# Patient Record
Sex: FEMALE | Race: WHITE | Employment: OTHER | URBAN - METROPOLITAN AREA
[De-identification: names, ages, dates, MRNs, and addresses within clinical notes are randomized per-mention and may not be internally consistent; named-entity substitution may affect disease eponyms.]

---

## 2018-04-18 ENCOUNTER — HOSPITAL ENCOUNTER (OUTPATIENT)
Age: 83
Discharge: HOME | End: 2018-04-20
Payer: MEDICARE

## 2018-04-18 LAB
ALBUMIN SERPL-MCNC: 4.1 G/DL (ref 3.5–5.2)
ALP BLD-CCNC: 55 U/L (ref 35–104)
ALT SERPL-CCNC: 13 U/L (ref 0–32)
ANION GAP SERPL CALCULATED.3IONS-SCNC: 21 MMOL/L (ref 7–16)
AST SERPL-CCNC: 22 U/L (ref 0–31)
BASOPHILS ABSOLUTE: 0.06 E9/L (ref 0–0.2)
BASOPHILS RELATIVE PERCENT: 1.3 % (ref 0–2)
BILIRUB SERPL-MCNC: 0.3 MG/DL (ref 0–1.2)
BUN BLDV-MCNC: 36 MG/DL (ref 8–23)
CALCIUM SERPL-MCNC: 9.5 MG/DL (ref 8.6–10.2)
CHLORIDE BLD-SCNC: 101 MMOL/L (ref 98–107)
CHOLESTEROL, TOTAL: 152 MG/DL (ref 0–199)
CO2: 22 MMOL/L (ref 22–29)
CREAT SERPL-MCNC: 1.2 MG/DL (ref 0.5–1)
EOSINOPHILS ABSOLUTE: 0.06 E9/L (ref 0.05–0.5)
EOSINOPHILS RELATIVE PERCENT: 1.3 % (ref 0–6)
GFR AFRICAN AMERICAN: 51
GFR NON-AFRICAN AMERICAN: 42 ML/MIN/1.73
GLUCOSE BLD-MCNC: 97 MG/DL (ref 74–109)
HCT VFR BLD CALC: 37 % (ref 34–48)
HDLC SERPL-MCNC: 75 MG/DL
HEMOGLOBIN: 11.8 G/DL (ref 11.5–15.5)
IMMATURE GRANULOCYTES #: 0.02 E9/L
IMMATURE GRANULOCYTES %: 0.4 % (ref 0–5)
LDL CHOLESTEROL CALCULATED: 41 MG/DL (ref 0–99)
LYMPHOCYTES ABSOLUTE: 0.94 E9/L (ref 1.5–4)
LYMPHOCYTES RELATIVE PERCENT: 19.7 % (ref 20–42)
MCH RBC QN AUTO: 30.8 PG (ref 26–35)
MCHC RBC AUTO-ENTMCNC: 31.9 % (ref 32–34.5)
MCV RBC AUTO: 96.6 FL (ref 80–99.9)
MONOCYTES ABSOLUTE: 0.48 E9/L (ref 0.1–0.95)
MONOCYTES RELATIVE PERCENT: 10.1 % (ref 2–12)
NEUTROPHILS ABSOLUTE: 3.2 E9/L (ref 1.8–7.3)
NEUTROPHILS RELATIVE PERCENT: 67.2 % (ref 43–80)
PDW BLD-RTO: 13.6 FL (ref 11.5–15)
PLATELET # BLD: 154 E9/L (ref 130–450)
PMV BLD AUTO: 12.1 FL (ref 7–12)
POTASSIUM SERPL-SCNC: 4.6 MMOL/L (ref 3.5–5)
RBC # BLD: 3.83 E12/L (ref 3.5–5.5)
SODIUM BLD-SCNC: 144 MMOL/L (ref 132–146)
TOTAL PROTEIN: 7.1 G/DL (ref 6.4–8.3)
TRIGL SERPL-MCNC: 181 MG/DL (ref 0–149)
TSH SERPL DL<=0.05 MIU/L-ACNC: 3.34 UIU/ML (ref 0.27–4.2)
VITAMIN D 25-HYDROXY: 40 NG/ML (ref 30–100)
VLDLC SERPL CALC-MCNC: 36 MG/DL
WBC # BLD: 4.8 E9/L (ref 4.5–11.5)

## 2018-04-18 PROCEDURE — 82306 VITAMIN D 25 HYDROXY: CPT

## 2018-04-18 PROCEDURE — 84443 ASSAY THYROID STIM HORMONE: CPT

## 2018-04-18 PROCEDURE — 85025 COMPLETE CBC W/AUTO DIFF WBC: CPT

## 2018-04-18 PROCEDURE — 80053 COMPREHEN METABOLIC PANEL: CPT

## 2018-04-18 PROCEDURE — 80061 LIPID PANEL: CPT

## 2018-04-24 ENCOUNTER — HOSPITAL ENCOUNTER (OUTPATIENT)
Dept: ULTRASOUND IMAGING | Age: 83
End: 2018-04-24
Payer: MEDICARE

## 2018-04-24 ENCOUNTER — HOSPITAL ENCOUNTER (OUTPATIENT)
Dept: MAMMOGRAPHY | Age: 83
Discharge: HOME | End: 2018-04-26
Payer: MEDICARE

## 2018-04-24 DIAGNOSIS — Z12.39 BREAST SCREENING: ICD-10-CM

## 2018-04-24 PROCEDURE — G0279 TOMOSYNTHESIS, MAMMO: HCPCS

## 2018-05-08 ENCOUNTER — HOSPITAL ENCOUNTER (OUTPATIENT)
Dept: INFUSION THERAPY | Age: 83
Discharge: HOME | End: 2018-05-08
Payer: MEDICARE

## 2018-05-08 ENCOUNTER — OFFICE VISIT (OUTPATIENT)
Dept: ONCOLOGY | Age: 83
End: 2018-05-08
Payer: MEDICARE

## 2018-05-08 VITALS
HEART RATE: 73 BPM | BODY MASS INDEX: 27.83 KG/M2 | DIASTOLIC BLOOD PRESSURE: 85 MMHG | HEIGHT: 64 IN | WEIGHT: 163 LBS | TEMPERATURE: 98.6 F | SYSTOLIC BLOOD PRESSURE: 165 MMHG | RESPIRATION RATE: 20 BRPM

## 2018-05-08 DIAGNOSIS — C50.512 MALIGNANT NEOPLASM OF LOWER-OUTER QUADRANT OF LEFT FEMALE BREAST, UNSPECIFIED ESTROGEN RECEPTOR STATUS (HCC): ICD-10-CM

## 2018-05-08 LAB
ALBUMIN SERPL-MCNC: 3.9 G/DL (ref 3.5–5.2)
ALP BLD-CCNC: 58 U/L (ref 35–104)
ALT SERPL-CCNC: 13 U/L (ref 0–32)
ANION GAP SERPL CALCULATED.3IONS-SCNC: 14 MMOL/L (ref 7–16)
AST SERPL-CCNC: 21 U/L (ref 0–31)
BASOPHILS ABSOLUTE: 0.07 E9/L (ref 0–0.2)
BASOPHILS RELATIVE PERCENT: 1.5 % (ref 0–2)
BILIRUB SERPL-MCNC: 0.3 MG/DL (ref 0–1.2)
BUN BLDV-MCNC: 35 MG/DL (ref 8–23)
CALCIUM SERPL-MCNC: 9.2 MG/DL (ref 8.6–10.2)
CHLORIDE BLD-SCNC: 102 MMOL/L (ref 98–107)
CO2: 24 MMOL/L (ref 22–29)
CREAT SERPL-MCNC: 1.2 MG/DL (ref 0.5–1)
EOSINOPHILS ABSOLUTE: 0.06 E9/L (ref 0.05–0.5)
EOSINOPHILS RELATIVE PERCENT: 1.3 % (ref 0–6)
GFR AFRICAN AMERICAN: 51
GFR NON-AFRICAN AMERICAN: 42 ML/MIN/1.73
GLUCOSE BLD-MCNC: 133 MG/DL (ref 74–109)
HCT VFR BLD CALC: 35.7 % (ref 34–48)
HEMOGLOBIN: 11.7 G/DL (ref 11.5–15.5)
IMMATURE GRANULOCYTES #: 0.03 E9/L
IMMATURE GRANULOCYTES %: 0.6 % (ref 0–5)
LYMPHOCYTES ABSOLUTE: 1 E9/L (ref 1.5–4)
LYMPHOCYTES RELATIVE PERCENT: 21 % (ref 20–42)
MCH RBC QN AUTO: 31.6 PG (ref 26–35)
MCHC RBC AUTO-ENTMCNC: 32.8 % (ref 32–34.5)
MCV RBC AUTO: 96.5 FL (ref 80–99.9)
MONOCYTES ABSOLUTE: 0.49 E9/L (ref 0.1–0.95)
MONOCYTES RELATIVE PERCENT: 10.3 % (ref 2–12)
NEUTROPHILS ABSOLUTE: 3.12 E9/L (ref 1.8–7.3)
NEUTROPHILS RELATIVE PERCENT: 65.3 % (ref 43–80)
PDW BLD-RTO: 13.4 FL (ref 11.5–15)
PLATELET # BLD: 150 E9/L (ref 130–450)
PMV BLD AUTO: 11.1 FL (ref 7–12)
POTASSIUM SERPL-SCNC: 4.4 MMOL/L (ref 3.5–5)
RBC # BLD: 3.7 E12/L (ref 3.5–5.5)
SODIUM BLD-SCNC: 140 MMOL/L (ref 132–146)
TOTAL PROTEIN: 6.6 G/DL (ref 6.4–8.3)
WBC # BLD: 4.8 E9/L (ref 4.5–11.5)

## 2018-05-08 PROCEDURE — 80053 COMPREHEN METABOLIC PANEL: CPT

## 2018-05-08 PROCEDURE — 99214 OFFICE O/P EST MOD 30 MIN: CPT | Performed by: INTERNAL MEDICINE

## 2018-05-08 PROCEDURE — 85025 COMPLETE CBC W/AUTO DIFF WBC: CPT

## 2018-05-08 PROCEDURE — 36415 COLL VENOUS BLD VENIPUNCTURE: CPT | Performed by: INTERNAL MEDICINE

## 2018-05-08 PROCEDURE — 1090F PRES/ABSN URINE INCON ASSESS: CPT | Performed by: INTERNAL MEDICINE

## 2018-05-08 PROCEDURE — 1036F TOBACCO NON-USER: CPT | Performed by: INTERNAL MEDICINE

## 2018-05-08 PROCEDURE — 1123F ACP DISCUSS/DSCN MKR DOCD: CPT | Performed by: INTERNAL MEDICINE

## 2018-05-08 PROCEDURE — 99212 OFFICE O/P EST SF 10 MIN: CPT

## 2018-05-08 PROCEDURE — 4040F PNEUMOC VAC/ADMIN/RCVD: CPT | Performed by: INTERNAL MEDICINE

## 2018-05-08 PROCEDURE — G8417 CALC BMI ABV UP PARAM F/U: HCPCS | Performed by: INTERNAL MEDICINE

## 2018-05-08 PROCEDURE — G8427 DOCREV CUR MEDS BY ELIG CLIN: HCPCS | Performed by: INTERNAL MEDICINE

## 2018-08-21 ENCOUNTER — HOSPITAL ENCOUNTER (OUTPATIENT)
Age: 83
Discharge: HOME | End: 2018-08-23
Payer: MEDICARE

## 2018-08-21 LAB
ALBUMIN SERPL-MCNC: 4.2 G/DL (ref 3.5–5.2)
ALP BLD-CCNC: 53 U/L (ref 35–104)
ALT SERPL-CCNC: 12 U/L (ref 0–32)
ANION GAP SERPL CALCULATED.3IONS-SCNC: 21 MMOL/L (ref 7–16)
AST SERPL-CCNC: 22 U/L (ref 0–31)
BASOPHILS ABSOLUTE: 0.05 E9/L (ref 0–0.2)
BASOPHILS RELATIVE PERCENT: 0.9 % (ref 0–2)
BILIRUB SERPL-MCNC: 0.2 MG/DL (ref 0–1.2)
BUN BLDV-MCNC: 33 MG/DL (ref 8–23)
CALCIUM SERPL-MCNC: 9.4 MG/DL (ref 8.6–10.2)
CHLORIDE BLD-SCNC: 101 MMOL/L (ref 98–107)
CHOLESTEROL, TOTAL: 167 MG/DL (ref 0–199)
CO2: 20 MMOL/L (ref 22–29)
CREAT SERPL-MCNC: 1.4 MG/DL (ref 0.5–1)
EOSINOPHILS ABSOLUTE: 0.11 E9/L (ref 0.05–0.5)
EOSINOPHILS RELATIVE PERCENT: 2 % (ref 0–6)
GFR AFRICAN AMERICAN: 43
GFR NON-AFRICAN AMERICAN: 35 ML/MIN/1.73
GLUCOSE BLD-MCNC: 75 MG/DL (ref 74–109)
HCT VFR BLD CALC: 35.7 % (ref 34–48)
HDLC SERPL-MCNC: 69 MG/DL
HEMOGLOBIN: 11.5 G/DL (ref 11.5–15.5)
IMMATURE GRANULOCYTES #: 0.02 E9/L
IMMATURE GRANULOCYTES %: 0.4 % (ref 0–5)
LDL CHOLESTEROL CALCULATED: 53 MG/DL (ref 0–99)
LYMPHOCYTES ABSOLUTE: 1.16 E9/L (ref 1.5–4)
LYMPHOCYTES RELATIVE PERCENT: 20.6 % (ref 20–42)
MCH RBC QN AUTO: 31.2 PG (ref 26–35)
MCHC RBC AUTO-ENTMCNC: 32.2 % (ref 32–34.5)
MCV RBC AUTO: 96.7 FL (ref 80–99.9)
MONOCYTES ABSOLUTE: 0.56 E9/L (ref 0.1–0.95)
MONOCYTES RELATIVE PERCENT: 10 % (ref 2–12)
NEUTROPHILS ABSOLUTE: 3.72 E9/L (ref 1.8–7.3)
NEUTROPHILS RELATIVE PERCENT: 66.1 % (ref 43–80)
PDW BLD-RTO: 13.5 FL (ref 11.5–15)
PLATELET # BLD: 156 E9/L (ref 130–450)
PMV BLD AUTO: 11.8 FL (ref 7–12)
POTASSIUM SERPL-SCNC: 4.6 MMOL/L (ref 3.5–5)
RBC # BLD: 3.69 E12/L (ref 3.5–5.5)
SODIUM BLD-SCNC: 142 MMOL/L (ref 132–146)
TOTAL PROTEIN: 6.8 G/DL (ref 6.4–8.3)
TRIGL SERPL-MCNC: 225 MG/DL (ref 0–149)
TSH SERPL DL<=0.05 MIU/L-ACNC: 3.06 UIU/ML (ref 0.27–4.2)
VLDLC SERPL CALC-MCNC: 45 MG/DL
WBC # BLD: 5.6 E9/L (ref 4.5–11.5)

## 2018-08-21 PROCEDURE — 80061 LIPID PANEL: CPT

## 2018-08-21 PROCEDURE — 80053 COMPREHEN METABOLIC PANEL: CPT

## 2018-08-21 PROCEDURE — 84443 ASSAY THYROID STIM HORMONE: CPT

## 2018-08-21 PROCEDURE — 85025 COMPLETE CBC W/AUTO DIFF WBC: CPT

## 2018-11-06 ENCOUNTER — OFFICE VISIT (OUTPATIENT)
Dept: ONCOLOGY | Age: 83
End: 2018-11-06
Payer: MEDICARE

## 2018-11-06 ENCOUNTER — HOSPITAL ENCOUNTER (OUTPATIENT)
Dept: INFUSION THERAPY | Age: 83
Discharge: HOME | End: 2018-11-06
Payer: MEDICARE

## 2018-11-06 VITALS
WEIGHT: 162.4 LBS | BODY MASS INDEX: 27.72 KG/M2 | SYSTOLIC BLOOD PRESSURE: 196 MMHG | HEART RATE: 76 BPM | DIASTOLIC BLOOD PRESSURE: 93 MMHG | TEMPERATURE: 98.4 F | RESPIRATION RATE: 20 BRPM | HEIGHT: 64 IN

## 2018-11-06 DIAGNOSIS — C50.512 MALIGNANT NEOPLASM OF LOWER-OUTER QUADRANT OF LEFT BREAST OF FEMALE, ESTROGEN RECEPTOR POSITIVE (HCC): Primary | ICD-10-CM

## 2018-11-06 DIAGNOSIS — Z17.0 MALIGNANT NEOPLASM OF LOWER-OUTER QUADRANT OF LEFT BREAST OF FEMALE, ESTROGEN RECEPTOR POSITIVE (HCC): Primary | ICD-10-CM

## 2018-11-06 LAB
ALBUMIN SERPL-MCNC: 4.1 G/DL (ref 3.5–5.2)
ALP BLD-CCNC: 57 U/L (ref 35–104)
ALT SERPL-CCNC: 14 U/L (ref 0–32)
ANION GAP SERPL CALCULATED.3IONS-SCNC: 12 MMOL/L (ref 7–16)
AST SERPL-CCNC: 24 U/L (ref 0–31)
BASOPHILS ABSOLUTE: 0.06 E9/L (ref 0–0.2)
BASOPHILS RELATIVE PERCENT: 1 % (ref 0–2)
BILIRUB SERPL-MCNC: <0.2 MG/DL (ref 0–1.2)
BUN BLDV-MCNC: 30 MG/DL (ref 8–23)
CALCIUM SERPL-MCNC: 9.3 MG/DL (ref 8.6–10.2)
CHLORIDE BLD-SCNC: 102 MMOL/L (ref 98–107)
CO2: 26 MMOL/L (ref 22–29)
CREAT SERPL-MCNC: 1.3 MG/DL (ref 0.5–1)
EOSINOPHILS ABSOLUTE: 0.07 E9/L (ref 0.05–0.5)
EOSINOPHILS RELATIVE PERCENT: 1.2 % (ref 0–6)
GFR AFRICAN AMERICAN: 47
GFR NON-AFRICAN AMERICAN: 38 ML/MIN/1.73
GLUCOSE BLD-MCNC: 107 MG/DL (ref 74–99)
HCT VFR BLD CALC: 36.2 % (ref 34–48)
HEMOGLOBIN: 11.6 G/DL (ref 11.5–15.5)
IMMATURE GRANULOCYTES #: 0.02 E9/L
IMMATURE GRANULOCYTES %: 0.3 % (ref 0–5)
LYMPHOCYTES ABSOLUTE: 1.01 E9/L (ref 1.5–4)
LYMPHOCYTES RELATIVE PERCENT: 17.1 % (ref 20–42)
MCH RBC QN AUTO: 31.4 PG (ref 26–35)
MCHC RBC AUTO-ENTMCNC: 32 % (ref 32–34.5)
MCV RBC AUTO: 97.8 FL (ref 80–99.9)
MONOCYTES ABSOLUTE: 0.56 E9/L (ref 0.1–0.95)
MONOCYTES RELATIVE PERCENT: 9.5 % (ref 2–12)
NEUTROPHILS ABSOLUTE: 4.19 E9/L (ref 1.8–7.3)
NEUTROPHILS RELATIVE PERCENT: 70.9 % (ref 43–80)
PDW BLD-RTO: 13.2 FL (ref 11.5–15)
PLATELET # BLD: 165 E9/L (ref 130–450)
PMV BLD AUTO: 11.1 FL (ref 7–12)
POTASSIUM SERPL-SCNC: 4.7 MMOL/L (ref 3.5–5)
RBC # BLD: 3.7 E12/L (ref 3.5–5.5)
SODIUM BLD-SCNC: 140 MMOL/L (ref 132–146)
TOTAL PROTEIN: 6.9 G/DL (ref 6.4–8.3)
WBC # BLD: 5.9 E9/L (ref 4.5–11.5)

## 2018-11-06 PROCEDURE — 1090F PRES/ABSN URINE INCON ASSESS: CPT | Performed by: INTERNAL MEDICINE

## 2018-11-06 PROCEDURE — 99214 OFFICE O/P EST MOD 30 MIN: CPT | Performed by: INTERNAL MEDICINE

## 2018-11-06 PROCEDURE — 99213 OFFICE O/P EST LOW 20 MIN: CPT

## 2018-11-06 PROCEDURE — 1101F PT FALLS ASSESS-DOCD LE1/YR: CPT | Performed by: INTERNAL MEDICINE

## 2018-11-06 PROCEDURE — 1123F ACP DISCUSS/DSCN MKR DOCD: CPT | Performed by: INTERNAL MEDICINE

## 2018-11-06 PROCEDURE — G8427 DOCREV CUR MEDS BY ELIG CLIN: HCPCS | Performed by: INTERNAL MEDICINE

## 2018-11-06 PROCEDURE — 80053 COMPREHEN METABOLIC PANEL: CPT

## 2018-11-06 PROCEDURE — 85025 COMPLETE CBC W/AUTO DIFF WBC: CPT

## 2018-11-06 PROCEDURE — 36415 COLL VENOUS BLD VENIPUNCTURE: CPT

## 2018-11-06 PROCEDURE — 4040F PNEUMOC VAC/ADMIN/RCVD: CPT | Performed by: INTERNAL MEDICINE

## 2018-11-06 PROCEDURE — 99214 OFFICE O/P EST MOD 30 MIN: CPT

## 2018-11-06 PROCEDURE — G8417 CALC BMI ABV UP PARAM F/U: HCPCS | Performed by: INTERNAL MEDICINE

## 2018-11-06 PROCEDURE — 1036F TOBACCO NON-USER: CPT | Performed by: INTERNAL MEDICINE

## 2018-11-06 PROCEDURE — G8484 FLU IMMUNIZE NO ADMIN: HCPCS | Performed by: INTERNAL MEDICINE

## 2018-11-06 NOTE — PROGRESS NOTES
osteoporosis. She underwent a left needle localized lumpectomy on Jessi 3, 2015. Pathological evaluation demonstrated:  Breast, left at 6 o'clock, excision: Ductal carcinoma, invasive, moderately differentiated (Grade 2). Margins of resection are free of tumor. Tumor lies 5 mm from the closest (anterior) margin of resection. Tumor size: Invasive carcinoma measures 6 mm in greatest dimension  Pathologic staging (pTNM): pT1b, pNX  Stage I. Osteoporosis precluded AI therapy; Patient was a candidate for Tamoxifen. Tamoxifen 20 mg po daily was started on 06/23/2015. Review of Systems;  CONSTITUTIONAL: No fever, chills. Denies hot flashes. Fair appetite and energy level. ENMT: Eyes: No diplopia; Nose: No epistaxis. Mouth: No sore throat. RESPIRATORY: No hemoptysis, shortness of breath, cough. CARDIOVASCULAR: No chest pain, palpitations. GASTROINTESTINAL: No nausea/vomiting, abdominal pain, diarrhea/constipation. GENITOURINARY: No dysuria, urinary frequency, hematuria. MSK: Mild arthralgias, stable. Uses walker for ambulation. NEURO: No syncope, presyncope, headache. Remainder ROS: Negative    Past Medical History:      Diagnosis Date    Cancer ([facility] [facility]     melonoma - back    CHF (congestive heart failure) (HCC)     Chronic back pain     Hyperlipidemia     Hypertension     Macular degeneration     Osteoarthritis     Restless legs syndrome      Medications:  Reviewed and reconciled. Allergies: Allergies   Allergen Reactions    Flagyl [Metronidazole] Hives     Physical Exam:  BP (!) 196/93 (Site: Right Upper Arm, Position: Sitting, Cuff Size: Medium Adult) Comment: pumped twice  Pulse 76   Temp 98.4 °F (36.9 °C) (Temporal)   Resp 20   Ht 5' 4\" (1.626 m)   Wt 162 lb 6.4 oz (73.7 kg)   BMI 27.88 kg/m²   GENERAL: Alert, oriented x 3, not in acute distress. HEENT: PERRLA; EOMI. Oropharynx clear. NECK: Supple. Without lymphadenopathy. LUNGS: Good air entry bilaterally.  No wheezing,

## 2019-04-01 ENCOUNTER — TELEPHONE (OUTPATIENT)
Dept: ONCOLOGY | Age: 84
End: 2019-04-01

## 2019-04-01 NOTE — TELEPHONE ENCOUNTER
Spoke with Clark Santos this morning to give her appt info for scheduled [facility] at [address] [place] [address] on Monday 4/29/19 @ 10:30. Arrive 10am.  No lotions, creams, powders around breast area or under arms. Go to entrance B. Clark Santos confirmed info.

## 2019-04-16 ENCOUNTER — HOSPITAL ENCOUNTER (OUTPATIENT)
Age: 84
Discharge: HOME | End: 2019-04-18
Payer: MEDICARE

## 2019-04-16 LAB
ALBUMIN SERPL-MCNC: 4.2 G/DL (ref 3.5–5.2)
ALP BLD-CCNC: 61 U/L (ref 35–104)
ALT SERPL-CCNC: 13 U/L (ref 0–32)
ANION GAP SERPL CALCULATED.3IONS-SCNC: 19 MMOL/L (ref 7–16)
AST SERPL-CCNC: 23 U/L (ref 0–31)
BASOPHILS ABSOLUTE: 0.05 E9/L (ref 0–0.2)
BASOPHILS RELATIVE PERCENT: 0.9 % (ref 0–2)
BILIRUB SERPL-MCNC: 0.2 MG/DL (ref 0–1.2)
BUN BLDV-MCNC: 33 MG/DL (ref 8–23)
CALCIUM SERPL-MCNC: 9.4 MG/DL (ref 8.6–10.2)
CHLORIDE BLD-SCNC: 106 MMOL/L (ref 98–107)
CHOLESTEROL, TOTAL: 150 MG/DL (ref 0–199)
CO2: 19 MMOL/L (ref 22–29)
CREAT SERPL-MCNC: 1.4 MG/DL (ref 0.5–1)
EOSINOPHILS ABSOLUTE: 0.06 E9/L (ref 0.05–0.5)
EOSINOPHILS RELATIVE PERCENT: 1.1 % (ref 0–6)
GFR AFRICAN AMERICAN: 43
GFR NON-AFRICAN AMERICAN: 35 ML/MIN/1.73
GLUCOSE BLD-MCNC: 92 MG/DL (ref 74–99)
HCT VFR BLD CALC: 35.7 % (ref 34–48)
HDLC SERPL-MCNC: 79 MG/DL
HEMOGLOBIN: 11.5 G/DL (ref 11.5–15.5)
IMMATURE GRANULOCYTES #: 0.02 E9/L
IMMATURE GRANULOCYTES %: 0.4 % (ref 0–5)
LDL CHOLESTEROL CALCULATED: 37 MG/DL (ref 0–99)
LYMPHOCYTES ABSOLUTE: 0.85 E9/L (ref 1.5–4)
LYMPHOCYTES RELATIVE PERCENT: 15.4 % (ref 20–42)
MCH RBC QN AUTO: 31.3 PG (ref 26–35)
MCHC RBC AUTO-ENTMCNC: 32.2 % (ref 32–34.5)
MCV RBC AUTO: 97.3 FL (ref 80–99.9)
MONOCYTES ABSOLUTE: 0.63 E9/L (ref 0.1–0.95)
MONOCYTES RELATIVE PERCENT: 11.4 % (ref 2–12)
NEUTROPHILS ABSOLUTE: 3.91 E9/L (ref 1.8–7.3)
NEUTROPHILS RELATIVE PERCENT: 70.8 % (ref 43–80)
PDW BLD-RTO: 14.2 FL (ref 11.5–15)
PLATELET # BLD: 158 E9/L (ref 130–450)
PMV BLD AUTO: 11.7 FL (ref 7–12)
POTASSIUM SERPL-SCNC: 4.6 MMOL/L (ref 3.5–5)
RBC # BLD: 3.67 E12/L (ref 3.5–5.5)
SODIUM BLD-SCNC: 144 MMOL/L (ref 132–146)
TOTAL PROTEIN: 6.9 G/DL (ref 6.4–8.3)
TRIGL SERPL-MCNC: 168 MG/DL (ref 0–149)
TSH SERPL DL<=0.05 MIU/L-ACNC: 3.13 UIU/ML (ref 0.27–4.2)
VLDLC SERPL CALC-MCNC: 34 MG/DL
WBC # BLD: 5.5 E9/L (ref 4.5–11.5)

## 2019-04-16 PROCEDURE — 80061 LIPID PANEL: CPT

## 2019-04-16 PROCEDURE — 80053 COMPREHEN METABOLIC PANEL: CPT

## 2019-04-16 PROCEDURE — 85025 COMPLETE CBC W/AUTO DIFF WBC: CPT

## 2019-04-16 PROCEDURE — 84443 ASSAY THYROID STIM HORMONE: CPT

## 2019-05-22 ENCOUNTER — HOSPITAL ENCOUNTER (OUTPATIENT)
Dept: ULTRASOUND IMAGING | Age: 84
Discharge: HOME | End: 2019-05-22
Payer: MEDICARE

## 2019-05-22 ENCOUNTER — HOSPITAL ENCOUNTER (OUTPATIENT)
Dept: MAMMOGRAPHY | Age: 84
Discharge: HOME | End: 2019-05-24
Payer: MEDICARE

## 2019-05-22 DIAGNOSIS — N64.89 BREAST ASYMMETRY: ICD-10-CM

## 2019-05-22 DIAGNOSIS — Z17.0 MALIGNANT NEOPLASM OF LOWER-OUTER QUADRANT OF LEFT BREAST OF FEMALE, ESTROGEN RECEPTOR POSITIVE (HCC): ICD-10-CM

## 2019-05-22 DIAGNOSIS — C50.512 MALIGNANT NEOPLASM OF LOWER-OUTER QUADRANT OF LEFT BREAST OF FEMALE, ESTROGEN RECEPTOR POSITIVE (HCC): ICD-10-CM

## 2019-05-22 PROCEDURE — 76642 ULTRASOUND BREAST LIMITED: CPT

## 2019-05-22 PROCEDURE — 77066 DX MAMMO INCL CAD BI: CPT

## 2019-05-28 ENCOUNTER — OFFICE VISIT (OUTPATIENT)
Dept: ONCOLOGY | Age: 84
End: 2019-05-28
Payer: MEDICARE

## 2019-05-28 ENCOUNTER — HOSPITAL ENCOUNTER (OUTPATIENT)
Dept: INFUSION THERAPY | Age: 84
Discharge: HOME | End: 2019-05-28
Payer: MEDICARE

## 2019-05-28 VITALS
SYSTOLIC BLOOD PRESSURE: 191 MMHG | BODY MASS INDEX: 26.77 KG/M2 | DIASTOLIC BLOOD PRESSURE: 84 MMHG | RESPIRATION RATE: 20 BRPM | TEMPERATURE: 97.9 F | WEIGHT: 156.8 LBS | HEIGHT: 64 IN | HEART RATE: 84 BPM

## 2019-05-28 DIAGNOSIS — Z85.3 PERSONAL HISTORY OF BREAST CANCER: ICD-10-CM

## 2019-05-28 DIAGNOSIS — Z85.3 PERSONAL HISTORY OF BREAST CANCER: Primary | ICD-10-CM

## 2019-05-28 DIAGNOSIS — Z17.0 MALIGNANT NEOPLASM OF LOWER-OUTER QUADRANT OF LEFT BREAST OF FEMALE, ESTROGEN RECEPTOR POSITIVE (HCC): Primary | ICD-10-CM

## 2019-05-28 DIAGNOSIS — C50.512 MALIGNANT NEOPLASM OF LOWER-OUTER QUADRANT OF LEFT BREAST OF FEMALE, ESTROGEN RECEPTOR POSITIVE (HCC): Primary | ICD-10-CM

## 2019-05-28 LAB
ALBUMIN SERPL-MCNC: 3.8 G/DL (ref 3.5–5.2)
ALP BLD-CCNC: 60 U/L (ref 35–104)
ALT SERPL-CCNC: 12 U/L (ref 0–32)
ANION GAP SERPL CALCULATED.3IONS-SCNC: 10 MMOL/L (ref 7–16)
AST SERPL-CCNC: 20 U/L (ref 0–31)
BASOPHILS ABSOLUTE: 0.06 E9/L (ref 0–0.2)
BASOPHILS RELATIVE PERCENT: 1.3 % (ref 0–2)
BILIRUB SERPL-MCNC: 0.3 MG/DL (ref 0–1.2)
BUN BLDV-MCNC: 34 MG/DL (ref 8–23)
CALCIUM SERPL-MCNC: 9.3 MG/DL (ref 8.6–10.2)
CHLORIDE BLD-SCNC: 101 MMOL/L (ref 98–107)
CO2: 26 MMOL/L (ref 22–29)
CREAT SERPL-MCNC: 1.3 MG/DL (ref 0.5–1)
EOSINOPHILS ABSOLUTE: 0.06 E9/L (ref 0.05–0.5)
EOSINOPHILS RELATIVE PERCENT: 1.3 % (ref 0–6)
GFR AFRICAN AMERICAN: 46
GFR NON-AFRICAN AMERICAN: 38 ML/MIN/1.73
GLUCOSE BLD-MCNC: 121 MG/DL (ref 74–99)
HCT VFR BLD CALC: 34.9 % (ref 34–48)
HEMOGLOBIN: 11.3 G/DL (ref 11.5–15.5)
IMMATURE GRANULOCYTES #: 0.02 E9/L
IMMATURE GRANULOCYTES %: 0.4 % (ref 0–5)
LYMPHOCYTES ABSOLUTE: 0.76 E9/L (ref 1.5–4)
LYMPHOCYTES RELATIVE PERCENT: 16.3 % (ref 20–42)
MCH RBC QN AUTO: 31.8 PG (ref 26–35)
MCHC RBC AUTO-ENTMCNC: 32.4 % (ref 32–34.5)
MCV RBC AUTO: 98.3 FL (ref 80–99.9)
MONOCYTES ABSOLUTE: 0.53 E9/L (ref 0.1–0.95)
MONOCYTES RELATIVE PERCENT: 11.4 % (ref 2–12)
NEUTROPHILS ABSOLUTE: 3.23 E9/L (ref 1.8–7.3)
NEUTROPHILS RELATIVE PERCENT: 69.3 % (ref 43–80)
PDW BLD-RTO: 13.5 FL (ref 11.5–15)
PLATELET # BLD: 148 E9/L (ref 130–450)
PMV BLD AUTO: 10.6 FL (ref 7–12)
POTASSIUM SERPL-SCNC: 4.4 MMOL/L (ref 3.5–5)
RBC # BLD: 3.55 E12/L (ref 3.5–5.5)
SODIUM BLD-SCNC: 137 MMOL/L (ref 132–146)
TOTAL PROTEIN: 6.4 G/DL (ref 6.4–8.3)
WBC # BLD: 4.7 E9/L (ref 4.5–11.5)

## 2019-05-28 PROCEDURE — G8417 CALC BMI ABV UP PARAM F/U: HCPCS | Performed by: INTERNAL MEDICINE

## 2019-05-28 PROCEDURE — 99213 OFFICE O/P EST LOW 20 MIN: CPT

## 2019-05-28 PROCEDURE — 85025 COMPLETE CBC W/AUTO DIFF WBC: CPT

## 2019-05-28 PROCEDURE — 1090F PRES/ABSN URINE INCON ASSESS: CPT | Performed by: INTERNAL MEDICINE

## 2019-05-28 PROCEDURE — 1123F ACP DISCUSS/DSCN MKR DOCD: CPT | Performed by: INTERNAL MEDICINE

## 2019-05-28 PROCEDURE — 1036F TOBACCO NON-USER: CPT | Performed by: INTERNAL MEDICINE

## 2019-05-28 PROCEDURE — 36415 COLL VENOUS BLD VENIPUNCTURE: CPT

## 2019-05-28 PROCEDURE — G8427 DOCREV CUR MEDS BY ELIG CLIN: HCPCS | Performed by: INTERNAL MEDICINE

## 2019-05-28 PROCEDURE — 4040F PNEUMOC VAC/ADMIN/RCVD: CPT | Performed by: INTERNAL MEDICINE

## 2019-05-28 PROCEDURE — 99214 OFFICE O/P EST MOD 30 MIN: CPT | Performed by: INTERNAL MEDICINE

## 2019-05-28 PROCEDURE — 80053 COMPREHEN METABOLIC PANEL: CPT

## 2019-05-28 NOTE — PROGRESS NOTES
Department of [place] [id number]   Attending Clinic Note    Reason for Visit: Follow-up on a patient with Left Breast Cancer. PCP:  Carlos Liu MD    History of Present Illness: The breast lump was noted on the left diagnostic mammogram in March 2015 at [facility]. The mass was located in the 7 o'clock position of the left breast.     Left digital diagnostic mammogram was performed at [facility] on Febrauary 26, 2015     Left breast ultrasound performed at [facility] on Feb. 26th, 2015    The patient's imaging was performed at [facility] on March 19, 2015:  MAMMOGRAM FINDINGS:    There is an isodense area of architectural distortion measuring 9 mm with spiculated margins seen in the posterior region of the left breast lower inner quadrant. ULTRASOUND FINDINGS:    Ultrasound is suggestive of a lymph node or fat containing lesion measuring 4 mm at the site of the mammographic abnormality. This is thought to be discordant with the imaging findings. IMPRESSION:    Focus of architectural distortion in the left breast is suspicious. Molecular breast imaging and a stereotactic biopsy is recommended. The mammographic finding is new since the 2013 comparison exam. The US findings are thought to be discordant with the mammographic findings. The patient has been scheduled for MBI and stereotactic biopsy. Please note that the US biopsy was cancelled as a result of the discordant imaging findings. Since the lesion was better seen on the mammogram, a stereotactic biopsy has been scheduled. BI-RADS Category 4: Suspicious Abnormality      Stereotactic left breast core biopsy at 7 o'clock position on April 2 , 2015. Pathological evaluation at [facility] demonstrated Infiltrating ductal carcinoma, moderately differentiated. Estrogen Receptor (>95%), Progesterone Receptor (20%), Her2/sarabjit Negative (1+). CXR on 04/27/2015 noted no metastatic disease.  DEXA scan on 04/27/2015 noted osteoporosis. She underwent a left needle localized lumpectomy on Jessi 3, 2015. Pathological evaluation demonstrated:  Breast, left at 6 o'clock, excision: Ductal carcinoma, invasive, moderately differentiated (Grade 2). Margins of resection are free of tumor. Tumor lies 5 mm from the closest (anterior) margin of resection. Tumor size: Invasive carcinoma measures 6 mm in greatest dimension  Pathologic staging (pTNM): pT1b, pNX  Stage I. Osteoporosis precluded AI therapy; Patient was a candidate for Tamoxifen. Tamoxifen 20 mg po daily was started on 06/23/2015. Review of Systems;  CONSTITUTIONAL: No fever, chills. Denies hot flashes. Fair appetite and energy level. ENMT: Eyes: No diplopia; Nose: No epistaxis. Mouth: No sore throat. RESPIRATORY: No hemoptysis, shortness of breath, cough. CARDIOVASCULAR: No chest pain, palpitations. GASTROINTESTINAL: No nausea/vomiting, abdominal pain, diarrhea/constipation. GENITOURINARY: No dysuria, urinary frequency, hematuria. MSK: Mild arthralgias, stable. Uses walker for ambulation. NEURO: No syncope, presyncope, headache. Remainder ROS: Negative    Past Medical History:      Diagnosis Date    Cancer ([facility] [address]     melonoma - back    CHF (congestive heart failure) (HCC)     Chronic back pain     Hyperlipidemia     Hypertension     Macular degeneration     Osteoarthritis     Restless legs syndrome      Medications:  Reviewed and reconciled. Allergies: Allergies   Allergen Reactions    Flagyl [Metronidazole] Hives     Physical Exam:  BP (!) 191/84 (Site: Right Upper Arm, Position: Sitting, Cuff Size: Medium Adult) Comment: Machine pumped twice. Pulse 84   Temp 97.9 °F (36.6 °C) (Oral)   Resp 20   Ht 5' 4\" (1.626 m)   Wt 156 lb 12.8 oz (71.1 kg)   BMI 26.91 kg/m²   GENERAL: Alert, oriented x 3, not in acute distress. HEENT: PERRLA; EOMI. Oropharynx clear. NECK: Supple. Without lymphadenopathy. LUNGS: Good air entry bilaterally.  No wheezing, crackles or rhonchi. BREASTS:No palpable masses or adenopathy. CARDIOVASCULAR: Regular rate. No murmurs, rubs or gallops. ABDOMEN: Soft. Non-tender, non-distended. Positive bowel sounds. EXTREMITIES: Without clubbing or cyanosis or edema   NEUROLOGIC: No focal deficits. ECOG PS 1    Impression/Plan:  80y.o. year old woman who underwent a left needle localized lumpectomy on Jessi 3, 2015. Pathological evaluation demonstrated:  Breast, left at 6 o'clock, excision: Ductal carcinoma, invasive, moderately differentiated (Grade 2). Margins of resection are free of tumor. Tumor lies 5 mm from the closest (anterior) margin of resection. Tumor size: Invasive carcinoma measures 6 mm in greatest dimension  Pathologic staging (pTNM): pT1b, pNX  Stage I. Osteoporosis on DEXA scan April 2015 precluded AI therapy; Patient was a candidate for Tamoxifen. Tamoxifen 20 mg po daily was started on 06/23/2015. On low-dose aspirin 81 mg po daily. No routine GYN exams due to total hysterectomy. Bilateral Diagnostic Mammogram/Left Breast U/S on 05/22/2019: There is a new small asymmetry medial to the scar in the central left breast on the cc view. This is not well seen on the MLO view. Ultrasound performed near the surgical scar documenting no evidence of a mass. Presents today, 05/28/2019 for follow up and continues to tolerate Tamoxifen well. No clinical evidence of recurrence. Continue Tamoxifen.  RTC 6 months with prior left diagnostic mammogram.    Antonette Acharya MD   8/31/2540  Board Certified Medical Oncologist   [facility] MEDICAL ONCOLOGY   [facility] [place]

## 2019-12-05 ENCOUNTER — HOSPITAL ENCOUNTER (OUTPATIENT)
Dept: MAMMOGRAPHY | Age: 84
Discharge: HOME | End: 2019-12-07
Payer: MEDICARE

## 2019-12-05 ENCOUNTER — HOSPITAL ENCOUNTER (OUTPATIENT)
Dept: ULTRASOUND IMAGING | Age: 84
Discharge: HOME | End: 2019-12-05
Payer: MEDICARE

## 2019-12-05 DIAGNOSIS — C50.512 MALIGNANT NEOPLASM OF LOWER-OUTER QUADRANT OF LEFT BREAST OF FEMALE, ESTROGEN RECEPTOR POSITIVE (HCC): ICD-10-CM

## 2019-12-05 DIAGNOSIS — Z17.0 MALIGNANT NEOPLASM OF LOWER-OUTER QUADRANT OF LEFT BREAST OF FEMALE, ESTROGEN RECEPTOR POSITIVE (HCC): ICD-10-CM

## 2019-12-05 PROCEDURE — 77065 DX MAMMO INCL CAD UNI: CPT

## 2019-12-06 ENCOUNTER — OFFICE VISIT (OUTPATIENT)
Dept: ONCOLOGY | Age: 84
End: 2019-12-06
Payer: MEDICARE

## 2019-12-06 ENCOUNTER — HOSPITAL ENCOUNTER (OUTPATIENT)
Dept: INFUSION THERAPY | Age: 84
Discharge: HOME | End: 2019-12-06
Payer: MEDICARE

## 2019-12-06 VITALS
OXYGEN SATURATION: 100 % | HEIGHT: 64 IN | BODY MASS INDEX: 26.8 KG/M2 | WEIGHT: 157 LBS | HEART RATE: 74 BPM | TEMPERATURE: 96.9 F | DIASTOLIC BLOOD PRESSURE: 72 MMHG | SYSTOLIC BLOOD PRESSURE: 150 MMHG

## 2019-12-06 DIAGNOSIS — Z17.0 MALIGNANT NEOPLASM OF LOWER-OUTER QUADRANT OF LEFT BREAST OF FEMALE, ESTROGEN RECEPTOR POSITIVE (HCC): Primary | ICD-10-CM

## 2019-12-06 DIAGNOSIS — C50.512 MALIGNANT NEOPLASM OF LOWER-OUTER QUADRANT OF LEFT BREAST OF FEMALE, ESTROGEN RECEPTOR POSITIVE (HCC): Primary | ICD-10-CM

## 2019-12-06 PROCEDURE — 1036F TOBACCO NON-USER: CPT | Performed by: INTERNAL MEDICINE

## 2019-12-06 PROCEDURE — G8427 DOCREV CUR MEDS BY ELIG CLIN: HCPCS | Performed by: INTERNAL MEDICINE

## 2019-12-06 PROCEDURE — 99213 OFFICE O/P EST LOW 20 MIN: CPT

## 2019-12-06 PROCEDURE — 99214 OFFICE O/P EST MOD 30 MIN: CPT | Performed by: INTERNAL MEDICINE

## 2019-12-06 PROCEDURE — G8484 FLU IMMUNIZE NO ADMIN: HCPCS | Performed by: INTERNAL MEDICINE

## 2019-12-06 PROCEDURE — G8417 CALC BMI ABV UP PARAM F/U: HCPCS | Performed by: INTERNAL MEDICINE

## 2019-12-06 PROCEDURE — 1123F ACP DISCUSS/DSCN MKR DOCD: CPT | Performed by: INTERNAL MEDICINE

## 2019-12-06 PROCEDURE — 4040F PNEUMOC VAC/ADMIN/RCVD: CPT | Performed by: INTERNAL MEDICINE

## 2019-12-06 PROCEDURE — 1090F PRES/ABSN URINE INCON ASSESS: CPT | Performed by: INTERNAL MEDICINE

## 2019-12-31 ENCOUNTER — HOSPITAL ENCOUNTER (OUTPATIENT)
Age: 84
Discharge: HOME | End: 2020-01-02
Payer: MEDICARE

## 2019-12-31 LAB
ALBUMIN SERPL-MCNC: 4.1 G/DL (ref 3.5–5.2)
ALP BLD-CCNC: 59 U/L (ref 35–104)
ALT SERPL-CCNC: 14 U/L (ref 0–32)
ANION GAP SERPL CALCULATED.3IONS-SCNC: 16 MMOL/L (ref 7–16)
AST SERPL-CCNC: 22 U/L (ref 0–31)
BASOPHILS ABSOLUTE: 0.06 E9/L (ref 0–0.2)
BASOPHILS RELATIVE PERCENT: 1.1 % (ref 0–2)
BILIRUB SERPL-MCNC: 0.3 MG/DL (ref 0–1.2)
BUN BLDV-MCNC: 33 MG/DL (ref 8–23)
CALCIUM SERPL-MCNC: 9.9 MG/DL (ref 8.6–10.2)
CHLORIDE BLD-SCNC: 101 MMOL/L (ref 98–107)
CHOLESTEROL, TOTAL: 154 MG/DL (ref 0–199)
CO2: 22 MMOL/L (ref 22–29)
CREAT SERPL-MCNC: 1.4 MG/DL (ref 0.5–1)
EOSINOPHILS ABSOLUTE: 0.1 E9/L (ref 0.05–0.5)
EOSINOPHILS RELATIVE PERCENT: 1.9 % (ref 0–6)
GFR AFRICAN AMERICAN: 43
GFR NON-AFRICAN AMERICAN: 35 ML/MIN/1.73
GLUCOSE BLD-MCNC: 108 MG/DL (ref 74–99)
HCT VFR BLD CALC: 35.7 % (ref 34–48)
HDLC SERPL-MCNC: 95 MG/DL
HEMOGLOBIN: 11.1 G/DL (ref 11.5–15.5)
IMMATURE GRANULOCYTES #: 0.01 E9/L
IMMATURE GRANULOCYTES %: 0.2 % (ref 0–5)
LDL CHOLESTEROL CALCULATED: 33 MG/DL (ref 0–99)
LYMPHOCYTES ABSOLUTE: 0.73 E9/L (ref 1.5–4)
LYMPHOCYTES RELATIVE PERCENT: 13.9 % (ref 20–42)
MCH RBC QN AUTO: 30.7 PG (ref 26–35)
MCHC RBC AUTO-ENTMCNC: 31.1 % (ref 32–34.5)
MCV RBC AUTO: 98.9 FL (ref 80–99.9)
MONOCYTES ABSOLUTE: 0.62 E9/L (ref 0.1–0.95)
MONOCYTES RELATIVE PERCENT: 11.8 % (ref 2–12)
NEUTROPHILS ABSOLUTE: 3.72 E9/L (ref 1.8–7.3)
NEUTROPHILS RELATIVE PERCENT: 71.1 % (ref 43–80)
PDW BLD-RTO: 13.6 FL (ref 11.5–15)
PLATELET # BLD: 177 E9/L (ref 130–450)
PMV BLD AUTO: 11.8 FL (ref 7–12)
POTASSIUM SERPL-SCNC: 4.6 MMOL/L (ref 3.5–5)
RBC # BLD: 3.61 E12/L (ref 3.5–5.5)
SODIUM BLD-SCNC: 139 MMOL/L (ref 132–146)
TOTAL PROTEIN: 7.1 G/DL (ref 6.4–8.3)
TRIGL SERPL-MCNC: 130 MG/DL (ref 0–149)
TSH SERPL DL<=0.05 MIU/L-ACNC: 4.24 UIU/ML (ref 0.27–4.2)
VLDLC SERPL CALC-MCNC: 26 MG/DL
WBC # BLD: 5.2 E9/L (ref 4.5–11.5)

## 2019-12-31 PROCEDURE — 84443 ASSAY THYROID STIM HORMONE: CPT

## 2019-12-31 PROCEDURE — 85025 COMPLETE CBC W/AUTO DIFF WBC: CPT

## 2019-12-31 PROCEDURE — 80061 LIPID PANEL: CPT

## 2019-12-31 PROCEDURE — 80053 COMPREHEN METABOLIC PANEL: CPT

## 2020-05-20 ENCOUNTER — TELEPHONE (OUTPATIENT)
Dept: INFUSION THERAPY | Age: 85
End: 2020-05-20

## 2020-05-26 ENCOUNTER — HOSPITAL ENCOUNTER (OUTPATIENT)
Dept: MAMMOGRAPHY | Age: 85
Discharge: HOME | End: 2020-05-28
Payer: MEDICARE

## 2020-05-26 PROCEDURE — 77067 SCR MAMMO BI INCL CAD: CPT

## 2020-06-09 ENCOUNTER — VIRTUAL VISIT (OUTPATIENT)
Dept: ONCOLOGY | Age: 85
End: 2020-06-09
Payer: MEDICARE

## 2020-06-09 PROCEDURE — 4040F PNEUMOC VAC/ADMIN/RCVD: CPT | Performed by: INTERNAL MEDICINE

## 2020-06-09 PROCEDURE — G8417 CALC BMI ABV UP PARAM F/U: HCPCS | Performed by: INTERNAL MEDICINE

## 2020-06-09 PROCEDURE — 1123F ACP DISCUSS/DSCN MKR DOCD: CPT | Performed by: INTERNAL MEDICINE

## 2020-06-09 PROCEDURE — G8428 CUR MEDS NOT DOCUMENT: HCPCS | Performed by: INTERNAL MEDICINE

## 2020-06-09 PROCEDURE — 1090F PRES/ABSN URINE INCON ASSESS: CPT | Performed by: INTERNAL MEDICINE

## 2020-06-09 PROCEDURE — 1036F TOBACCO NON-USER: CPT | Performed by: INTERNAL MEDICINE

## 2020-06-09 PROCEDURE — 99214 OFFICE O/P EST MOD 30 MIN: CPT | Performed by: INTERNAL MEDICINE

## 2020-06-09 NOTE — PROGRESS NOTES
noted osteoporosis. She underwent a left needle localized lumpectomy on Jessi 3, 2015. Pathological evaluation demonstrated:  Breast, left at 6 o'clock, excision: Ductal carcinoma, invasive, moderately differentiated (Grade 2). Margins of resection are free of tumor. Tumor lies 5 mm from the closest (anterior) margin of resection. Tumor size: Invasive carcinoma measures 6 mm in greatest dimension  Pathologic staging (pTNM): pT1b, pNX  Stage I. Osteoporosis precluded AI therapy; Patient was a candidate for Tamoxifen. Tamoxifen 20 mg po daily was started on 06/23/2015. Telephone visit; time spent 21 mins. Provider in clinic. Patient home. Completed 5 years of Tamoxifen this June 2020. Bilateral screening mammogram on 05/26/2020 negative for malignancy. Review of Systems;  CONSTITUTIONAL: No fever, chills. Denies hot flashes. Fair appetite and energy level. ENMT: Eyes: No diplopia; Nose: No epistaxis. Mouth: No sore throat. RESPIRATORY: No hemoptysis, shortness of breath, cough. CARDIOVASCULAR: No chest pain, palpitations. GASTROINTESTINAL: No nausea/vomiting, abdominal pain, diarrhea/constipation. GENITOURINARY: No dysuria, urinary frequency, hematuria. MSK: Mild arthralgias, stable. Uses walker for ambulation. NEURO: No syncope, presyncope, headache. Remainder ROS: Negative    Past Medical History:      Diagnosis Date    Cancer ([address]     melonoma - back    CHF (congestive heart failure) (HCC)     Chronic back pain     Hyperlipidemia     Hypertension     Macular degeneration     Osteoarthritis     Restless legs syndrome      Medications:  Reviewed and reconciled. Allergies: Allergies   Allergen Reactions    Flagyl [Metronidazole] Hives     Impression/Plan:  80y.o. year old woman who underwent a left needle localized lumpectomy on Jessi 3, 2015.  Pathological evaluation demonstrated:  Breast, left at 6 o'clock, excision: Ductal carcinoma, invasive, moderately differentiated (Grade 2). Margins of resection are free of tumor. Tumor lies 5 mm from the closest (anterior) margin of resection. Tumor size: Invasive carcinoma measures 6 mm in greatest dimension  Pathologic staging (pTNM): pT1b, pNX  Stage I. Osteoporosis on DEXA scan April 2015 precluded AI therapy; Patient was a candidate for Tamoxifen. Tamoxifen 20 mg po daily was started on 06/23/2015. Bilateral Diagnostic Mammogram/Left Breast U/S on 05/22/2019: There is a new small asymmetry medial to the scar in the central left breast on the cc view. This is not well seen on the MLO view. Ultrasound performed near the surgical scar documenting no evidence of a mass. Left Diagnostic Mammogram 12/05/2019 Negative for malignancy. Bilateral screening mammogram 05/26/2020 negative for malignancy. Completed Tamoxifen this June 2020. Telephone visit 06/09/2020; time spent 21 mins. provider in clinic. Patient home. No clinical evidence of recurrence.      RTC in one year with prior mammogram.    Keturah Villanueva MD   4/9/5175  Board Certified Medical Oncologist   [facility] MEDICAL ONCOLOGY   [facility] [place]

## 2020-07-20 ENCOUNTER — HOSPITAL ENCOUNTER (OUTPATIENT)
Dept: INTERVENTIONAL RADIOLOGY/VASCULAR | Age: 85
Discharge: HOME | End: 2020-07-22
Payer: MEDICARE

## 2020-07-20 PROCEDURE — 93971 EXTREMITY STUDY: CPT

## 2020-07-28 ENCOUNTER — HOSPITAL ENCOUNTER (OUTPATIENT)
Age: 85
Discharge: HOME | End: 2020-07-30
Payer: MEDICARE

## 2020-08-06 ENCOUNTER — APPOINTMENT (OUTPATIENT)
Dept: GENERAL RADIOLOGY | Age: 85
End: 2020-08-06
Payer: MEDICARE

## 2020-08-06 ENCOUNTER — HOSPITAL ENCOUNTER (EMERGENCY)
Age: 85
Discharge: HOME | End: 2020-08-06
Attending: EMERGENCY MEDICINE
Payer: MEDICARE

## 2020-08-06 VITALS
DIASTOLIC BLOOD PRESSURE: 90 MMHG | HEART RATE: 88 BPM | SYSTOLIC BLOOD PRESSURE: 193 MMHG | RESPIRATION RATE: 18 BRPM | TEMPERATURE: 98.4 F | OXYGEN SATURATION: 98 %

## 2020-08-06 PROCEDURE — 6360000002 HC RX W HCPCS: Performed by: STUDENT IN AN ORGANIZED HEALTH CARE EDUCATION/TRAINING PROGRAM

## 2020-08-06 PROCEDURE — 99284 EMERGENCY DEPT VISIT MOD MDM: CPT

## 2020-08-06 PROCEDURE — 72100 X-RAY EXAM L-S SPINE 2/3 VWS: CPT

## 2020-08-06 PROCEDURE — 96372 THER/PROPH/DIAG INJ SC/IM: CPT

## 2020-08-06 PROCEDURE — 73502 X-RAY EXAM HIP UNI 2-3 VIEWS: CPT

## 2020-08-06 PROCEDURE — 99283 EMERGENCY DEPT VISIT LOW MDM: CPT

## 2020-08-06 RX ORDER — ACETAMINOPHEN AND CODEINE PHOSPHATE 300; 30 MG/1; MG/1
1 TABLET ORAL EVERY 4 HOURS PRN
Qty: 18 TABLET | Refills: 0 | Status: SHIPPED | OUTPATIENT
Start: 2020-08-06 | End: 2020-08-09

## 2020-08-06 RX ORDER — FENTANYL CITRATE 50 UG/ML
25 INJECTION, SOLUTION INTRAMUSCULAR; INTRAVENOUS ONCE
Status: COMPLETED | OUTPATIENT
Start: 2020-08-06 | End: 2020-08-06

## 2020-08-06 RX ORDER — KETOROLAC TROMETHAMINE 15 MG/ML
15 INJECTION, SOLUTION INTRAMUSCULAR; INTRAVENOUS ONCE
Status: COMPLETED | OUTPATIENT
Start: 2020-08-06 | End: 2020-08-06

## 2020-08-06 RX ADMIN — FENTANYL CITRATE 25 MCG: 50 INJECTION, SOLUTION INTRAMUSCULAR; INTRAVENOUS at 18:20

## 2020-08-06 RX ADMIN — KETOROLAC TROMETHAMINE 15 MG: 15 INJECTION, SOLUTION INTRAMUSCULAR; INTRAVENOUS at 18:19

## 2020-08-06 ASSESSMENT — ENCOUNTER SYMPTOMS
SHORTNESS OF BREATH: 0
EYE PAIN: 0
ABDOMINAL DISTENTION: 0
SORE THROAT: 0
SINUS PRESSURE: 0
BACK PAIN: 1
DIARRHEA: 0
COUGH: 0
VOMITING: 0
EYE DISCHARGE: 0
WHEEZING: 0
EYE REDNESS: 0
NAUSEA: 0

## 2020-08-06 ASSESSMENT — PAIN DESCRIPTION - PAIN TYPE
TYPE: CHRONIC PAIN
TYPE: ACUTE PAIN
TYPE: ACUTE PAIN

## 2020-08-06 ASSESSMENT — PAIN SCALES - GENERAL
PAINLEVEL_OUTOF10: 4
PAINLEVEL_OUTOF10: 10

## 2020-08-06 ASSESSMENT — PAIN DESCRIPTION - PROGRESSION: CLINICAL_PROGRESSION: GRADUALLY WORSENING

## 2020-08-06 ASSESSMENT — PAIN DESCRIPTION - ORIENTATION: ORIENTATION: LEFT

## 2020-08-06 ASSESSMENT — PAIN DESCRIPTION - LOCATION: LOCATION: BACK

## 2020-08-06 ASSESSMENT — PAIN DESCRIPTION - DESCRIPTORS: DESCRIPTORS: BURNING;CONSTANT

## 2020-08-06 ASSESSMENT — PAIN DESCRIPTION - FREQUENCY: FREQUENCY: CONTINUOUS

## 2020-08-06 ASSESSMENT — PAIN DESCRIPTION - ONSET: ONSET: GRADUAL

## 2020-08-06 ASSESSMENT — PAIN - FUNCTIONAL ASSESSMENT: PAIN_FUNCTIONAL_ASSESSMENT: PREVENTS OR INTERFERES SOME ACTIVE ACTIVITIES AND ADLS

## 2020-08-06 NOTE — ED NOTES
Patient returned from x-ray.       Tricia Love, [facility]  08/06/20 2162

## 2020-08-07 NOTE — ED PROVIDER NOTES
Patient presents with back pain. Patient states that she went to see her ortho and was given a medrol dose pack for her hip pain. After taking her second dose she felt a burning pain in her back. She called the pharmacist who advised her to stop taking her medication and the physicians office who advised her to come to the ED since the surgeon was not in office. Back Pain   Location:  Sacro-iliac joint  Quality:  Burning  Radiates to:  Does not radiate  Pain severity:  Severe  Pain is:  Same all the time  Onset quality:  Sudden  Duration:  1 day  Timing:  Constant  Progression:  Unchanged  Chronicity:  New  Context: not falling, not lifting heavy objects and not recent injury    Relieved by:  None tried  Worsened by:  Standing and touching  Associated symptoms: no chest pain, no dysuria, no fever, no headaches, no leg pain, no numbness and no weakness    Risk factors: hx of osteoporosis and menopause         Review of Systems   Constitutional: Negative for chills and fever. HENT: Negative for ear pain, sinus pressure and sore throat. Eyes: Negative for pain, discharge and redness. Respiratory: Negative for cough, shortness of breath and wheezing. Cardiovascular: Negative for chest pain. Gastrointestinal: Negative for abdominal distention, diarrhea, nausea and vomiting. Genitourinary: Negative for dysuria and frequency. Musculoskeletal: Positive for back pain and gait problem. Negative for arthralgias. Skin: Negative for rash and wound. Neurological: Negative for weakness, numbness and headaches. Hematological: Negative for adenopathy. All other systems reviewed and are negative. Physical Exam  Vitals signs and nursing note reviewed. Constitutional:       General: She is not in acute distress. Appearance: Normal appearance. She is well-developed and normal weight. HENT:      Head: Normocephalic and atraumatic.    Eyes:      Extraocular Movements: Extraocular movements intact. Conjunctiva/sclera: Conjunctivae normal.   Neck:      Musculoskeletal: Normal range of motion and neck supple. No muscular tenderness. Cardiovascular:      Rate and Rhythm: Normal rate and regular rhythm. Heart sounds: Normal heart sounds. No murmur. Pulmonary:      Effort: Pulmonary effort is normal. No respiratory distress. Breath sounds: Normal breath sounds. No wheezing or rales. Abdominal:      General: Bowel sounds are normal.      Palpations: Abdomen is soft. Tenderness: There is no abdominal tenderness. There is no guarding or rebound. Musculoskeletal:         General: Tenderness present. No swelling, deformity or signs of injury. Skin:     General: Skin is warm and dry. Neurological:      Mental Status: She is alert and oriented to person, place, and time. Cranial Nerves: No cranial nerve deficit. Coordination: Coordination normal.          Procedures     MDM  Number of Diagnoses or Management Options  Degenerative disc disease, lumbar:   Hip arthritis:   SI joint arthritis:   Diagnosis management comments: Pain directly over left SI joint, tender to palpation. Patient states that she can barely walk due to the pain. Denies falling. Likely SI Joint arthritis given age and lack of other symptoms. Lumbar and Left Hip XRs ordered. Confirmed arthritis of SI joint, Lumbar spine, and Left hip. Patient given fentanyl 25mcg and toradol 15mg for pain. Symptomatic pain decreased from 8 to 3. Patient agreeable for discharge. Discharged with Tylenol #3 for pain at home and recommended to follow up with PCP and orthopedic surgeon for long term care of her pain.         Amount and/or Complexity of Data Reviewed  Tests in the radiology section of CPT®: ordered and reviewed         ED Course as of Aug 06 2000   Thu Aug 06, 2020   0359 ATTENDING PROVIDER ATTESTATION:     I have personally performed and/or participated in the history, exam, medical decision making, and procedures and agree with all pertinent clinical information unless otherwise noted. I have also reviewed and agree with the past medical, family and social history unless otherwise noted. I have discussed this patient in detail with the resident, and provided the instruction and education regarding patient here complaining of left low back, left hip pain for the last several days worsened with weightbearing and ambulation. No fall or injury. No fevers, sweats or chills. No night sweats or weight loss. No changes in bowel or bladder function. No saddle anesthesia. No lower extremity weakness or numbness. No dysuria or flank pain or hematuria. Took some steroids at home with no improvement and states that she then developed some burning sensation in that area but has no rash. .  My findings/plan: Patient with no rash to the low back or left hip region. She has general left SI joint, iliac crest area and lateral hip area tenderness on palpation with no associated rash. No gross deformities. No actual midline cervical, thoracic or lumbar spine tenderness and no CVA tenderness. Abdomen is soft and nontender with no pulsatile mass. Arms and legs are neurovascular intact with no pretibial edema or calf pain and no sign of acute bone or joint injury to the bilateral lower extremities. [NC]      ED Course User Index  [NC] [address] [facility]        ED Course as of Aug 06 2011   Thu Aug 06, 2020   0885 ATTENDING PROVIDER ATTESTATION:     I have personally performed and/or participated in the history, exam, medical decision making, and procedures and agree with all pertinent clinical information unless otherwise noted. I have also reviewed and agree with the past medical, family and social history unless otherwise noted.     I have discussed this patient in detail with the resident, and provided the instruction and education regarding patient here complaining of left low back, left hip pain for the last several days worsened with weightbearing and ambulation. No fall or injury. No fevers, sweats or chills. No night sweats or weight loss. No changes in bowel or bladder function. No saddle anesthesia. No lower extremity weakness or numbness. No dysuria or flank pain or hematuria. Took some steroids at home with no improvement and states that she then developed some burning sensation in that area but has no rash. .  My findings/plan: Patient with no rash to the low back or left hip region. She has general left SI joint, iliac crest area and lateral hip area tenderness on palpation with no associated rash. No gross deformities. No actual midline cervical, thoracic or lumbar spine tenderness and no CVA tenderness. Abdomen is soft and nontender with no pulsatile mass. Arms and legs are neurovascular intact with no pretibial edema or calf pain and no sign of acute bone or joint injury to the bilateral lower extremities. [NC]      ED Course User Index  [NC] Frank Caicedo, DO       --------------------------------------------- PAST HISTORY ---------------------------------------------  Past Medical History:  has a past medical history of Cancer ([facility] [address] [facility] Chronic back pain, Hyperlipidemia, Hypertension, Macular degeneration, Osteoarthritis, and Restless legs syndrome. Past Surgical History:  has a past surgical history that includes Hysterectomy; Mohs surgery; Colonoscopy (5/27/15); Breast surgery (Left, 6/3/15); and Cataract removal with implant (Right, 3 8 16). Social History:  reports that she has never smoked. She has never used smokeless tobacco. She reports that she does not drink alcohol or use drugs. Family History: family history includes Cancer in her mother. The patients home medications have been reviewed.     Allergies: Flagyl [metronidazole]    -------------------------------------------------- RESULTS -------------------------------------------------  Labs:  No results found for this visit on 08/06/20. Radiology:  XR HIP LEFT (2-3 VIEWS)   Final Result   1. There is no fracture dislocation of the left hip. 2. Degenerative changes of the left hip, left sacroiliac joint and lower   lumbar spine. XR LUMBAR SPINE (2-3 VIEWS)   Final Result   1. Significant multilevel degenerative disc and degenerative joint disease. 2. There is no compression fracture. 3. Grade 1 anterolisthesis of L3 on L4 and L4 on L5.             ------------------------- NURSING NOTES AND VITALS REVIEWED ---------------------------  Date / Time Roomed:  8/6/2020  5:41 PM  ED Bed Assignment:  19/19    The nursing notes within the ED encounter and vital signs as below have been reviewed. BP (!) 193/90   Pulse 88   Temp 98.4 °F (36.9 °C)   Resp 18   SpO2 98%   Oxygen Saturation Interpretation: Normal      ------------------------------------------ PROGRESS NOTES ------------------------------------------  8:11 PM EDT  I have spoken with the patient and discussed todays results, in addition to providing specific details for the plan of care and counseling regarding the diagnosis and prognosis. Their questions are answered at this time and they are agreeable with the plan. I discussed at length with them reasons for immediate return here for re evaluation. They will followup with their primary care physician and orthopedic physician by calling their office tomorrow. --------------------------------- ADDITIONAL PROVIDER NOTES ---------------------------------  At this time the patient is without objective evidence of an acute process requiring hospitalization or inpatient management. They have remained hemodynamically stable throughout their entire ED visit and are stable for discharge with outpatient follow-up.      The plan has been discussed in detail and they are aware of the specific conditions for emergent return, as

## 2020-08-07 NOTE — ED NOTES
Discharge instructions and medications reviewed, patient verbalized understanding     Carol Tsai RN  08/06/20 2015

## 2021-03-16 LAB
ALBUMIN SERPL-MCNC: 4.2 G/DL (ref 3.5–5.2)
ALP BLD-CCNC: 99 U/L (ref 35–104)
ALT SERPL-CCNC: 10 U/L (ref 0–32)
ANION GAP SERPL CALCULATED.3IONS-SCNC: 16 MMOL/L (ref 7–16)
AST SERPL-CCNC: 26 U/L (ref 0–31)
BASOPHILS ABSOLUTE: 0.07 E9/L (ref 0–0.2)
BASOPHILS RELATIVE PERCENT: 1.3 % (ref 0–2)
BILIRUB SERPL-MCNC: 0.3 MG/DL (ref 0–1.2)
BUN BLDV-MCNC: 38 MG/DL (ref 8–23)
CALCIUM SERPL-MCNC: 10.4 MG/DL (ref 8.6–10.2)
CHLORIDE BLD-SCNC: 104 MMOL/L (ref 98–107)
CHOLESTEROL, TOTAL: 170 MG/DL (ref 0–199)
CO2: 25 MMOL/L (ref 22–29)
CREAT SERPL-MCNC: 1.3 MG/DL (ref 0.5–1)
EOSINOPHILS ABSOLUTE: 0.07 E9/L (ref 0.05–0.5)
EOSINOPHILS RELATIVE PERCENT: 1.3 % (ref 0–6)
GFR AFRICAN AMERICAN: 46
GFR NON-AFRICAN AMERICAN: 38 ML/MIN/1.73
GLUCOSE BLD-MCNC: 83 MG/DL (ref 74–99)
HCT VFR BLD CALC: 37.6 % (ref 34–48)
HDLC SERPL-MCNC: 91 MG/DL
HEMOGLOBIN: 11.6 G/DL (ref 11.5–15.5)
IMMATURE GRANULOCYTES #: 0.01 E9/L
IMMATURE GRANULOCYTES %: 0.2 % (ref 0–5)
LDL CHOLESTEROL CALCULATED: 59 MG/DL (ref 0–99)
LYMPHOCYTES ABSOLUTE: 0.87 E9/L (ref 1.5–4)
LYMPHOCYTES RELATIVE PERCENT: 16.5 % (ref 20–42)
MCH RBC QN AUTO: 30.3 PG (ref 26–35)
MCHC RBC AUTO-ENTMCNC: 30.9 % (ref 32–34.5)
MCV RBC AUTO: 98.2 FL (ref 80–99.9)
MONOCYTES ABSOLUTE: 0.56 E9/L (ref 0.1–0.95)
MONOCYTES RELATIVE PERCENT: 10.6 % (ref 2–12)
NEUTROPHILS ABSOLUTE: 3.7 E9/L (ref 1.8–7.3)
NEUTROPHILS RELATIVE PERCENT: 70.1 % (ref 43–80)
PDW BLD-RTO: 15.1 FL (ref 11.5–15)
PLATELET # BLD: 206 E9/L (ref 130–450)
PMV BLD AUTO: 11.7 FL (ref 7–12)
POTASSIUM SERPL-SCNC: 4.6 MMOL/L (ref 3.5–5)
RBC # BLD: 3.83 E12/L (ref 3.5–5.5)
SODIUM BLD-SCNC: 145 MMOL/L (ref 132–146)
TOTAL PROTEIN: 7 G/DL (ref 6.4–8.3)
TRIGL SERPL-MCNC: 100 MG/DL (ref 0–149)
TSH SERPL DL<=0.05 MIU/L-ACNC: 2.9 UIU/ML (ref 0.27–4.2)
VLDLC SERPL CALC-MCNC: 20 MG/DL
WBC # BLD: 5.3 E9/L (ref 4.5–11.5)

## 2021-09-29 ENCOUNTER — HOSPITAL ENCOUNTER (OUTPATIENT)
Dept: MAMMOGRAPHY | Age: 86
Discharge: HOME | End: 2021-10-01
Payer: MEDICARE

## 2021-09-29 VITALS — WEIGHT: 139 LBS | BODY MASS INDEX: 25.58 KG/M2 | HEIGHT: 62 IN

## 2021-09-29 DIAGNOSIS — Z12.31 SCREENING MAMMOGRAM, ENCOUNTER FOR: ICD-10-CM

## 2021-09-29 PROCEDURE — 77067 SCR MAMMO BI INCL CAD: CPT

## 2022-01-27 ENCOUNTER — ANESTHESIA EVENT (OUTPATIENT)
Dept: OPERATING ROOM | Age: 87
End: 2022-01-27
Payer: MEDICARE

## 2022-01-27 ENCOUNTER — PREP FOR PROCEDURE (OUTPATIENT)
Dept: OPHTHALMOLOGY | Age: 87
End: 2022-01-27

## 2022-01-27 RX ORDER — SODIUM CHLORIDE 9 MG/ML
25 INJECTION, SOLUTION INTRAVENOUS PRN
Status: CANCELLED | OUTPATIENT
Start: 2022-01-27

## 2022-01-27 RX ORDER — OFLOXACIN 3 MG/ML
1 SOLUTION/ DROPS OPHTHALMIC ONCE
Status: CANCELLED | OUTPATIENT
Start: 2022-01-28

## 2022-01-27 RX ORDER — SODIUM CHLORIDE 0.9 % (FLUSH) 0.9 %
10 SYRINGE (ML) INJECTION EVERY 12 HOURS SCHEDULED
Status: CANCELLED | OUTPATIENT
Start: 2022-01-27

## 2022-01-27 RX ORDER — SODIUM CHLORIDE 0.9 % (FLUSH) 0.9 %
10 SYRINGE (ML) INJECTION PRN
Status: CANCELLED | OUTPATIENT
Start: 2022-01-27

## 2022-01-27 RX ORDER — TROPICAMIDE 10 MG/ML
1 SOLUTION/ DROPS OPHTHALMIC SEE ADMIN INSTRUCTIONS
Status: CANCELLED | OUTPATIENT
Start: 2022-01-28

## 2022-01-27 RX ORDER — SODIUM CHLORIDE 9 MG/ML
INJECTION, SOLUTION INTRAVENOUS CONTINUOUS
Status: CANCELLED | OUTPATIENT
Start: 2022-01-27

## 2022-01-27 RX ORDER — PROPARACAINE HYDROCHLORIDE 5 MG/ML
1 SOLUTION/ DROPS OPHTHALMIC SEE ADMIN INSTRUCTIONS
Status: CANCELLED | OUTPATIENT
Start: 2022-01-28

## 2022-01-27 RX ORDER — GABAPENTIN 400 MG/1
400 CAPSULE ORAL NIGHTLY PRN
COMMUNITY

## 2022-01-27 RX ORDER — PHENYLEPHRINE HCL 2.5 %
1 DROPS OPHTHALMIC (EYE) SEE ADMIN INSTRUCTIONS
Status: CANCELLED | OUTPATIENT
Start: 2022-01-28

## 2022-01-27 NOTE — OP NOTE
Operative Note      Patient: Kathryn Nichols  YOB: 1928  MRN: [id number]    Date of Procedure: 1/28/2022    Surgeon(s):  Jyoti German MD      ASSISTANT:  Delroy Polanco PA-C    ANESTHESIA: MAC    PREOPERATIVE DIAGNOSIS: Rhegmatogenous Retinal Detachment, Macula Off  right    POSTOPERATIVE DIAGNOSIS: Rhegmatogenous Retinal Detachment, Macula Off  right    PROCEDURE:  1.Pars Plana Vitrectomy  right                         2. Endolaser, Photocoagulation right                         3. Fluid Gas Exchange right    COMPLICATIONS: NONE    OPERATIVE COURSE: After history, physical, and consent was obtained, the patient was taken to the operating room and given a bolus of IV sedating medication. The patient then received a cam bulbar block of 1:1 Lidocaine and Marcaine to the right eye. The eye was then prepped and draped in a sterile manner and lid speculum was inserted. A transscleral cannula was inserted in the infero-temporal quadrant 3.5 - 4.0 mm posterior to the limbus and a self-retaining infusion cannula was inserted. Additional cannulas were placed at the 2 and 10 Oclock positions. Using a light pipe and a 25-gauge high-speed vitreous cutter, a core vitrectomy was performed. A peripheral vitrectomy was then performed for 360 degrees using shaving mode. Sub-retinal fluid extended 360 degrees including the central macula. Retinal tears were identified at 1 and 7:00 and were marked with intraocular cautery. Scleral depression was performed for 360 degrees to ensure there were no other retinal tears. A drainage retinotomy was made posteriorly and a fluid-air exchange was performed using a soft-tipped cannula or the vitreous cutter. The retina flattened nicely. Endolaser photocoagulation was applied to each retinal tear and the retinotomy. The air was exchanged for 15 % C3f8. The three cannulas were removed and the eye maintained normal intraocular pressure by palpation.  The lid speculum was removed and the eye was dressed with antibiotic ointment, eye patch and shield. The patient was taken from the operating room to the recovery room awake and in good condition. The patient will maintain a facedown position sleeping on left side for 7 days. The patient will follow up in the office tomorrow for a post-operative check.     Electronically signed by Keira Aguilar MD on 1/27/2022 at 6:27 PM

## 2022-01-27 NOTE — H&P
I have examined the patient and reviewed the history and physical from 1/27/22 and find no relevant changes. I have reviewed with the patient and/or family the risks, benefits, and alternatives to the procedure and they have agreed to proceed.     Paulo Brush MD

## 2022-01-27 NOTE — ANESTHESIA PRE PROCEDURE
Department of Anesthesiology  Preprocedure Note       Name:  Mike Martinez   Age:  80 y.o.  :  9/3/1928                                          MRN:  [id number]         Date:  2022      Surgeon: Adri Larson):  Mj Carnes MD    Procedure: Procedure(s):  PARS PLANA VITRECTOMY, ENDO LASER, GAS FLUID EXCHANGE, 25 G, MAC, RIGHT EYE    Medications prior to admission:   Prior to Admission medications    Medication Sig Start Date End Date Taking? Authorizing Provider   gabapentin (NEURONTIN) 400 MG capsule Take 400 mg by mouth nightly as needed. Yes Historical Provider, MD   olmesartan-hydrochlorothiazide (BENICAR HCT) 40-12.5 MG per tablet   Take 1 tablet by mouth daily States her PCP gives her samples, and she has benicar without HCTZ, she will take it preop. Yes Historical Provider, MD   Cholecalciferol (VITAMIN D) 2000 UNITS CAPS capsule Take by mouth daily   Yes Historical Provider, MD   Calcium Carb-Cholecalciferol (CALCIUM 600 + D PO) Take by mouth   Yes Historical Provider, MD   atorvastatin (LIPITOR) 20 MG tablet Take 20 mg by mouth daily   Yes Historical Provider, MD   Misc Natural Products (ENERGY FOCUS PO)   Take by mouth Vitamin for eye health   Yes Historical Provider, MD   acetaminophen (TYLENOL) 325 MG tablet Take 650 mg by mouth every 6 hours as needed for Pain   Yes Historical Provider, MD   Aspirin (ASPIR-81 PO) Take by mouth Follow Dr. Elisa Brambila anti-coagulant orders   Yes Historical Provider, MD       Current medications:    No current facility-administered medications for this encounter. Current Outpatient Medications   Medication Sig Dispense Refill    gabapentin (NEURONTIN) 400 MG capsule Take 400 mg by mouth nightly as needed.  olmesartan-hydrochlorothiazide (BENICAR HCT) 40-12.5 MG per tablet   Take 1 tablet by mouth daily States her PCP gives her samples, and she has benicar without HCTZ, she will take it preop.       Cholecalciferol (VITAMIN D) 2000 UNITS CAPS capsule Take by mouth daily      Calcium Carb-Cholecalciferol (CALCIUM 600 + D PO) Take by mouth      atorvastatin (LIPITOR) 20 MG tablet Take 20 mg by mouth daily      Misc Natural Products (ENERGY FOCUS PO)   Take by mouth Vitamin for eye health      acetaminophen (TYLENOL) 325 MG tablet Take 650 mg by mouth every 6 hours as needed for Pain      Aspirin (ASPIR-81 PO) Take by mouth Follow Dr. Shawn Crane anti-coagulant orders         Allergies: Allergies   Allergen Reactions    Flagyl [Metronidazole] Hives    Adhesive Tape Other (See Comments)     Skin red with blisters    Penicillin G      Per DR Petit Yesenia 2-13-12       Problem List:    Patient Active Problem List   Diagnosis Code    Shoulder bursitis M75.50    Shoulder impingement M75.40    Shoulder pain M25.519    Breast cancer ([address] C50.919    Hypertension I10    Malignant neoplasm of lower-outer quadrant of left female breast ([address] C50.512    Right cataract H26.9       Past Medical History:        Diagnosis Date    Breast cancer ([address]     Cancer ([address]     melonoma - back; left breast CA    Chronic back pain     Hyperlipidemia     Hypertension     Macular degeneration     Osteoarthritis     Restless legs syndrome        Past Surgical History:        Procedure Laterality Date    BREAST LUMPECTOMY      BREAST SURGERY Left 6/3/15    lumpectomy    CATARACT REMOVAL WITH IMPLANT Right 3 8 16    COLONOSCOPY  5/27/15    HYSTERECTOMY      with uterus suspension.  MOHS SURGERY         Social History:    Social History     Tobacco Use    Smoking status: Never Smoker    Smokeless tobacco: Never Used   Substance Use Topics    Alcohol use:  No                                Counseling given: Not Answered      Vital Signs (Current):   Vitals:    01/27/22 1553   Weight: 138 lb (62.6 kg)   Height: 5' 2\" (1.575 m)                                              BP Readings from Last 3 Encounters:   08/06/20 (!) 193/90   12/06/19 (!) 150/72   05/28/19 (!) 191/84       NPO Status:  >8.H                                                                               BMI:   Wt Readings from Last 3 Encounters:   01/27/22 138 lb (62.6 kg)   09/29/21 139 lb (63 kg)   12/06/19 157 lb (71.2 kg)     Body mass index is 25.24 kg/m². CBC:   Lab Results   Component Value Date    WBC 4.7 07/27/2021    RBC 3.67 07/27/2021    HGB 11.2 07/27/2021    HCT 36.2 07/27/2021    MCV 98.6 07/27/2021    RDW 14.1 07/27/2021     07/27/2021       CMP:   Lab Results   Component Value Date     07/27/2021    K 4.5 07/27/2021     07/27/2021    CO2 20 07/27/2021    BUN 42 07/27/2021    CREATININE 1.5 07/27/2021    GFRAA 39 07/27/2021    LABGLOM 32 07/27/2021    GLUCOSE 99 07/27/2021    GLUCOSE 90 04/05/2012    PROT 7.0 07/27/2021    CALCIUM 10.1 07/27/2021    BILITOT 0.3 07/27/2021    ALKPHOS 112 07/27/2021    AST 27 07/27/2021    ALT 13 07/27/2021       POC Tests: No results for input(s): POCGLU, POCNA, POCK, POCCL, POCBUN, POCHEMO, POCHCT in the last 72 hours.     Coags: No results found for: PROTIME, INR, APTT    HCG (If Applicable): No results found for: PREGTESTUR, PREGSERUM, HCG, HCGQUANT     ABGs: No results found for: PHART, PO2ART, HOU4OWB, UTL5BPB, BEART, T3FXUVTW     Type & Screen (If Applicable):  No results found for: LABABO, LABRH    Drug/Infectious Status (If Applicable):  No results found for: HIV, HEPCAB    COVID-19 Screening (If Applicable): No results found for: COVID19        Anesthesia Evaluation  Patient summary reviewed no history of anesthetic complications:   Airway: Mallampati: II  TM distance: >3 FB   Neck ROM: full  Mouth opening: > = 3 FB Dental:    (+) upper dentures, lower dentures and edentulous      Pulmonary:Negative Pulmonary ROS       (-) not a current smoker                          PE comment: Harsh BS Cardiovascular:  Exercise tolerance: poor (<4 METS),   (+) hypertension:, murmur (Gr II/VI murmur), hyperlipidemia        Rhythm: irregular  Rate: normal                    Neuro/Psych:   Negative Neuro/Psych ROS              GI/Hepatic/Renal: Neg GI/Hepatic/Renal ROS            Endo/Other: Negative Endo/Other ROS   (+) malignancy/cancer. Abdominal:         (-) obese       Vascular: Other Findings:           Anesthesia Plan      MAC     ASA 3       Induction: intravenous. MIPS: Prophylactic antiemetics administered. Anesthetic plan and risks discussed with patient. Plan discussed with CRNA.           PAT Chart Review:  Chart reviewed per routine on January 27, 2022 at 4:11 PM by Denia Miguel MD.  (Final assessment and plan per day of surgery team.)      Denia Miguel MD   1/27/2022

## 2022-01-28 ENCOUNTER — HOSPITAL ENCOUNTER (OUTPATIENT)
Age: 87
Setting detail: OUTPATIENT SURGERY
Discharge: HOME | End: 2022-01-28
Attending: OPHTHALMOLOGY | Admitting: OPHTHALMOLOGY
Payer: MEDICARE

## 2022-01-28 ENCOUNTER — ANESTHESIA (OUTPATIENT)
Dept: OPERATING ROOM | Age: 87
End: 2022-01-28
Payer: MEDICARE

## 2022-01-28 VITALS
TEMPERATURE: 98.6 F | DIASTOLIC BLOOD PRESSURE: 75 MMHG | SYSTOLIC BLOOD PRESSURE: 163 MMHG | OXYGEN SATURATION: 100 % | RESPIRATION RATE: 18 BRPM

## 2022-01-28 VITALS
DIASTOLIC BLOOD PRESSURE: 78 MMHG | RESPIRATION RATE: 16 BRPM | OXYGEN SATURATION: 98 % | TEMPERATURE: 98.6 F | HEART RATE: 76 BPM | SYSTOLIC BLOOD PRESSURE: 192 MMHG | WEIGHT: 138 LBS | HEIGHT: 62 IN | BODY MASS INDEX: 25.4 KG/M2

## 2022-01-28 PROCEDURE — 6360000002 HC RX W HCPCS: Performed by: NURSE ANESTHETIST, CERTIFIED REGISTERED

## 2022-01-28 PROCEDURE — 6370000000 HC RX 637 (ALT 250 FOR IP): Performed by: OPHTHALMOLOGY

## 2022-01-28 PROCEDURE — 2500000003 HC RX 250 WO HCPCS: Performed by: OPHTHALMOLOGY

## 2022-01-28 PROCEDURE — 3700000001 HC ADD 15 MINUTES (ANESTHESIA): Performed by: OPHTHALMOLOGY

## 2022-01-28 PROCEDURE — 2500000003 HC RX 250 WO HCPCS: Performed by: PHYSICIAN ASSISTANT

## 2022-01-28 PROCEDURE — 3600000013 HC SURGERY LEVEL 3 ADDTL 15MIN: Performed by: OPHTHALMOLOGY

## 2022-01-28 PROCEDURE — 2709999900 HC NON-CHARGEABLE SUPPLY: Performed by: OPHTHALMOLOGY

## 2022-01-28 PROCEDURE — 7100000011 HC PHASE II RECOVERY - ADDTL 15 MIN: Performed by: OPHTHALMOLOGY

## 2022-01-28 PROCEDURE — 3700000000 HC ANESTHESIA ATTENDED CARE: Performed by: OPHTHALMOLOGY

## 2022-01-28 PROCEDURE — 2580000003 HC RX 258: Performed by: ANESTHESIOLOGY

## 2022-01-28 PROCEDURE — 6370000000 HC RX 637 (ALT 250 FOR IP): Performed by: PHYSICIAN ASSISTANT

## 2022-01-28 PROCEDURE — 3600000003 HC SURGERY LEVEL 3 BASE: Performed by: OPHTHALMOLOGY

## 2022-01-28 PROCEDURE — 2720000010 HC SURG SUPPLY STERILE: Performed by: OPHTHALMOLOGY

## 2022-01-28 PROCEDURE — 7100000010 HC PHASE II RECOVERY - FIRST 15 MIN: Performed by: OPHTHALMOLOGY

## 2022-01-28 RX ORDER — SODIUM CHLORIDE 0.9 % (FLUSH) 0.9 %
10 SYRINGE (ML) INJECTION EVERY 12 HOURS SCHEDULED
Status: DISCONTINUED | OUTPATIENT
Start: 2022-01-28 | End: 2022-01-28 | Stop reason: HOSPADM

## 2022-01-28 RX ORDER — PROPARACAINE HYDROCHLORIDE 5 MG/ML
1 SOLUTION/ DROPS OPHTHALMIC SEE ADMIN INSTRUCTIONS
Status: DISCONTINUED | OUTPATIENT
Start: 2022-01-28 | End: 2022-01-28 | Stop reason: HOSPADM

## 2022-01-28 RX ORDER — DIPHENHYDRAMINE HYDROCHLORIDE 50 MG/ML
12.5 INJECTION INTRAMUSCULAR; INTRAVENOUS
Status: DISCONTINUED | OUTPATIENT
Start: 2022-01-28 | End: 2022-01-28 | Stop reason: HOSPADM

## 2022-01-28 RX ORDER — SODIUM CHLORIDE, SODIUM LACTATE, POTASSIUM CHLORIDE, CALCIUM CHLORIDE 600; 310; 30; 20 MG/100ML; MG/100ML; MG/100ML; MG/100ML
INJECTION, SOLUTION INTRAVENOUS CONTINUOUS
Status: DISCONTINUED | OUTPATIENT
Start: 2022-01-28 | End: 2022-01-28 | Stop reason: HOSPADM

## 2022-01-28 RX ORDER — HYDROCODONE BITARTRATE AND ACETAMINOPHEN 5; 325 MG/1; MG/1
2 TABLET ORAL PRN
Status: DISCONTINUED | OUTPATIENT
Start: 2022-01-28 | End: 2022-01-28 | Stop reason: HOSPADM

## 2022-01-28 RX ORDER — FENTANYL CITRATE 50 UG/ML
INJECTION, SOLUTION INTRAMUSCULAR; INTRAVENOUS PRN
Status: DISCONTINUED | OUTPATIENT
Start: 2022-01-28 | End: 2022-01-28 | Stop reason: SDUPTHER

## 2022-01-28 RX ORDER — PHENYLEPHRINE HCL 2.5 %
1 DROPS OPHTHALMIC (EYE) SEE ADMIN INSTRUCTIONS
Status: DISCONTINUED | OUTPATIENT
Start: 2022-01-28 | End: 2022-01-28 | Stop reason: HOSPADM

## 2022-01-28 RX ORDER — HYDROCODONE BITARTRATE AND ACETAMINOPHEN 5; 325 MG/1; MG/1
1 TABLET ORAL PRN
Status: DISCONTINUED | OUTPATIENT
Start: 2022-01-28 | End: 2022-01-28 | Stop reason: HOSPADM

## 2022-01-28 RX ORDER — FENTANYL CITRATE 50 UG/ML
50 INJECTION, SOLUTION INTRAMUSCULAR; INTRAVENOUS EVERY 5 MIN PRN
Status: DISCONTINUED | OUTPATIENT
Start: 2022-01-28 | End: 2022-01-28 | Stop reason: HOSPADM

## 2022-01-28 RX ORDER — MORPHINE SULFATE 2 MG/ML
1 INJECTION, SOLUTION INTRAMUSCULAR; INTRAVENOUS EVERY 5 MIN PRN
Status: DISCONTINUED | OUTPATIENT
Start: 2022-01-28 | End: 2022-01-28 | Stop reason: HOSPADM

## 2022-01-28 RX ORDER — LABETALOL HYDROCHLORIDE 5 MG/ML
5 INJECTION, SOLUTION INTRAVENOUS EVERY 10 MIN PRN
Status: DISCONTINUED | OUTPATIENT
Start: 2022-01-28 | End: 2022-01-28 | Stop reason: HOSPADM

## 2022-01-28 RX ORDER — PROMETHAZINE HYDROCHLORIDE 25 MG/ML
25 INJECTION, SOLUTION INTRAMUSCULAR; INTRAVENOUS
Status: DISCONTINUED | OUTPATIENT
Start: 2022-01-28 | End: 2022-01-28 | Stop reason: HOSPADM

## 2022-01-28 RX ORDER — MEPERIDINE HYDROCHLORIDE 25 MG/ML
12.5 INJECTION INTRAMUSCULAR; INTRAVENOUS; SUBCUTANEOUS EVERY 5 MIN PRN
Status: DISCONTINUED | OUTPATIENT
Start: 2022-01-28 | End: 2022-01-28 | Stop reason: HOSPADM

## 2022-01-28 RX ORDER — TROPICAMIDE 10 MG/ML
1 SOLUTION/ DROPS OPHTHALMIC SEE ADMIN INSTRUCTIONS
Status: DISCONTINUED | OUTPATIENT
Start: 2022-01-28 | End: 2022-01-28 | Stop reason: HOSPADM

## 2022-01-28 RX ORDER — NEOMYCIN SULFATE, POLYMYXIN B SULFATE, AND DEXAMETHASONE 3.5; 10000; 1 MG/G; [USP'U]/G; MG/G
OINTMENT OPHTHALMIC PRN
Status: DISCONTINUED | OUTPATIENT
Start: 2022-01-28 | End: 2022-01-28 | Stop reason: ALTCHOICE

## 2022-01-28 RX ORDER — OFLOXACIN 3 MG/ML
1 SOLUTION/ DROPS OPHTHALMIC ONCE
Status: COMPLETED | OUTPATIENT
Start: 2022-01-28 | End: 2022-01-28

## 2022-01-28 RX ORDER — SODIUM CHLORIDE 9 MG/ML
INJECTION, SOLUTION INTRAVENOUS CONTINUOUS
Status: DISCONTINUED | OUTPATIENT
Start: 2022-01-28 | End: 2022-01-28 | Stop reason: HOSPADM

## 2022-01-28 RX ORDER — SODIUM CHLORIDE 0.9 % (FLUSH) 0.9 %
10 SYRINGE (ML) INJECTION PRN
Status: DISCONTINUED | OUTPATIENT
Start: 2022-01-28 | End: 2022-01-28 | Stop reason: HOSPADM

## 2022-01-28 RX ORDER — SODIUM CHLORIDE 9 MG/ML
25 INJECTION, SOLUTION INTRAVENOUS PRN
Status: DISCONTINUED | OUTPATIENT
Start: 2022-01-28 | End: 2022-01-28 | Stop reason: HOSPADM

## 2022-01-28 RX ORDER — TETRACAINE HYDROCHLORIDE 5 MG/ML
SOLUTION OPHTHALMIC PRN
Status: DISCONTINUED | OUTPATIENT
Start: 2022-01-28 | End: 2022-01-28 | Stop reason: ALTCHOICE

## 2022-01-28 RX ORDER — BALANCED SALT SOLUTION 6.4; .75; .48; .3; 3.9; 1.7 MG/ML; MG/ML; MG/ML; MG/ML; MG/ML; MG/ML
SOLUTION OPHTHALMIC PRN
Status: DISCONTINUED | OUTPATIENT
Start: 2022-01-28 | End: 2022-01-28 | Stop reason: ALTCHOICE

## 2022-01-28 RX ORDER — HYDRALAZINE HYDROCHLORIDE 20 MG/ML
5 INJECTION INTRAMUSCULAR; INTRAVENOUS EVERY 10 MIN PRN
Status: DISCONTINUED | OUTPATIENT
Start: 2022-01-28 | End: 2022-01-28 | Stop reason: HOSPADM

## 2022-01-28 RX ADMIN — PROPARACAINE HYDROCHLORIDE 1 DROP: 5 SOLUTION/ DROPS OPHTHALMIC at 07:10

## 2022-01-28 RX ADMIN — TROPICAMIDE 1 DROP: 10 SOLUTION/ DROPS OPHTHALMIC at 07:05

## 2022-01-28 RX ADMIN — OFLOXACIN 1 DROP: 3 SOLUTION OPHTHALMIC at 07:10

## 2022-01-28 RX ADMIN — SODIUM CHLORIDE, POTASSIUM CHLORIDE, SODIUM LACTATE AND CALCIUM CHLORIDE: 600; 310; 30; 20 INJECTION, SOLUTION INTRAVENOUS at 07:28

## 2022-01-28 RX ADMIN — FENTANYL CITRATE 50 MCG: 50 INJECTION, SOLUTION INTRAMUSCULAR; INTRAVENOUS at 07:58

## 2022-01-28 RX ADMIN — PHENYLEPHRINE HYDROCHLORIDE 1 DROP: 25 SOLUTION/ DROPS OPHTHALMIC at 07:05

## 2022-01-28 RX ADMIN — FENTANYL CITRATE 50 MCG: 50 INJECTION, SOLUTION INTRAMUSCULAR; INTRAVENOUS at 08:00

## 2022-01-28 ASSESSMENT — LIFESTYLE VARIABLES: SMOKING_STATUS: 0

## 2022-01-28 ASSESSMENT — PAIN SCALES - GENERAL
PAINLEVEL_OUTOF10: 0

## 2022-01-28 ASSESSMENT — PAIN - FUNCTIONAL ASSESSMENT: PAIN_FUNCTIONAL_ASSESSMENT: 0-10

## 2022-01-28 NOTE — ANESTHESIA POSTPROCEDURE EVALUATION
Department of Anesthesiology  Postprocedure Note    Patient: Juliann Ford  MRN: [id number]  YOB: 1928  Date of evaluation: 1/28/2022  Time:  9:10 AM     Procedure Summary     Date: 01/28/22 Room / Location: [address] [place] [address] / [address] [place] [address]    Anesthesia Start: 4225 Anesthesia Stop: 5688    Procedure: PARS PLANA VITRECTOMY, ENDO LASER, GAS FLUID EXCHANGE, 25 G, MAC, RIGHT EYE (Right Eye) Diagnosis: (RHEG RETINA DETACHMENT RIGHT EYE)    Surgeons: Crescencio Bundy MD Responsible Provider: Elizabeth Calix MD    Anesthesia Type: MAC ASA Status: 3          Anesthesia Type: MAC    Shakir Phase I: Shakir Score: 10    Shakir Phase II: Shakir Score: 10    Last vitals: Reviewed and per EMR flowsheets.        Anesthesia Post Evaluation    Patient location during evaluation: PACU  Patient participation: complete - patient participated  Level of consciousness: awake  Airway patency: patent  Nausea & Vomiting: no nausea and no vomiting  Complications: no  Cardiovascular status: blood pressure returned to baseline  Respiratory status: room air and spontaneous ventilation  Hydration status: stable

## 2022-05-16 ENCOUNTER — HOSPITAL ENCOUNTER (INPATIENT)
Age: 87
LOS: 7 days | Discharge: NURSING, REHAB OR LONG-TERM CARE | DRG: 602 | End: 2022-05-23
Attending: INTERNAL MEDICINE | Admitting: INTERNAL MEDICINE
Payer: MEDICARE

## 2022-05-16 PROBLEM — L03.119 CELLULITIS OF LOWER EXTREMITY: Status: ACTIVE | Noted: 2022-05-16

## 2022-05-16 PROCEDURE — 1200000000 HC SEMI PRIVATE

## 2022-05-16 PROCEDURE — 6360000002 HC RX W HCPCS: Performed by: INTERNAL MEDICINE

## 2022-05-16 PROCEDURE — 6370000000 HC RX 637 (ALT 250 FOR IP): Performed by: INTERNAL MEDICINE

## 2022-05-16 PROCEDURE — 87186 SC STD MICRODIL/AGAR DIL: CPT

## 2022-05-16 PROCEDURE — 2500000003 HC RX 250 WO HCPCS: Performed by: INTERNAL MEDICINE

## 2022-05-16 PROCEDURE — 87070 CULTURE OTHR SPECIMN AEROBIC: CPT

## 2022-05-16 PROCEDURE — 87077 CULTURE AEROBIC IDENTIFY: CPT

## 2022-05-16 PROCEDURE — 2580000003 HC RX 258: Performed by: INTERNAL MEDICINE

## 2022-05-16 RX ORDER — ASPIRIN 81 MG/1
81 TABLET ORAL DAILY
Status: DISCONTINUED | OUTPATIENT
Start: 2022-05-17 | End: 2022-05-23 | Stop reason: HOSPADM

## 2022-05-16 RX ORDER — ACETAMINOPHEN 325 MG/1
650 TABLET ORAL EVERY 6 HOURS PRN
Status: DISCONTINUED | OUTPATIENT
Start: 2022-05-16 | End: 2022-05-23 | Stop reason: HOSPADM

## 2022-05-16 RX ORDER — VITAMIN B COMPLEX
2000 TABLET ORAL DAILY
Status: DISCONTINUED | OUTPATIENT
Start: 2022-05-17 | End: 2022-05-23 | Stop reason: HOSPADM

## 2022-05-16 RX ORDER — GABAPENTIN 400 MG/1
400 CAPSULE ORAL NIGHTLY
Status: DISCONTINUED | OUTPATIENT
Start: 2022-05-17 | End: 2022-05-23 | Stop reason: HOSPADM

## 2022-05-16 RX ORDER — BUMETANIDE 0.25 MG/ML
1 INJECTION, SOLUTION INTRAMUSCULAR; INTRAVENOUS EVERY 12 HOURS
Status: DISCONTINUED | OUTPATIENT
Start: 2022-05-16 | End: 2022-05-17

## 2022-05-16 RX ORDER — ATORVASTATIN CALCIUM 20 MG/1
20 TABLET, FILM COATED ORAL NIGHTLY
Status: DISCONTINUED | OUTPATIENT
Start: 2022-05-17 | End: 2022-05-23 | Stop reason: HOSPADM

## 2022-05-16 RX ADMIN — CEFTRIAXONE SODIUM 1000 MG: 1 INJECTION, POWDER, FOR SOLUTION INTRAMUSCULAR; INTRAVENOUS at 19:41

## 2022-05-16 RX ADMIN — BUMETANIDE 1 MG: 0.25 INJECTION, SOLUTION INTRAMUSCULAR; INTRAVENOUS at 20:16

## 2022-05-16 RX ADMIN — SACUBITRIL AND VALSARTAN 1 TABLET: 24; 26 TABLET, FILM COATED ORAL at 20:58

## 2022-05-16 ASSESSMENT — PAIN - FUNCTIONAL ASSESSMENT: PAIN_FUNCTIONAL_ASSESSMENT: ACTIVITIES ARE NOT PREVENTED

## 2022-05-16 ASSESSMENT — PAIN DESCRIPTION - FREQUENCY: FREQUENCY: CONTINUOUS

## 2022-05-16 ASSESSMENT — PAIN DESCRIPTION - PAIN TYPE: TYPE: ACUTE PAIN;CHRONIC PAIN

## 2022-05-16 ASSESSMENT — PAIN SCALES - GENERAL
PAINLEVEL_OUTOF10: 0
PAINLEVEL_OUTOF10: 5

## 2022-05-16 ASSESSMENT — LIFESTYLE VARIABLES: HOW OFTEN DO YOU HAVE A DRINK CONTAINING ALCOHOL: NEVER

## 2022-05-16 ASSESSMENT — PAIN DESCRIPTION - DESCRIPTORS: DESCRIPTORS: ACHING;BURNING;DISCOMFORT

## 2022-05-16 ASSESSMENT — PAIN DESCRIPTION - ORIENTATION: ORIENTATION: RIGHT;LEFT

## 2022-05-16 ASSESSMENT — PAIN DESCRIPTION - LOCATION: LOCATION: LEG

## 2022-05-16 ASSESSMENT — PAIN DESCRIPTION - ONSET: ONSET: GRADUAL

## 2022-05-16 NOTE — LETTER
[address] Medicaid  CERTIFICATION OF NECESSITY  FOR TRANSPORTATION   BY WHEELCHAIR VAN     Individual Information   1. Name: Silverio Vasquez 2. [place] Medicaid Billing Number:    3. Address: [address] [place] [address]      Transportation Provider Information   4. Provider Name: Crissy Weeks   5. [place] Medicaid Provider Number:  National Provider Identifier (NPI):      Certification  7. Criteria:  By signing this document, the practitioner certifies that two statements are true:  A. This individual must be accompanied by a mobility-related assistive device from the point of pick-up to the point of drop-off. B. Transport of this individual by standard passenger vehicle or common carrier is precluded or contraindicated. 8. Period Beginning Date: 5/23/2022     9. Length  [x] Not more than 1 day(s)  [] One Year     Additional Information Relevant to Certification   10. Comments or Explanations, If Necessary or Appropriate     Cellulitis lower extremity, weakness     Certifying Practitioner Information   11. Name of Practitioner: Dr. Kwaku Hair   12. [place] Medicaid Provider Number, If Applicable:    [address] Provider Identifier (NPI):        Signature Information   14. Date of Signature: 5/23/2022   15. Name of Person Signing: Harper Roberts RN   16.  Signature and Professional Designation: Electronically signed by Harper Roberts RN on 5/23/2022 at 2:04 PM     OD [address]00  Rev. 7/2015

## 2022-05-17 LAB
ALBUMIN SERPL-MCNC: 3.2 G/DL (ref 3.5–5.2)
ALP BLD-CCNC: 82 U/L (ref 35–104)
ALT SERPL-CCNC: 15 U/L (ref 0–32)
ANION GAP SERPL CALCULATED.3IONS-SCNC: 11 MMOL/L (ref 7–16)
AST SERPL-CCNC: 21 U/L (ref 0–31)
BILIRUB SERPL-MCNC: <0.2 MG/DL (ref 0–1.2)
BUN BLDV-MCNC: 76 MG/DL (ref 6–23)
CALCIUM SERPL-MCNC: 9.2 MG/DL (ref 8.6–10.2)
CHLORIDE BLD-SCNC: 105 MMOL/L (ref 98–107)
CO2: 24 MMOL/L (ref 22–29)
CREAT SERPL-MCNC: 2.3 MG/DL (ref 0.5–1)
GFR AFRICAN AMERICAN: 24
GFR NON-AFRICAN AMERICAN: 20 ML/MIN/1.73
GLUCOSE BLD-MCNC: 88 MG/DL (ref 74–99)
HCT VFR BLD CALC: 28.6 % (ref 34–48)
HEMOGLOBIN: 9 G/DL (ref 11.5–15.5)
LV EF: 77 %
LVEF MODALITY: NORMAL
MCH RBC QN AUTO: 31.8 PG (ref 26–35)
MCHC RBC AUTO-ENTMCNC: 31.5 % (ref 32–34.5)
MCV RBC AUTO: 101.1 FL (ref 80–99.9)
PDW BLD-RTO: 14.6 FL (ref 11.5–15)
PLATELET # BLD: 179 E9/L (ref 130–450)
PMV BLD AUTO: 10.5 FL (ref 7–12)
POTASSIUM SERPL-SCNC: 5.1 MMOL/L (ref 3.5–5)
RBC # BLD: 2.83 E12/L (ref 3.5–5.5)
SODIUM BLD-SCNC: 140 MMOL/L (ref 132–146)
TOTAL PROTEIN: 6 G/DL (ref 6.4–8.3)
WBC # BLD: 5.1 E9/L (ref 4.5–11.5)

## 2022-05-17 PROCEDURE — 97161 PT EVAL LOW COMPLEX 20 MIN: CPT | Performed by: PHYSICAL THERAPIST

## 2022-05-17 PROCEDURE — 85027 COMPLETE CBC AUTOMATED: CPT

## 2022-05-17 PROCEDURE — 2580000003 HC RX 258: Performed by: INTERNAL MEDICINE

## 2022-05-17 PROCEDURE — 80053 COMPREHEN METABOLIC PANEL: CPT

## 2022-05-17 PROCEDURE — 97535 SELF CARE MNGMENT TRAINING: CPT

## 2022-05-17 PROCEDURE — 6370000000 HC RX 637 (ALT 250 FOR IP): Performed by: INTERNAL MEDICINE

## 2022-05-17 PROCEDURE — 6360000002 HC RX W HCPCS: Performed by: INTERNAL MEDICINE

## 2022-05-17 PROCEDURE — 2500000003 HC RX 250 WO HCPCS: Performed by: INTERNAL MEDICINE

## 2022-05-17 PROCEDURE — 97165 OT EVAL LOW COMPLEX 30 MIN: CPT

## 2022-05-17 PROCEDURE — 36415 COLL VENOUS BLD VENIPUNCTURE: CPT

## 2022-05-17 PROCEDURE — 1200000000 HC SEMI PRIVATE

## 2022-05-17 PROCEDURE — 93306 TTE W/DOPPLER COMPLETE: CPT

## 2022-05-17 RX ORDER — BUMETANIDE 0.25 MG/ML
1 INJECTION, SOLUTION INTRAMUSCULAR; INTRAVENOUS DAILY
Status: DISCONTINUED | OUTPATIENT
Start: 2022-05-17 | End: 2022-05-23 | Stop reason: HOSPADM

## 2022-05-17 RX ADMIN — ACETAMINOPHEN 650 MG: 325 TABLET ORAL at 00:46

## 2022-05-17 RX ADMIN — GABAPENTIN 400 MG: 400 CAPSULE ORAL at 21:32

## 2022-05-17 RX ADMIN — BUMETANIDE 1 MG: 0.25 INJECTION, SOLUTION INTRAMUSCULAR; INTRAVENOUS at 08:16

## 2022-05-17 RX ADMIN — CALCIUM CARBONATE-VITAMIN D TAB 500 MG-200 UNIT 1 TABLET: 500-200 TAB at 08:16

## 2022-05-17 RX ADMIN — CEFEPIME HYDROCHLORIDE 1000 MG: 1 INJECTION, POWDER, FOR SOLUTION INTRAMUSCULAR; INTRAVENOUS at 09:14

## 2022-05-17 RX ADMIN — SACUBITRIL AND VALSARTAN 1 TABLET: 24; 26 TABLET, FILM COATED ORAL at 21:32

## 2022-05-17 RX ADMIN — Medication 2000 UNITS: at 08:16

## 2022-05-17 RX ADMIN — ATORVASTATIN CALCIUM 20 MG: 20 TABLET, FILM COATED ORAL at 21:32

## 2022-05-17 RX ADMIN — SACUBITRIL AND VALSARTAN 1 TABLET: 24; 26 TABLET, FILM COATED ORAL at 08:16

## 2022-05-17 RX ADMIN — ASPIRIN 81 MG: 81 TABLET, COATED ORAL at 08:16

## 2022-05-17 ASSESSMENT — PAIN DESCRIPTION - DESCRIPTORS: DESCRIPTORS: SHARP

## 2022-05-17 ASSESSMENT — PAIN SCALES - GENERAL
PAINLEVEL_OUTOF10: 5
PAINLEVEL_OUTOF10: 0

## 2022-05-17 ASSESSMENT — PAIN DESCRIPTION - LOCATION: LOCATION: LEG

## 2022-05-17 ASSESSMENT — ENCOUNTER SYMPTOMS
SHORTNESS OF BREATH: 1
COUGH: 0

## 2022-05-17 ASSESSMENT — PAIN DESCRIPTION - ORIENTATION: ORIENTATION: RIGHT;LEFT

## 2022-05-17 NOTE — CARE COORDINATION
CM note: attempted to meet with patient, off unit for testing. PT has seen patient, per their assessment patient lives alone in a ranch home with 3 steps to enter, owns a , [address] [facility] and [employer]. Ambulated with ww prior to admission. Will follow for needs but if antibiotics are oral no needs are anticipated.

## 2022-05-17 NOTE — H&P
Department of Internal Medicine            CHIEF COMPLAINT:  Infected leg    HISTORY OF PRESENT ILLNESS:      This is a has been treated for non healing ulcers of left leg and it was healing and suddenly got worse. Her legs are swelling with fluid oozing fluid out. PAST MEDICAL Hx:  Past Medical History:   Diagnosis Date    [facility]     Cancer ([facility]     melonoma - back; left breast CA    Chronic back pain     CKD (chronic kidney disease) stage 3, GFR 30-59 ml/min ([facility])     Hyperlipidemia     Hypertension     Macular degeneration     Osteoarthritis     Restless legs syndrome        PAST SURGICAL Hx:   Past Surgical History:   Procedure Laterality Date    BREAST LUMPECTOMY      BREAST SURGERY Left 6/3/15    lumpectomy    CATARACT REMOVAL WITH IMPLANT Right 3 8 16    COLONOSCOPY  5/27/15    EYE SURGERY Right 1/28/2022    PARS PLANA VITRECTOMY, ENDO LASER, GAS FLUID EXCHANGE, 25 G, MAC, RIGHT EYE performed by Joe Salinas MD at [address]      with uterus suspension.  MOHS SURGERY         FAMILY Hx:  Family History   Problem Relation Age of Onset    Cancer Mother         breast, colon    Breast Cancer Mother        HOME MEDICATIONS:  Prior to Admission medications    Medication Sig Start Date End Date Taking? Authorizing Provider   gabapentin (NEURONTIN) 400 MG capsule Take 400 mg by mouth nightly as needed. Historical Provider, MD   olmesartan-hydrochlorothiazide (BENICAR HCT) 40-12.5 MG per tablet   Take 1 tablet by mouth daily States her PCP gives her samples, and she has benicar without HCTZ, she will take it preop.     Historical Provider, MD   Cholecalciferol (VITAMIN D) 2000 UNITS CAPS capsule Take by mouth daily    Historical Provider, MD   Calcium Carb-Cholecalciferol (CALCIUM 600 + D PO) Take by mouth    Historical Provider, MD   atorvastatin (LIPITOR) 20 MG tablet Take 20 mg by mouth daily    Historical Provider, MD   [facility] Natural Products (ENERGY Not on file    Sexually Abused: Not on file   Housing Stability:     Unable to Pay for Housing in the Last Year: Not on file    Number of Places Lived in the Last Year: Not on file    Unstable Housing in the Last Year: Not on file       ROS:  General:   Denies chills, fatigue, fever, malaise, night sweats or weight loss    Psychological:   Denies anxiety, disorientation or hallucinations    ENT:    Denies epistaxis, headaches, vertigo or visual changes    Cardiovascular:   Denies any chest pain, irregular heartbeats, or palpitations. No paroxysmal nocturnal dyspnea. Respiratory:   Denies shortness of breath, coughing, sputum production, hemoptysis, or wheezing. No orthopnea. Gastrointestinal:   Denies nausea, vomiting, diarrhea, or constipation. Denies any abdominal pain. Denies change in bowel habits or stools. Genito-Urinary:    Denies any urgency, frequency, hematuria. Voiding without difficulty. Musculoskeletal:   Denies joint pain, joint stiffness, joint swelling or muscle pain    Neurology:    Denies any headache or focal neurological deficits. No weakness or paresthesia. Derm:    Non healing ulcer      Extremities:   Swelling and oozing fluid            PHYSICAL EXAM:  VITALS:  Vitals:    05/16/22 1930   BP: (!) 131/55   Pulse: 70   Resp: 16   Temp: 97.6 °F (36.4 °C)   SpO2: 95%         CONSTITUTIONAL:    Awake, alert, cooperative, no apparent distress, and appears stated age    EYES:    PERRL, EOMI, sclera clear, conjunctiva normal    ENT:    Normocephalic, atraumatic, sinuses nontender on palpation. External ears without lesions. Oral pharynx with moist mucus membranes. Tonsils without erythema or exudates. NECK:    Supple, symmetrical, trachea midline, no adenopathy, thyroid symmetric, not enlarged and no tenderness, skin normal, no bruits, no JVD    HEMATOLOGIC/LYMPHATICS:    No cervical lymphadenopathy and no supraclavicular lymphadenopathy    LUNGS:    Symmetric.  No increased work of breathing, good air exchange, clear to auscultation bilaterally, no wheezes, rhonchi, or rales,     CARDIOVASCULAR:    Normal apical impulse, regular rate and rhythm, normal S1 and S2, no S3 or S4, and no murmur noted    ABDOMEN:    No scars, normal bowel sounds, soft, non-distended, non-tender, no masses palpated, no hepatosplenomegaly, no rebound or guarding elicited on palpation     MUSCULOSKELETAL:    There is no redness, warmth, or swelling of the joints. Full range of motion noted. Motor strength is 5 out of 5 all extremities bilaterally. Tone is normal.    NEUROLOGIC:    Awake, alert, oriented to name, place and time. Cranial nerves II-XII are grossly intact. Motor is 5 out of 5 bilaterally.       SKIN:    Non healing ulcer    EXTREMITIES:    Swelling and oozing flui      LABORATORY DATA:  CBC:   Lab Results   Component Value Date    WBC 4.7 07/27/2021    RBC 3.67 07/27/2021    HGB 11.2 07/27/2021    HCT 36.2 07/27/2021    MCV 98.6 07/27/2021    MCH 30.5 07/27/2021    MCHC 30.9 07/27/2021    RDW 14.1 07/27/2021     07/27/2021    MPV 11.7 07/27/2021     CMP:    Lab Results   Component Value Date     07/27/2021    K 4.5 07/27/2021     07/27/2021    CO2 20 07/27/2021    BUN 42 07/27/2021    CREATININE 1.5 07/27/2021    GFRAA 39 07/27/2021    LABGLOM 32 07/27/2021    GLUCOSE 99 07/27/2021    GLUCOSE 90 04/05/2012    PROT 7.0 07/27/2021    LABALBU 4.1 07/27/2021    LABALBU 4.6 04/05/2012    CALCIUM 10.1 07/27/2021    BILITOT 0.3 07/27/2021    ALKPHOS 112 07/27/2021    AST 27 07/27/2021    ALT 13 07/27/2021       ASSESSMENT:  Patient Active Problem List   Diagnosis    Shoulder bursitis    Shoulder impingement    Shoulder pain    [facility]    Hypertension    Malignant neoplasm of lower-outer quadrant of left female breast ([facility]    Right cataract    Cellulitis of lower extremity    Hyperlipidemia    Chronic back pain    [facility]    CKD (chronic kidney disease) stage 3, GFR 30-59 ml/min ([facility])    Chronic diastolic heart failure ([facility])    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 entressto and iv cefapine    Stevie Bob MD, M.D.  Lajune Dubin PM  5/16/2022

## 2022-05-17 NOTE — PLAN OF CARE
Problem: Discharge Planning  Goal: Discharge to home or other facility with appropriate resources  5/17/2022 1216 by Sharon Moreno RN  Outcome: Progressing     Problem: Safety - Adult  Goal: Free from fall injury  5/17/2022 1216 by Sharon Moreno RN  Outcome: Progressing     Problem: ABCDS Injury Assessment  Goal: Absence of physical injury  5/17/2022 1216 by Sharon Moreno RN  Outcome: Progressing     Problem: Pain  Goal: Verbalizes/displays adequate comfort level or baseline comfort level  5/17/2022 1216 by Sharon Moreno RN  Outcome: Progressing     Problem: Skin/Tissue Integrity  Goal: Absence of new skin breakdown  Description: 1. Monitor for areas of redness and/or skin breakdown  2. Assess vascular access sites hourly  3. Every 4-6 hours minimum:  Change oxygen saturation probe site  4. Every 4-6 hours:  If on nasal continuous positive airway pressure, respiratory therapy assess nares and determine need for appliance change or resting period.   5/17/2022 1216 by Sharon Moreno RN  Outcome: Progressing     Problem: Chronic Conditions and Co-morbidities  Goal: Patient's chronic conditions and co-morbidity symptoms are monitored and maintained or improved  Outcome: Progressing

## 2022-05-17 NOTE — DISCHARGE INSTR - COC
Continuity of Care Form    Patient Name: Johanna Gleason   :  9/3/1928  MRN:  [id number]    Admit date:  2022  Discharge date:  ***    Code Status Order: Full Code   Advance Directives:      Admitting Physician: Ho Cervantes MD  PCP: Ho Cervantes MD    Discharging Nurse: [facility] Unit/Room#: 3658/0940-66  Discharging Unit Phone Number: [phone]    Emergency Contact:   Extended Emergency Contact Information  Primary Emergency Contact: Georgi Khan  Address: [address] [place] [address] Phone: [phone]  Mobile Phone: [phone]  Relation: Child  Other needs: None  Preferred language: English   needed? No    Past Surgical History:  Past Surgical History:   Procedure Laterality Date    BREAST LUMPECTOMY      BREAST SURGERY Left 6/3/15    lumpectomy    CATARACT REMOVAL WITH IMPLANT Right 3 8 16    COLONOSCOPY  5/27/15    EYE SURGERY Right 2022    PARS PLANA VITRECTOMY, ENDO LASER, GAS FLUID EXCHANGE, 25 G, MAC, RIGHT EYE performed by Everette Meckel, MD at [address] [place] [address]      with uterus suspension.     MOHS SURGERY         Immunization History:   Immunization History   Administered Date(s) Administered    COVID-19, [employer], DILUTE for use, Abdifatah-Sucrose, 5-11 yrs, PF, 10mcg/0.2 mL dose 2021, 2021, 2021       Active Problems:  Patient Active Problem List   Diagnosis Code    Shoulder bursitis M75.50    Shoulder impingement M75.40    Shoulder pain M25.519    Breast cancer ([address] C50.919    Hypertension I10    Malignant neoplasm of lower-outer quadrant of left female breast ([address] C50.512    Right cataract H26.9    Cellulitis of lower extremity L03.119    Hyperlipidemia E78.5    Chronic back pain M54.9, G89.29    Non-healing ulcer ([address] L98.499    CKD (chronic kidney disease) stage 3, GFR 30-59 ml/min (HCC) N18.30    Chronic diastolic heart failure (HCC) I50.32 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       Isolation/Infection:   Isolation            No Isolation          Patient Infection Status       None to display            Nurse Assessment:  Last Vital Signs: BP (!) 118/50   Pulse 79   Temp 98.4 °F (36.9 °C) (Oral)   Resp 16   Wt 138 lb (62.6 kg)   SpO2 97%   BMI 25.24 kg/m²     Last documented pain score (0-10 scale): Pain Level: 0  Last Weight:   Wt Readings from Last 1 Encounters:   05/17/22 138 lb (62.6 kg)     Mental Status:  oriented and alert    IV Access:  - None    Nursing Mobility/ADLs:  Walking   Assisted  Transfer  Assisted  Bathing  Assisted  Dressing  Assisted  Toileting  Assisted  Feeding  Independent  Med Admin  Independent  Med Delivery   whole    Wound Care Documentation and Therapy:        Elimination:  Continence: Bowel: Yes  Bladder: Yes  Urinary Catheter: None   Colostomy/Ileostomy/Ileal Conduit: No       Date of Last BM:     Intake/Output Summary (Last 24 hours) at 5/17/2022 0856  Last data filed at 5/17/2022 0826  Gross per 24 hour   Intake 990 ml   Output --   Net 990 ml     I/O last 3 completed shifts: In: 18 [P.O.:510]  Out: -     Safety Concerns: At Risk for Falls    Impairments/Disabilities:      None    Nutrition Therapy:  Current Nutrition Therapy:   - Oral Diet:  General    Routes of Feeding: Oral  Liquids: Thin Liquids  Daily Fluid Restriction: no  Last Modified Barium Swallow with Video (Video Swallowing Test): not done    Treatments at the Time of Hospital Discharge:   Respiratory Treatments: none  Oxygen Therapy:  is not on home oxygen therapy.   Ventilator:    - No ventilator support    Rehab Therapies: Physical Therapy and Occupational Therapy  Weight Bearing Status/Restrictions: No weight bearing restrictions  Other Medical Equipment (for information only, NOT a DME order):  walker  Other Treatments: ***    Patient's personal belongings (please select all that are sent with patient):  Hearing Aides bilateral    RN SIGNATURE:  Electronically signed by Sundar Miller MD on 5/23/22 at 9:39 AM EDT    CASE MANAGEMENT/SOCIAL WORK SECTION    Inpatient Status Date: ***    Readmission Risk Assessment Score:  Readmission Risk              Risk of Unplanned Readmission:  10           Discharging to Facility/ Agency   Name:   Address:  Phone:  Fax:    Dialysis Facility (if applicable)   Name:  Address:  Dialysis Schedule:  Phone:  Fax:    / signature: {Esignature:[id number]}    PHYSICIAN SECTION    Prognosis: Good    Condition at Discharge: Stable    Rehab Potential (if transferring to Rehab): Good    Recommended Labs or Other Treatments After Discharge: ***    Physician Certification: I certify the above information and transfer of Yousif Bonds  is necessary for the continuing treatment of the diagnosis listed and that she requires [place] for less 30 days. Update Admission H&P: No change in H&P    PHYSICIAN SIGNATURE:  Electronically signed by Jolene Zhou RN on 5/23/22 at 9:50 AM EDT                     HEART FAILURE  / CONGESTIVE HEART FAILURE  DISCHARGE INSTRUCTIONS:  GUIDELINES TO FOLLOW AT [address] [facility]:     MEDICATIONS:  Take your medication as directed. If you are experiencing any side effects, inform your doctor, Do not stop taking any of your medications without letting your doctor know. Check with your doctor before taking any over-the-counter medications / herbal / or dietary supplements. They may interfere with your other medications. Do not take ibuprofen (Advil or Motrin) and naproxen (Aleve) without talking to your doctor first. They could make your heart failure worse. WEIGHT MONITORING:   Weigh yourself everyday (with the same scale) around the same time of the day and write it down. (you can chart them on a calendar or keep track of them on paper.    Notify your doctor of a weight gain of 3 pounds or more in 1 day   OR a total of 5 pounds or more in 1 week    Take your weight record to your doctor visits  Also, the same goes if you loose more than 3# in one day, let your heart doctor know. DIET:   Cardiac heart healthy diet- Low saturated / low trans fat, no added salt, caffeine restricted, Low sodium diet-   No more than 2,000mg (2 grams) of salt / sodium per day (which equals to a little less than  a teaspoon of salt)  If your doctor wants you on a fluid restriction. ..it is usually recommended a fluid limit of 2,000cc -  Fluid restriction- 2,000 ml (milliliters) = 64 ounces = you can have 8 glasses of fluid per day (each glass 8 ounces)    Follow a low salt diet - avoid using salt at the table, avoid / limit use of canned soups, processed / packaged foods, salted snacks, olives and pickles. Do not use a salt substitute without checking with your doctor, they may contain a high amount of potassioum. (Mrs. Maame Jensen is safe to use). Limit the use of alcohol       CALL YOUR DOCTOR THE FIRST DAY YOU NOTICE ANY OF THESE   SYMPTOMS:  You have a weight gain of 3 pounds or more in 1 day         OR 5 pounds or more in one week  More shortness of breath  More swelling of your stomach, legs, ankles or feet  Feeling more tired, No energy  Dry hacky cough  Dizziness  More chest pain / discomfort       (CALL 911 IF ANY OF THE FOLLOWING OCCURS  Chest pain (not relieved with nitroglycerine, if you have been prescribed this medication)  Severe shortness of breath  Faint / Pass out  Confusion / cannot think clearly  If symptoms get worse           SMOKING - TOBACCO USE:  * IF YOU SMOKE - STOP! Kick the habit. [address] Program is offered at [facility] [address] and [address] [facility]. Call [phone] extension [phone] for more information.              ACTIVITY:   (Ask your doctor when you will be able to return to work and before starting any exercise program.  Do not drive unless unless your doctor has given you permission to do so). Start light exercise. Even if you can only do a small amount, exercise will help you get stronger, have more energy, help manage your weight and decrease  stress. Walking is an easy way to get exercise. Start out slowly and  increase the amount you walk as tolerated  If you become short of breath, dizzy or have chest pain; stop, sit down, and rest.  If you feel \"wiped out\" the day after you exercise, walk at a slower pace or for a shorter distance. You can gradually increase the pace or amount of time. (Do not exercise right after a meal or in extreme temperatures, such as above 85 degrees, if the air is really humid, or wind chill is less than 20 degrees)                                             ADDITIONAL INFORMATION:  Avoid getting sick from colds and the flu. Stay away from friends or family that you know may have a contagious illness  Get plenty of rest   Get a flu shot each year. Get a pneumococcal vaccine shot. If you have had one before, ask your doctor whether you need another dose. My Goal for Self-management of Heart Failure Includes 5 steps :    1. Notice a change in symptoms ( weight gain, short of breath, leg swelling, decreased activity level, bloating. ...)    2. Evaluate the change: (use the Heart Failure Zones )     3. Decide to take action: decide what your options are, such as: (call your doctor for an extra visit, take a prescribed medication, such as your water pill if your doctor has given you directions to do so, [address])    4. Come up with a strategy:  (now you call the doctor for advice / appointment. This is where you take action!!! Do not wait, catch the symptom early and treat it before it worsens. 5. Evaluate the response: The next day, check your Heart Failure Zones: are you in the GREEN ZONE (safe zone)? Worsening symptoms of YELLOW ZONE?  Or have you moved to the RED ZONE and need to call yourself. Follow-up care is a key part of your treatment and safety. Be sure to make and go to all appointments, and call your doctor if you are having problems. It's also a good idea to know your test results and keep a list of the medicines you take. Where can you learn more? Go to [email or web address] org and sign in to your [employer] account. Enter T174 in the Spine Pain Management box to learn more about \"Learning About Heart Failure Zones. \"     If you do not have an account, please click on the \"Sign Up Now\" link. Current as of: August 31, 2020               Content Version: 12.9  © 2006-2021 [employer]orporated. Care instructions adapted under license by [facility] If you have questions about a medical condition or this instruction, always ask your healthcare professional. [address] any warranty or liability for your use of this information.

## 2022-05-17 NOTE — PLAN OF CARE
Chronic Heart Failure per H&P       Patient's chart updated to reflect:      . -HF discharge instructions.     Mateo Castro RN   Heart Failure Navigator

## 2022-05-17 NOTE — PROGRESS NOTES
Physical Therapy Initial Evaluation/Plan of Care    Room #:  0340/0340-01  Patient Name: Nancy Jeffers  YOB: 1928  MRN: [id number]    Date of Service: 5/17/2022     Tentative placement recommendation: Home Health Physical Therapy   Equipment recommendation: Patient has needed equipment       Evaluating Physical Therapist: Melvin Capps #[id number]      Specific Provider Orders/Date/Referring Provider :  05/17/22 0845   PT eval and treat Start: 05/17/22 0845, End: 05/17/22 0845, ONE TIME, Standing Count: 1 Occurrences, R    Reema Morillo MD      Admitting Diagnosis:   Cellulitis of lower extremity, unspecified laterality [C92.789]     non healing ulcers of left leg and it was healing and suddenly got worse. Her legs are swelling with fluid oozing fluid out. 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 Patient requires skilled physical therapy to address concerns listed above to increase safety and independence at discharge.          PHYSICAL THERAPY  PLAN OF CARE       Physical therapy plan of care is established based on physician order,  patient diagnosis and clinical assessment    Current Treatment Recommendations:    -Bed Mobility: Lower extremity exercises -Sitting Balance: Incorporate reaching activities to activate trunk muscles   -Standing Balance: Perform strengthening exercises in standing to promote motor control with or without upper extremity support   -Transfers: Provide instruction on proper hand and foot position for adequate transfer of weight onto lower extremities and use of gait device if needed and Cues for hand placement, technique and safety. Provide stabilization to prevent fall   -Gait: Gait training, Standing activities to improve: base of support, weight shift, weight bearing  and Pregait training to emphasize: Increased step length , Upright and Safety   -Endurance: Utilize Supervised activities to increase level of endurance to allow for safe functional mobility including transfers and gait   -Stairs: Stair training with instruction on proper technique and hand placement on rail    PT long term treatment goals are located in below grid    Patient and or family understand(s) diagnosis, prognosis, and plan of care. Frequency of treatments: Patient will be seen  daily. Prior Level of Function: Patient ambulated with wheeled walker    Rehab Potential: good    for baseline    Past medical history:   Past Medical History:   Diagnosis Date    [facility]     Cancer ([facility]     melonoma - back; left breast CA    Chronic back pain     CKD (chronic kidney disease) stage 3, GFR 30-59 ml/min ([facility])     Hyperlipidemia     Hypertension     Macular degeneration     Osteoarthritis     Restless legs syndrome      Past Surgical History:   Procedure Laterality Date    BREAST LUMPECTOMY      BREAST SURGERY Left 6/3/15    lumpectomy    CATARACT REMOVAL WITH IMPLANT Right 3 8 16    COLONOSCOPY  5/27/15    EYE SURGERY Right 1/28/2022    PARS PLANA VITRECTOMY, ENDO LASER, GAS FLUID EXCHANGE, 25 G, MAC, RIGHT EYE performed by Alexandre Briseno MD at [address]      with uterus suspension.     MOHS SURGERY SUBJECTIVE:    Precautions:  , falls ,    Social history: Patient lives alone in a ranch home  with 3 steps  to enter with [address] bathes     Equipment owned: Wheelchair, Cane and [address],       [address] [place] [address] Pkwy   How much difficulty turning over in bed?: A Little  How much difficulty sitting down on / standing up from a chair with arms?: A Little  How much difficulty moving from lying on back to sitting on side of bed?: A Little  How much help from another person moving to and from a bed to a chair?: A Little  How much help from another person needed to walk in hospital room?: A Little  How much help from another person for climbing 3-5 steps with a railing?: A Lot  AM-PAC Inpatient Mobility Raw Score : 17  AM-PAC Inpatient T-Scale Score : 42.13  Mobility Inpatient CMS 0-100% Score: 50.57  Mobility Inpatient CMS G-Code Modifier : CK    Nursing cleared patient for PT evaluation. The admitting diagnosis and active problem list as listed above have been reviewed prior to the initiation of this evaluation. OBJECTIVE;   Initial Evaluation  Date: 5/17/2022 Treatment Date:     Short Term/ Long Term   Goals   Was pt agreeable to Eval/treatment? Yes  To be met in 3 days   Pain level   5/10  Bilateral ankles     Bed Mobility    Rolling: Minimal assist of 1    Supine to sit: Minimal assist of 1    Sit to supine: Minimal assist of 1    Scooting: Minimal assist of 1    Rolling: Independent    Supine to sit:  Independent    Sit to supine: Independent    Scooting: Independent     Transfers Sit to stand: Minimal assist of 1 Cues for hand placement and safety   Sit to stand: Independent     Ambulation    1 x 15 feet 1 x 10 feet using  wheeled walker with Minimal assist of 1   for walker control and cues for upright posture, increased step length and safety   100 feet using  wheeled walker with Modified Independent    Stair negotiation: ascended and descended   Not assessed 3 steps 1 rail with supervision    ROM Within functional limits    Increase range of motion 10% of affected joints    Strength BUE:  refer to OT eval  RLE:  3+/5  LLE:  3+/5  Increase strength in affected mm groups by 1/3 grade   Balance Sitting EOB:  good    Dynamic Standing:  fair + wheeled walker   Sitting EOB:  good    Dynamic Standing: good wheeled walker      Patient is Alert & Oriented x person, place, time and situation and follows directions    Sensation:  Patient  reports numbness/tingling bilateral upper extremities     Edema:  yes bilateral lower extremities   Endurance: fair  +    Vitals: room air   Blood Pressure at rest  Blood Pressure during session    Heart Rate at rest   Heart Rate during session     SPO2 at rest 96%  SPO2 during session 94%     Patient education  Patient educated on role of Physical Therapy, risks of immobility, safety and plan of care,  importance of mobility while in hospital  and ankle pumps, quad set and glut set for edema control, blood clot prevention     Patient response to education:   Pt verbalized understanding Pt demonstrated skill Pt requires further education in this area   Yes Partial Yes      Treatment:  Patient practiced and was instructed/facilitated in the following treatment: Patient assisted to edge of bed,   Sat edge of bed 5 minutes with Supervision  to increase dynamic sitting balance and activity tolerance. ambulated to bathroom, supervision for balance during hygiene and standing at sink to wash hands. Transport arrived assisted onto cart for ECHO     Therapeutic Exercises:  ankle pumps  x 15 reps. At end of session, patient in care of transport  , nursing notified. Patient would benefit from continued skilled Physical Therapy to improve functional independence and quality of life.          Patient's/ family goals   home    Time in  1415  Time out  1435    Total Treatment Time  0 minutes    Evaluation time includes thorough review of current medical information, gathering information on past medical history/social history and prior level of function, completion of standardized testing/informal observation of tasks, assessment of data, and development of Plan of care and goals.      CPT codes:  Low Complexity PT evaluation (61347)  No treatment    Natalya Sue, PT

## 2022-05-17 NOTE — PROGRESS NOTES
results of clinical assessment    Frequency/Duration 1-3 days/wk for 2 weeks PRN     Specific OT Treatment Interventions to include:   * Instruction/training on adapted ADL techniques and AE recommendations to increase functional independence within precautions       * Training on energy conservation strategies, correct breathing pattern and techniques to improve independence/tolerance for self-care routine  * Functional transfer/mobility training/DME recommendations for increased independence, safety, and fall prevention  * Patient/Family education to increase follow through with safety techniques and functional independence  * Recommendation of environmental modifications for increased safety with functional transfers/mobility and ADLs  * Therapeutic exercise to improve motor endurance, ROM, and functional strength for ADLs/functional transfers  * Therapeutic activities to facilitate/challenge dynamic balance, stand tolerance for increased safety and independence with ADLs  * Positioning to improve skin integrity, interaction with environment and functional independence    Recommended Adaptive Equipment: tub transfer bench (patient will have family look into the bench)     Home Living: alone; single family home, 1 story, 3 steps to enter with rail, tub shower. Equipment owned: wheeled walker, cane, wheelchair, elevated toilet seat with rails    Prior Level of Function: Independent with ADLs , Independent with IADLs; ambulated with wheeled walker, sponge bathes, going shopping with son occasionally and uses the wheelchair     Driving: no  Occupation: retired     Pain Level: 4/10 pain in B LE's; Nursing notified.       Cognition: A&O: 4/4; Follows 2 step directions   Memory: good    Sequencing: good    Problem solving: good    Judgement/safety: good     [facility]   AM-PAC Daily Activity Inpatient   How much help for putting on and taking off regular lower body clothing?: A Lot  How much help for Bathing?: A Edema: yes, B LE's     Comments: Upon arrival the patient was seated in bedside chair. At end of session, patient was returned to bedside chair with call light and phone within reach, all lines and tubes intact. Warm blanket applied. Overall patient demonstrated decreased independence and safety during completion of ADL/functional transfer/mobility tasks. Pt would benefit from continued skilled OT to increase safety and independence with completion of ADL/IADL tasks for functional independence and quality of life. Treatment: OT treatment provided this date includes:    Instruction/training on safety and adapted techniques for completion of ADLs    Instruction/training on safe functional mobility/transfer techniques    Instruction/training on energy conservation/work simplification for completion of ADLs    Proper Positioning/Alignment    Rehab Potential: Good for established goals. Patient / Family Goal: return home      Patient and/or family were instructed on functional diagnosis, prognosis/goals and OT plan of care. Demonstrated good understanding. Eval Complexity: Low    Time In: 3:05pm  Time Out: 3:35pm    Total Treatment Time: 10      Min Units   OT Eval Low 97165  X  1    OT Eval Medium 49664      OT Eval High 72149      OT Re-Eval 08910            ADL/Self Care 16045  8   1    Therapeutic Activities 56868  2      Therapeutic Ex 94671       Orthotic Management 68061       Manual 90175     Neuro Re-Ed 77660       Non-Billable Time        Evaluation Time additionally includes thorough review of current medical information, gathering information on past medical history/social history and prior level of function, interpretation of standardized testing/informal observation of tasks, assessment of data and development of plan of care and goals.         Evaluating OT: Kaela Schneider OTR/L; [id number]

## 2022-05-17 NOTE — PLAN OF CARE
Problem: Discharge Planning  Goal: Discharge to home or other facility with appropriate resources  Outcome: Progressing  Flowsheets (Taken 5/16/2022 3544 by Osmany Luz RN)  Discharge to home or other facility with appropriate resources:   Identify barriers to discharge with patient and caregiver   Arrange for needed discharge resources and transportation as appropriate   Identify discharge learning needs (meds, wound care, etc)   Arrange for interpreters to assist at discharge as needed   Refer to discharge planning if patient needs post-hospital services based on physician order or complex needs related to functional status, cognitive ability or social support system

## 2022-05-17 NOTE — PLAN OF CARE
Problem: Discharge Planning  Goal: Discharge to home or other facility with appropriate resources  5/17/2022 0338 by Nava Bernaeb RN  Outcome: Progressing  5/17/2022 0308 by Nava Bernabe RN  Outcome: Progressing  Flowsheets (Taken 5/16/2022 1753 by Ed Lopez RN)  Discharge to home or other facility with appropriate resources:   Identify barriers to discharge with patient and caregiver   Arrange for needed discharge resources and transportation as appropriate   Identify discharge learning needs (meds, wound care, etc)   Arrange for interpreters to assist at discharge as needed   Refer to discharge planning if patient needs post-hospital services based on physician order or complex needs related to functional status, cognitive ability or social support system

## 2022-05-17 NOTE — PROGRESS NOTES
Dr. Ruperto Bautista MD,    Your patient is on a medication that requires a renal dose adjustment. Renal Function Assessment:    Date Body Weight IBW  Adjusted BW SCr  CrCl Dialysis status   5/17/2022 138 lb (62.6 kg)  Ideal body weight: 50.1 kg (110 lb 7.2 oz)  Adjusted ideal body weight: 55.1 kg (121 lb 7.5 oz) Serum creatinine: 2.3 mg/dL (H) 05/17/22 0544  Estimated creatinine clearance: 13 mL/min (A) N/a       Pharmacy has renally dose-adjusted the following medication(s):    Date Original Order Renally Adjusted Order   5/17/2022 Cefepime 1000mg q12h Cefepime 1000mg q24h       These changes were made per protocol according to the [facility] Renal Function-Based Dose Adjustments Policy    *Please note this dose may need readjusted if your patient's renal function significantly improves. Please contact pharmacy with any questions regarding these changes.     Carole uCrtis, [facility]  5/17/2022  8:16 AM

## 2022-05-18 LAB
ALBUMIN SERPL-MCNC: 3.3 G/DL (ref 3.5–5.2)
ALP BLD-CCNC: 86 U/L (ref 35–104)
ALT SERPL-CCNC: 16 U/L (ref 0–32)
ANION GAP SERPL CALCULATED.3IONS-SCNC: 11 MMOL/L (ref 7–16)
AST SERPL-CCNC: 21 U/L (ref 0–31)
BILIRUB SERPL-MCNC: 0.2 MG/DL (ref 0–1.2)
BUN BLDV-MCNC: 72 MG/DL (ref 6–23)
CALCIUM SERPL-MCNC: 9.5 MG/DL (ref 8.6–10.2)
CHLORIDE BLD-SCNC: 104 MMOL/L (ref 98–107)
CO2: 25 MMOL/L (ref 22–29)
CREAT SERPL-MCNC: 2.2 MG/DL (ref 0.5–1)
GFR AFRICAN AMERICAN: 25
GFR NON-AFRICAN AMERICAN: 21 ML/MIN/1.73
GLUCOSE BLD-MCNC: 89 MG/DL (ref 74–99)
HCT VFR BLD CALC: 30.1 % (ref 34–48)
HEMOGLOBIN: 9.5 G/DL (ref 11.5–15.5)
MCH RBC QN AUTO: 31.7 PG (ref 26–35)
MCHC RBC AUTO-ENTMCNC: 31.6 % (ref 32–34.5)
MCV RBC AUTO: 100.3 FL (ref 80–99.9)
PDW BLD-RTO: 14.5 FL (ref 11.5–15)
PLATELET # BLD: 200 E9/L (ref 130–450)
PMV BLD AUTO: 10.5 FL (ref 7–12)
POTASSIUM SERPL-SCNC: 5 MMOL/L (ref 3.5–5)
RBC # BLD: 3 E12/L (ref 3.5–5.5)
SODIUM BLD-SCNC: 140 MMOL/L (ref 132–146)
TOTAL PROTEIN: 6.3 G/DL (ref 6.4–8.3)
WBC # BLD: 5.5 E9/L (ref 4.5–11.5)

## 2022-05-18 PROCEDURE — 36415 COLL VENOUS BLD VENIPUNCTURE: CPT

## 2022-05-18 PROCEDURE — 2500000003 HC RX 250 WO HCPCS: Performed by: INTERNAL MEDICINE

## 2022-05-18 PROCEDURE — 2580000003 HC RX 258: Performed by: INTERNAL MEDICINE

## 2022-05-18 PROCEDURE — 6360000002 HC RX W HCPCS: Performed by: INTERNAL MEDICINE

## 2022-05-18 PROCEDURE — 1200000000 HC SEMI PRIVATE

## 2022-05-18 PROCEDURE — 6370000000 HC RX 637 (ALT 250 FOR IP): Performed by: INTERNAL MEDICINE

## 2022-05-18 PROCEDURE — 80053 COMPREHEN METABOLIC PANEL: CPT

## 2022-05-18 PROCEDURE — 85027 COMPLETE CBC AUTOMATED: CPT

## 2022-05-18 RX ADMIN — Medication 2000 UNITS: at 08:27

## 2022-05-18 RX ADMIN — SACUBITRIL AND VALSARTAN 1 TABLET: 24; 26 TABLET, FILM COATED ORAL at 20:56

## 2022-05-18 RX ADMIN — ATORVASTATIN CALCIUM 20 MG: 20 TABLET, FILM COATED ORAL at 20:56

## 2022-05-18 RX ADMIN — CEFEPIME HYDROCHLORIDE 1000 MG: 1 INJECTION, POWDER, FOR SOLUTION INTRAMUSCULAR; INTRAVENOUS at 08:49

## 2022-05-18 RX ADMIN — BUMETANIDE 1 MG: 0.25 INJECTION, SOLUTION INTRAMUSCULAR; INTRAVENOUS at 13:21

## 2022-05-18 RX ADMIN — CALCIUM CARBONATE-VITAMIN D TAB 500 MG-200 UNIT 1 TABLET: 500-200 TAB at 08:27

## 2022-05-18 RX ADMIN — SACUBITRIL AND VALSARTAN 1 TABLET: 24; 26 TABLET, FILM COATED ORAL at 13:21

## 2022-05-18 RX ADMIN — ACETAMINOPHEN 650 MG: 325 TABLET ORAL at 01:20

## 2022-05-18 RX ADMIN — GABAPENTIN 400 MG: 400 CAPSULE ORAL at 20:56

## 2022-05-18 RX ADMIN — ACETAMINOPHEN 650 MG: 325 TABLET ORAL at 21:22

## 2022-05-18 RX ADMIN — ASPIRIN 81 MG: 81 TABLET, COATED ORAL at 08:27

## 2022-05-18 RX ADMIN — ACETAMINOPHEN 650 MG: 325 TABLET ORAL at 13:20

## 2022-05-18 ASSESSMENT — PAIN SCALES - GENERAL
PAINLEVEL_OUTOF10: 0
PAINLEVEL_OUTOF10: 0
PAINLEVEL_OUTOF10: 7
PAINLEVEL_OUTOF10: 4
PAINLEVEL_OUTOF10: 4

## 2022-05-18 ASSESSMENT — ENCOUNTER SYMPTOMS
DIARRHEA: 0
SHORTNESS OF BREATH: 1
COUGH: 0

## 2022-05-18 ASSESSMENT — PAIN DESCRIPTION - ORIENTATION: ORIENTATION: RIGHT;LEFT

## 2022-05-18 ASSESSMENT — PAIN DESCRIPTION - LOCATION: LOCATION: LEG

## 2022-05-18 ASSESSMENT — PAIN DESCRIPTION - DESCRIPTORS: DESCRIPTORS: BURNING

## 2022-05-18 NOTE — PLAN OF CARE
Problem: Discharge Planning  Goal: Discharge to home or other facility with appropriate resources  5/18/2022 0017 by Roro Hartley RN  Outcome: Progressing     Problem: Safety - Adult  Goal: Free from fall injury  5/18/2022 0017 by Roro Hartley RN  Outcome: Progressing     Problem: ABCDS Injury Assessment  Goal: Absence of physical injury  5/18/2022 0017 by Roro Hartley RN  Outcome: Progressing     Problem: Pain  Goal: Verbalizes/displays adequate comfort level or baseline comfort level  5/18/2022 0017 by Roro Hartley RN  Outcome: Progressing     Problem: Skin/Tissue Integrity  Goal: Absence of new skin breakdown  Description: 1. Monitor for areas of redness and/or skin breakdown  2. Assess vascular access sites hourly  3. Every 4-6 hours minimum:  Change oxygen saturation probe site  4. Every 4-6 hours:  If on nasal continuous positive airway pressure, respiratory therapy assess nares and determine need for appliance change or resting period.   5/18/2022 0017 by Roro Hartley RN  Outcome: Progressing     Problem: Chronic Conditions and Co-morbidities  Goal: Patient's chronic conditions and co-morbidity symptoms are monitored and maintained or improved  5/18/2022 0017 by Roro Hartley RN  Outcome: Progressing

## 2022-05-18 NOTE — PROGRESS NOTES
Progress Note  Date:2022       Room:0339/0339-01  Patient Marshall Calderon     YOB: 1928     Age:89 y.o. Subjective    Subjective:  Symptoms:  Stable. She reports shortness of breath, malaise, weakness and anxiety. No cough, chest pain, headache, chest pressure or anorexia. Diet:  Adequate intake. Activity level: Impaired due to weakness. Pain:  She complains of pain that is mild. Review of Systems   Respiratory: Positive for shortness of breath. Negative for cough. Cardiovascular: Negative for chest pain. Gastrointestinal: Negative for anorexia. Neurological: Positive for weakness. Objective         Vitals Last 24 Hours:  TEMPERATURE:  Temp  Av.1 °F (36.7 °C)  Min: 97.7 °F (36.5 °C)  Max: 98.4 °F (36.9 °C)  RESPIRATIONS RANGE: Resp  Av  Min: 16  Max: 16  PULSE OXIMETRY RANGE: SpO2  Av %  Min: 97 %  Max: 99 %  PULSE RANGE: Pulse  Av.5  Min: 68  Max: 79  BLOOD PRESSURE RANGE: Systolic (87JJI), OUK:410 , Min:118 , TZY:655   ; Diastolic (11BTB), FTN:36, Min:50, Max:70    I/O (24Hr): Intake/Output Summary (Last 24 hours) at 2022 2256  Last data filed at 2022 1726  Gross per 24 hour   Intake 1230 ml   Output 300 ml   Net 930 ml     Objective:  General Appearance:  Comfortable. Vital signs: (most recent): Blood pressure 134/70, pulse 68, temperature 97.7 °F (36.5 °C), temperature source Oral, resp. rate 16, weight 138 lb (62.6 kg), SpO2 99 %. Vital signs are normal.    Output: Producing urine. HEENT: Normal HEENT exam.    Lungs:  Normal respiratory rate. She is not in respiratory distress. There are decreased breath sounds. Heart: Regular rhythm. S1 normal and S2 normal.    Abdomen: Abdomen is soft. Bowel sounds are normal.   There is no abdominal tenderness. Extremities: There is local swelling. Pulses: Distal pulses are intact. Neurological: Patient is alert and oriented to person, place and time. Normal strength. Pupils:  Pupils are equal, round, and reactive to light. Skin:  Warm and dry. There is ulceration. Labs/Imaging/Diagnostics    Labs:  CBC:  Recent Labs     05/17/22 0544   WBC 5.1   RBC 2.83*   HGB 9.0*   HCT 28.6*   .1*   RDW 14.6        CHEMISTRIES:  Recent Labs     05/17/22 0544      K 5.1*      CO2 24   BUN 76*   CREATININE 2.3*   GLUCOSE 88     PT/INR:No results for input(s): PROTIME, INR in the last 72 hours. APTT:No results for input(s): APTT in the last 72 hours. LIVER PROFILE:  Recent Labs     05/17/22 0544   AST 21   ALT 15   BILITOT <0.2   ALKPHOS 82       Imaging Last 24 Hours:  Echo Complete    Result Date: 5/17/2022  Transthoracic Echocardiography Report (TTE)  Demographics   Patient Name      Jarad Sanchez Gender              Female                    M   Medical Record    [id number]          Room Number         5024  Number   Account #         [de-identified]         Procedure Date      05/17/2022   Corporate ID                        Ordering Physician  Valeria Machado   Accession Number  [id number]        Referring Physician   Date of Birth     09/03/1928        Sonographer         Darby Mckinnon                                                          [facility]   Age               80 year(s)        Interpreting        Dariela Llanes                                      Physician           MD                                       Any Other  Procedure Type of Study   TTE procedure:Echo Complete W/Doppler & Color Flow. Procedure Date Date: 05/17/2022 Start: 02:28 PM Study Location: Echo Lab Technical Quality: Adequate visualization Indications:Congestive heart failure. Patient Status: Routine Height: 62 inches Weight: 138 pounds BSA: 1.63 m^2 BMI: 25.24 kg/m^2 BP: 118/50 mmHg  Findings   Left Ventricle  Left ventricular size is grossly normal.  Normal left ventricular wall thickness. Ejection fraction is visually estimated at 77%.   Overall ----------------------------------------------------------------  M-Mode/2D Measurements & Calculations   LV Diastolic    LV Systolic Dimension: 2.6   AV Cusp Separation: 1.8 cmLA  Dimension: 4.8  cm                           Dimension: 4.2 cmAO Root  cm              LV Volume Diastolic: 653.8   Dimension: 3 cm  LV FS:45.8 %    ml  LV PW           LV Volume Systolic: 75.2 ml  Diastolic: 1.1  LV EDV/LV EDV Index: 106.4  cm              ml/65 ml/m^2LV ESV/LV ESV    RV Diastolic Dimension: 3.2  LV PW Systolic: Index: 73.2 TG/36WG/ m^2     cm  1.6 cm          EF Calculated: 77.3 %        RV Systolic Dimension: 2.6 cm  Septum          LV Mass Index: 119 l/min*m^2 LA/Aorta: 1.03  Diastolic: 1.1  cm                                           LA volume/Index: 80.2 ml  Septum          LVOT: 2 cm  Systolic: 1.5  cm   LV Mass: 193.96  g  Doppler Measurements & Calculations   MV Peak E-Wave:   AV Peak Velocity: 1.98 m/s    LVOT Peak Velocity: 1.37  0.84 m/s          AV Peak Gradient: 15.6 mmHg   m/s  MV Peak A-Wave:   AV Mean Velocity: 1.32 m/s    LVOT Mean Velocity: 0.97  0.98 m/s          AV Mean Gradient: 7.8 mmHg    m/s  MV E/A Ratio:     AV VTI: 46.6 cm               LVOT Peak Gradient: 7.6  0.85              AV Area (Continuity):2.47     mmHgLVOT Mean Gradient:  MV Peak Gradient: cm^2                          4.1 mmHg  5.3 mmHg  MV Mean Gradient: LVOT VTI: 36.6 cm  2.1 mmHg  MV Mean Velocity:                               TR Velocity:2.86 m/s  0.68 m/s          Pulm. Vein A Reversal         TR Gradient:32.65 mmHg  MV Deceleration   Duration:193.8 msec           PV Peak Velocity: 0.98 m/s  Time: 189.4 msec  Pulm. Vein D Velocity:0.33    PV Peak Gradient: 3.8 mmHg  MV P1/2t: 91.2    m/sPulm. Vein A Reversal      PV Mean Velocity: 0.71 m/s  msec              Velocity:0.26 m/s             PV Mean Gradient: 2.2 mmHg  MVA by PHT:2.41   Pulm.  Vein S Velocity: 0.46  cm^2              m/s  MV Area  (continuity): 2.5  cm^2 [email or web address] 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 ([address] 5/16/2022 Yes        Assessment:  (Problem List as of 5/17/2022 Reviewed: 12/6/2019  1:01 PM by Malu Gonzales MD    * (Principal) Cellulitis of lower extremity    Hyperlipidemia    Chronic back pain    Non-healing ulcer ([address]    CKD (chronic kidney disease) stage 3, GFR 30-59 ml/min (HCC)    Chronic diastolic heart failure (HCC)    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 Plan:   (Cont with abx and iv bumes and entressto).        Electronically signed by Pedro Huynh MD on 5/17/22 at 10:56 PM EDT

## 2022-05-18 NOTE — PLAN OF CARE
Problem: Discharge Planning  Goal: Discharge to home or other facility with appropriate resources  Outcome: Progressing     Problem: Safety - Adult  Goal: Free from fall injury  Outcome: Progressing     Problem: ABCDS Injury Assessment  Goal: Absence of physical injury  Outcome: Progressing     Problem: Pain  Goal: Verbalizes/displays adequate comfort level or baseline comfort level  Outcome: Progressing     Problem: Skin/Tissue Integrity  Goal: Absence of new skin breakdown  Outcome: Progressing     Problem: Chronic Conditions and Co-morbidities  Goal: Patient's chronic conditions and co-morbidity symptoms are monitored and maintained or improved  Outcome: Progressing

## 2022-05-18 NOTE — CARE COORDINATION
CM note: Met with patient for transition of care planning. Pt was sitting up in the chair, alert, oriented and pleasant. Pt states her plan is to return home at discharge. She lives in a ranch home, with 3 steps to enter with a railing. She uses a cane or a wheeled walker to ambulate at baseline. Independent with ADLs, son and DIL does her laundry and grocery shopping and gets her to appointments. Verified PCP and pharmacy preference with patient. Patient is currently receiving IV antibiotics for cellulitis, ID not consulted so anticipate that she will discharge on oral antibiotics. Nursing should teach family every other day dressing changes.

## 2022-05-19 LAB
ALBUMIN SERPL-MCNC: 3.2 G/DL (ref 3.5–5.2)
ALP BLD-CCNC: 82 U/L (ref 35–104)
ALT SERPL-CCNC: 15 U/L (ref 0–32)
ANION GAP SERPL CALCULATED.3IONS-SCNC: 11 MMOL/L (ref 7–16)
AST SERPL-CCNC: 20 U/L (ref 0–31)
BILIRUB SERPL-MCNC: 0.2 MG/DL (ref 0–1.2)
BUN BLDV-MCNC: 76 MG/DL (ref 6–23)
CALCIUM SERPL-MCNC: 9.4 MG/DL (ref 8.6–10.2)
CHLORIDE BLD-SCNC: 101 MMOL/L (ref 98–107)
CO2: 26 MMOL/L (ref 22–29)
CREAT SERPL-MCNC: 2.1 MG/DL (ref 0.5–1)
GFR AFRICAN AMERICAN: 27
GFR NON-AFRICAN AMERICAN: 22 ML/MIN/1.73
GLUCOSE BLD-MCNC: 92 MG/DL (ref 74–99)
HCT VFR BLD CALC: 29.5 % (ref 34–48)
HEMOGLOBIN: 9.3 G/DL (ref 11.5–15.5)
MCH RBC QN AUTO: 31.2 PG (ref 26–35)
MCHC RBC AUTO-ENTMCNC: 31.5 % (ref 32–34.5)
MCV RBC AUTO: 99 FL (ref 80–99.9)
ORGANISM: ABNORMAL
PDW BLD-RTO: 14.5 FL (ref 11.5–15)
PLATELET # BLD: 190 E9/L (ref 130–450)
PMV BLD AUTO: 10.8 FL (ref 7–12)
POTASSIUM SERPL-SCNC: 4.7 MMOL/L (ref 3.5–5)
RBC # BLD: 2.98 E12/L (ref 3.5–5.5)
SODIUM BLD-SCNC: 138 MMOL/L (ref 132–146)
TOTAL PROTEIN: 6 G/DL (ref 6.4–8.3)
WBC # BLD: 5.6 E9/L (ref 4.5–11.5)
WOUND/ABSCESS: ABNORMAL

## 2022-05-19 PROCEDURE — 80053 COMPREHEN METABOLIC PANEL: CPT

## 2022-05-19 PROCEDURE — 85027 COMPLETE CBC AUTOMATED: CPT

## 2022-05-19 PROCEDURE — 97116 GAIT TRAINING THERAPY: CPT

## 2022-05-19 PROCEDURE — 97110 THERAPEUTIC EXERCISES: CPT

## 2022-05-19 PROCEDURE — 36415 COLL VENOUS BLD VENIPUNCTURE: CPT

## 2022-05-19 PROCEDURE — 97535 SELF CARE MNGMENT TRAINING: CPT

## 2022-05-19 PROCEDURE — 6370000000 HC RX 637 (ALT 250 FOR IP): Performed by: INTERNAL MEDICINE

## 2022-05-19 PROCEDURE — 1200000000 HC SEMI PRIVATE

## 2022-05-19 PROCEDURE — 2500000003 HC RX 250 WO HCPCS: Performed by: INTERNAL MEDICINE

## 2022-05-19 PROCEDURE — 6360000002 HC RX W HCPCS: Performed by: INTERNAL MEDICINE

## 2022-05-19 PROCEDURE — 2580000003 HC RX 258: Performed by: INTERNAL MEDICINE

## 2022-05-19 RX ADMIN — SACUBITRIL AND VALSARTAN 1 TABLET: 24; 26 TABLET, FILM COATED ORAL at 20:04

## 2022-05-19 RX ADMIN — Medication 2000 UNITS: at 08:11

## 2022-05-19 RX ADMIN — ATORVASTATIN CALCIUM 20 MG: 20 TABLET, FILM COATED ORAL at 20:04

## 2022-05-19 RX ADMIN — CEFEPIME HYDROCHLORIDE 1000 MG: 1 INJECTION, POWDER, FOR SOLUTION INTRAMUSCULAR; INTRAVENOUS at 08:16

## 2022-05-19 RX ADMIN — BUMETANIDE 1 MG: 0.25 INJECTION, SOLUTION INTRAMUSCULAR; INTRAVENOUS at 09:25

## 2022-05-19 RX ADMIN — CALCIUM CARBONATE-VITAMIN D TAB 500 MG-200 UNIT 1 TABLET: 500-200 TAB at 08:12

## 2022-05-19 RX ADMIN — VANCOMYCIN HYDROCHLORIDE 1000 MG: 1 INJECTION, POWDER, LYOPHILIZED, FOR SOLUTION INTRAVENOUS at 09:31

## 2022-05-19 RX ADMIN — ASPIRIN 81 MG: 81 TABLET, COATED ORAL at 08:11

## 2022-05-19 RX ADMIN — GABAPENTIN 400 MG: 400 CAPSULE ORAL at 20:04

## 2022-05-19 RX ADMIN — SACUBITRIL AND VALSARTAN 1 TABLET: 24; 26 TABLET, FILM COATED ORAL at 09:24

## 2022-05-19 RX ADMIN — ACETAMINOPHEN 650 MG: 325 TABLET ORAL at 05:00

## 2022-05-19 ASSESSMENT — ENCOUNTER SYMPTOMS
COUGH: 0
DIARRHEA: 0
SHORTNESS OF BREATH: 1

## 2022-05-19 ASSESSMENT — PAIN SCALES - GENERAL: PAINLEVEL_OUTOF10: 5

## 2022-05-19 NOTE — PROGRESS NOTES
Progress Note  Date:2022       Room:0339/0339-01  Patient Silverio King     YOB: 1928     Age:89 y.o. Subjective    Subjective:  Symptoms:  Stable. She reports shortness of breath, malaise, weakness and anxiety. No cough, chest pain, headache, chest pressure, anorexia or diarrhea. Diet:  Adequate intake. Activity level: Impaired due to weakness. Pain:  She complains of pain that is moderate. Review of Systems   Respiratory: Positive for shortness of breath. Negative for cough. Cardiovascular: Negative for chest pain. Gastrointestinal: Negative for anorexia and diarrhea. Neurological: Positive for weakness. Objective         Vitals Last 24 Hours:  TEMPERATURE:  Temp  Av.1 °F (36.7 °C)  Min: 97.8 °F (36.6 °C)  Max: 98.3 °F (36.8 °C)  RESPIRATIONS RANGE: Resp  Av  Min: 18  Max: 18  PULSE OXIMETRY RANGE: SpO2  Av %  Min: 94 %  Max: 96 %  PULSE RANGE: Pulse  Av.5  Min: 71  Max: 80  BLOOD PRESSURE RANGE: Systolic (20YRD), ADW:949 , Min:123 , HFB:441   ; Diastolic (84OOL), HIZ:05, Min:50, Max:56    I/O (24Hr): Intake/Output Summary (Last 24 hours) at 2022 2253  Last data filed at 2022 1712  Gross per 24 hour   Intake 720 ml   Output --   Net 720 ml     Objective:  General Appearance:  Comfortable. Vital signs: (most recent): Blood pressure (!) 127/50, pulse 71, temperature 97.8 °F (36.6 °C), temperature source Oral, resp. rate 18, height 5' 2\" (1.575 m), weight 138 lb (62.6 kg), SpO2 94 %. Vital signs are normal.    Output: Producing urine. HEENT: Normal HEENT exam.    Lungs:  She is in respiratory distress. No decreased breath sounds. Heart: Regular rhythm. S1 normal and S2 normal.    Abdomen: Abdomen is soft. Bowel sounds are normal.     Extremities: Normal range of motion. There is local swelling. Pulses: Distal pulses are intact. Neurological: Patient is alert and oriented to person, place and time. Pupils:  Pupils are equal, round, and reactive to light. Skin:  Warm and dry. Labs/Imaging/Diagnostics    Labs:  CBC:  Recent Labs     05/17/22 0544 05/18/22 0544   WBC 5.1 5.5   RBC 2.83* 3.00*   HGB 9.0* 9.5*   HCT 28.6* 30.1*   .1* 100.3*   RDW 14.6 14.5    200     CHEMISTRIES:  Recent Labs     05/17/22 0544 05/18/22 0544    140   K 5.1* 5.0    104   CO2 24 25   BUN 76* 72*   CREATININE 2.3* 2.2*   GLUCOSE 88 89     PT/INR:No results for input(s): PROTIME, INR in the last 72 hours. APTT:No results for input(s): APTT in the last 72 hours. LIVER PROFILE:  Recent Labs     05/17/22 0544 05/18/22 0544   AST 21 21   ALT 15 16   BILITOT <0.2 0.2   ALKPHOS 82 86       Imaging Last 24 Hours:  Echo Complete    Result Date: 5/17/2022  Transthoracic Echocardiography Report (TTE)  Demographics   Patient Name      Sung Pedraza Gender              Female                    M   Medical Record    [id number]          Room Number         2189  Number   Account #         [de-identified]         Procedure Date      05/17/2022   Corporate ID                        Ordering Physician  Pedro Huynh   Accession Number  [id number]        Referring Physician   Date of Birth     09/03/1928        Sonographer         Samira Rodriguez RDCS   Age               80 year(s)        Interpreting        Montse Morrow MD                                       Any Other  Procedure Type of Study   TTE procedure:Echo Complete W/Doppler & Color Flow. Procedure Date Date: 05/17/2022 Start: 02:28 PM Study Location: Echo Lab Technical Quality: Adequate visualization Indications:Congestive heart failure.  Patient Status: Routine Height: 62 inches Weight: 138 pounds BSA: 1.63 m^2 BMI: 25.24 kg/m^2 BP: 118/50 mmHg  Findings   Left Ventricle  Left ventricular size is grossly normal.  Normal left ventricular wall thickness. Ejection fraction is visually estimated at 77%. Overall ejection fraction is normal .  E/A flow reversal noted. Suggestive of diastolic dysfunction. Right Ventricle  Borderline dilated right ventricle. Right ventricle global systolic function is normal .   Left Atrium  The left atrium is mildly dilated. Interatrial septum appears intact. Right Atrium  Mildly enlarged right atrium size. Mitral Valve  Structurally normal mitral valve. No mitral valve stenosis present. Mild to moderate mitral regurgitation is present. Tricuspid Valve  The tricuspid valve appears structurally normal.  No evidence of tricuspid stenosis. Mild tricuspid regurgitation. RVSP is 37 mmHg. Aortic Valve  Structurally normal aortic valve. Aortic valve opens well. The aortic valve is trileaflet. No evidence of aortic valve regurgitation. No hemodynamically significant aortic stenosis is present. Pulmonic Valve  Pulmonic valve is structurally normal. No evidence of any pulmonic  regurgitation. No evidence of pulmonic valve stenosis. There is mild  pulmonary hypertension. Pericardial Effusion  No evidence for hemodynamically significant pericardial effusion. Pleural Effusion  No evidence of pleural effusion. Aorta  Aortic root dimension within normal limits. The Pulmonary artery is within normal limits. Miscellaneous  Normal Inferior Vena Cava diameter. Conclusions   Summary  Ejection fraction is visually estimated at 77%. Overall ejection fraction is normal .  E/A flow reversal noted. Suggestive of diastolic dysfunction. The left atrium is mildly dilated. Borderline dilated right ventricle. Mildly enlarged right atrium size. Mild to moderate mitral regurgitation is present. Mild tricuspid regurgitation. There is mild pulmonary hypertension.    Signature   ----------------------------------------------------------------  Electronically signed by Dian Lozano MD(Interpreting physician) on 05/17/2022 10:40 PM  ----------------------------------------------------------------  M-Mode/2D Measurements & Calculations   LV Diastolic    LV Systolic Dimension: 2.6   AV Cusp Separation: 1.8 cmLA  Dimension: 4.8  cm                           Dimension: 4.2 cmAO Root  cm              LV Volume Diastolic: 153.2   Dimension: 3 cm  LV FS:45.8 %    ml  LV PW           LV Volume Systolic: 53.9 ml  Diastolic: 1.1  LV EDV/LV EDV Index: 106.4  cm              ml/65 ml/m^2LV ESV/LV ESV    RV Diastolic Dimension: 3.2  LV PW Systolic: Index: 89.8 MX/06ZZ/ m^2     cm  1.6 cm          EF Calculated: 77.3 %        RV Systolic Dimension: 2.6 cm  Septum          LV Mass Index: 119 l/min*m^2 LA/Aorta: 7.62  Diastolic: 1.1  cm                                           LA volume/Index: 80.2 ml  Septum          LVOT: 2 cm  Systolic: 1.5  cm   LV Mass: 193.96  g  Doppler Measurements & Calculations   MV Peak E-Wave:   AV Peak Velocity: 1.98 m/s    LVOT Peak Velocity: 1.37  0.84 m/s          AV Peak Gradient: 15.6 mmHg   m/s  MV Peak A-Wave:   AV Mean Velocity: 1.32 m/s    LVOT Mean Velocity: 0.97  0.98 m/s          AV Mean Gradient: 7.8 mmHg    m/s  MV E/A Ratio:     AV VTI: 46.6 cm               LVOT Peak Gradient: 7.6  0.85              AV Area (Continuity):2.47     mmHgLVOT Mean Gradient:  MV Peak Gradient: cm^2                          4.1 mmHg  5.3 mmHg  MV Mean Gradient: LVOT VTI: 36.6 cm  2.1 mmHg  MV Mean Velocity:                               TR Velocity:2.86 m/s  0.68 m/s          Pulm. Vein A Reversal         TR Gradient:32.65 mmHg  MV Deceleration   Duration:193.8 msec           PV Peak Velocity: 0.98 m/s  Time: 189.4 msec  Pulm. Vein D Velocity:0.33    PV Peak Gradient: 3.8 mmHg  MV P1/2t: 91.2    m/sPulm. Vein A Reversal      PV Mean Velocity: 0.71 m/s  msec              Velocity:0.26 m/s             PV Mean Gradient: 2.2 mmHg  MVA by PHT:2.41   Pulm.  Vein S Velocity: 0.46  cm^2              m/s  MV Area (continuity): 2.5  cm^2  [email or web address] 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 ([address] 5/16/2022 Yes        Assessment:  (Problem List as of 5/18/2022 Reviewed: 12/6/2019  1:01 PM by Anya Fields MD    * (Principal) Cellulitis of lower extremity    Hyperlipidemia    Chronic back pain    Non-healing ulcer ([address]    CKD (chronic kidney disease) stage 3, GFR 30-59 ml/min (HCC)    Chronic diastolic heart failure (HCC)    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 Plan:   (Wound is growing staph. Cont with bumex and entressto).        Electronically signed by Quinn Gonzalez MD on 5/18/22 at 10:53 PM EDT

## 2022-05-19 NOTE — PROGRESS NOTES
Physical Therapy Initial Evaluation/Plan of Care    Room #:  6458/4277-45  Patient Name: Samuel Wolf  YOB: 1928  MRN: [id number]    Date of Service: 5/19/2022     Tentative placement recommendation: Home Health Physical Therapy   Equipment recommendation: Patient has needed equipment       Evaluating Physical Therapist: Melvin Miranda #[id number]      Specific Provider Orders/Date/Referring Provider :  05/17/22 0845   PT eval and treat Start: 05/17/22 0845, End: 05/17/22 0845, ONE TIME, Standing Count: 1 Occurrences, R    Stevie Bob MD      Admitting Diagnosis:   Cellulitis of lower extremity, unspecified laterality [O96.600]     non healing ulcers of left leg and it was healing and suddenly got worse. Her legs are swelling with fluid oozing fluid out. 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 Patient able to increase gait distance today with min A and wheeled walker, cues for step length and pacing. Patient requires skilled physical therapy to address concerns listed above to increase safety and independence at discharge.          PHYSICAL THERAPY  PLAN OF CARE       Physical therapy plan of care is established based on physician order,  patient diagnosis and clinical assessment    Current Treatment Recommendations:    -Bed Mobility: Lower extremity exercises   -Sitting Balance: Incorporate reaching activities to activate trunk muscles   -Standing Balance: Perform strengthening exercises in standing to promote motor control with or without upper extremity support   -Transfers: Provide instruction on proper hand and foot position for adequate transfer of weight onto lower extremities and use of gait device if needed and Cues for hand placement, technique and safety. Provide stabilization to prevent fall   -Gait: Gait training, Standing activities to improve: base of support, weight shift, weight bearing  and Pregait training to emphasize: Increased step length , Upright and Safety   -Endurance: Utilize Supervised activities to increase level of endurance to allow for safe functional mobility including transfers and gait   -Stairs: Stair training with instruction on proper technique and hand placement on rail    PT long term treatment goals are located in below grid    Patient and or family understand(s) diagnosis, prognosis, and plan of care. Frequency of treatments: Patient will be seen  daily.          Prior Level of Function: Patient ambulated with wheeled walker    Rehab Potential: good    for baseline    Past medical history:   Past Medical History:   Diagnosis Date    [facility]     Cancer ([facility]     melonoma - back; left breast CA    Chronic back pain     CKD (chronic kidney disease) stage 3, GFR 30-59 ml/min ([facility])     Hyperlipidemia     Hypertension     Macular degeneration     Osteoarthritis     Restless legs syndrome      Past Surgical History:   Procedure Laterality Date    BREAST LUMPECTOMY      BREAST SURGERY Left 6/3/15    lumpectomy    CATARACT REMOVAL WITH IMPLANT Right 3 8 16    COLONOSCOPY  5/27/15    EYE SURGERY Right 1/28/2022    PARS PLANA VITRECTOMY, ENDO LASER, GAS FLUID EXCHANGE, 25 G, MAC, RIGHT EYE performed by Yolanda Schmitt MD at [address] [place] [address] HYSTERECTOMY      with uterus suspension.  MOHS SURGERY         SUBJECTIVE:    Precautions:  , falls ,    Social history: Patient lives alone in a ranch home  with 3 steps  to enter with [address] bathes     Equipment owned: Wheelchair, Cane and [address],       [address] [facility]   How much difficulty turning over in bed?: A Little  How much difficulty sitting down on / standing up from a chair with arms?: A Little  How much difficulty moving from lying on back to sitting on side of bed?: A Little  How much help from another person moving to and from a bed to a chair?: A Little  How much help from another person needed to walk in hospital room?: A Little  How much help from another person for climbing 3-5 steps with a railing?: A Lot  AM-PAC Inpatient Mobility Raw Score : 17  AM-PAC Inpatient T-Scale Score : 42.13  Mobility Inpatient CMS 0-100% Score: 50.57  Mobility Inpatient CMS G-Code Modifier : CK    Nursing cleared patient for PT treatment. in chair at start of session. OBJECTIVE;   Initial Evaluation  Date: 5/17/2022 Treatment Date:    5/19/2022   Short Term/ Long Term   Goals   Was pt agreeable to Eval/treatment? Yes Yes To be met in 3 days   Pain level   5/10  Bilateral ankles 5/10  ankles    Bed Mobility    Rolling: Minimal assist of 1    Supine to sit: Minimal assist of 1    Sit to supine: Minimal assist of 1    Scooting: Minimal assist of 1   Rolling: Not assessed patient in chair   Supine to sit: Not assessed patient in chair   Sit to supine: Not assessed patient in chair   Scooting: Not assessed patient in chair    Rolling: Independent    Supine to sit:  Independent    Sit to supine: Independent    Scooting: Independent     Transfers Sit to stand: Minimal assist of 1 Cues for hand placement and safety  Sit to stand: Minimal assist of 1 cues for hand placement      Sit to stand: Independent     Ambulation    1 x 15 feet 1 x 10 feet using  wheeled walker with Minimal assist of 1   for walker control and cues for upright posture, increased step length and safety 2x40 feet using  wheeled walker with Minimal assist of 1   cues for upright posture, safety, proper hand placement and pacing    100 feet using  wheeled walker with Modified Independent    Stair negotiation: ascended and descended   Not assessed  Not assessed      3 steps 1 rail with supervision    ROM Within functional limits    Increase range of motion 10% of affected joints    Strength BUE:  refer to OT eval  RLE:  3+/5  LLE:  3+/5  Increase strength in affected mm groups by 1/3 grade   Balance Sitting EOB:  good    Dynamic Standing:  fair + wheeled walker  Sitting EOB: not assessed   Dynamic Standing: fair + wheeled walker     Sitting EOB:  good    Dynamic Standing: good wheeled walker      Patient is Alert & Oriented x person, place, time and situation and follows directions    Sensation:  Patient  reports numbness/tingling bilateral upper extremities     Edema:  yes bilateral lower extremities   Endurance: fair  +    Vitals: room air   Blood Pressure at rest  Blood Pressure during session    Heart Rate at rest   Heart Rate during session     SPO2 at rest 97%  SPO2 during session 94-98%     Patient education  Patient educated on role of Physical Therapy, risks of immobility, safety and plan of care,  importance of mobility while in hospital  and ankle pumps, quad set and glut set for edema control, blood clot prevention     Patient response to education:   Pt verbalized understanding Pt demonstrated skill Pt requires further education in this area   Yes Partial Yes      Treatment:  Patient practiced and was instructed/facilitated in the following treatment: Patient  in chair. Pt stood and ambulated x 2 reps, rest break in between. Pt performed exercises. Therapeutic Exercises:  ankle pumps, heel raises, glut sets, long arc quad and seated marching  2 x 15 reps.      At end of session, patient in chair  , nursing notified. Patient would benefit from continued skilled Physical Therapy to improve functional independence and quality of life.          Patient's/ family goals   home    Time in  1132  Time out  1155    Total Treatment Time  23 minutes    CPT codes:  Therapeutic exercises (24233)   13 minutes  1 unit(s)  Gait Training (23682) 10 minutes 1 unit(s)    Melvin Betts #[id number]

## 2022-05-19 NOTE — PLAN OF CARE
Problem: Discharge Planning  Goal: Discharge to home or other facility with appropriate resources  5/19/2022 1107 by Sarita Portillo RN  Outcome: Progressing  5/18/2022 2148 by Ariana Winters RN  Outcome: Progressing     Problem: Safety - Adult  Goal: Free from fall injury  5/19/2022 1107 by Sarita Portillo RN  Outcome: Progressing  5/18/2022 2148 by Ariana Winters RN  Outcome: Progressing     Problem: ABCDS Injury Assessment  Goal: Absence of physical injury  5/19/2022 1107 by Sarita Portillo RN  Outcome: Progressing  5/18/2022 2148 by Ariana Winters RN  Outcome: Progressing     Problem: Pain  Goal: Verbalizes/displays adequate comfort level or baseline comfort level  5/19/2022 1107 by Sarita Portillo RN  Outcome: Progressing  5/18/2022 2148 by Ariana Winters RN  Outcome: Progressing     Problem: Skin/Tissue Integrity  Goal: Absence of new skin breakdown  Description: 1. Monitor for areas of redness and/or skin breakdown  2. Assess vascular access sites hourly  3. Every 4-6 hours minimum:  Change oxygen saturation probe site  4. Every 4-6 hours:  If on nasal continuous positive airway pressure, respiratory therapy assess nares and determine need for appliance change or resting period.   5/19/2022 1107 by Sarita Portillo RN  Outcome: Progressing  5/18/2022 2148 by Ariana Winters RN  Outcome: Progressing     Problem: Chronic Conditions and Co-morbidities  Goal: Patient's chronic conditions and co-morbidity symptoms are monitored and maintained or improved  5/19/2022 1107 by Sarita Portillo RN  Outcome: Progressing  5/18/2022 2148 by Ariana Winters RN  Outcome: Progressing

## 2022-05-19 NOTE — FLOWSHEET NOTE
Inpatient Wound Care    Admit Date: 5/16/2022  4:44 PM    Reason for consult:  Left leg    Significant history:    Past Medical History:   Diagnosis Date    [facility]     Cancer ([facility]     melonoma - back; left breast CA    Chronic back pain     CKD (chronic kidney disease) stage 3, GFR 30-59 ml/min ([facility])     Hyperlipidemia     Hypertension     Macular degeneration     Osteoarthritis     Restless legs syndrome        Wound history:      Findings:       05/18/22 1119   Wound 05/16/22 Leg Left; Lower; Posterior   Date First Assessed/Time First Assessed: 05/16/22 1815   Present on Hospital Admission: Yes  Location: Leg  Wound Location Orientation: Left; Lower; Posterior   Dressing Status New dressing applied;Clean;Dry; Intact   Wound Cleansed Cleansed with saline   Dressing/Treatment Ace wrap;Dry dressing  (Xeroform)   Wound Length (cm) 4 cm   Wound Width (cm) 3 cm   Wound Depth (cm) 0.2 cm   Wound Surface Area (cm^2) 12 cm^2   Change in Wound Size % (l*w) 0   Wound Volume (cm^3) 2.4 cm^3   Wound Healing % 0   Wound Assessment Dry;Pink/red   Drainage Amount Scant   Drainage Description Thin   Odor None   Brielle-wound Assessment Intact; Warm;Edematous   Margins Defined edges       Impression:  Full thickness wound    Interventions in place:  Xeroform dressing    Plan:  Cont with xeroform dressing      Dimitry Orr RN 5/19/2022 2:43 PM

## 2022-05-19 NOTE — CARE COORDINATION
CM note: wound is growing mrsa, pt on iv antibiotics here however anticipate patient may discharge on oral antibiotics (? linezolid which may require prior authorization). Pt states she will return home at discharge and family will provide transportation and assistance, should be shown wound care.

## 2022-05-19 NOTE — PROGRESS NOTES
Pharmacy Consultation Note  (Antibiotic Dosing and Monitoring)    Initial consult date: 5/19/22  Consulting physician/provider: Dr. Tish Mei  Drug: Vancomycin  Indication: SSTI    Age/  Gender Height Weight IBW  Allergy Information   89 y.o./female 5' 2\" (157.5 cm) 138 lb (62.6 kg)     Ideal body weight: 50.1 kg (110 lb 7.2 oz)  Adjusted ideal body weight: 55.1 kg (121 lb 7.5 oz)   Flagyl [metronidazole], Adhesive tape, and Penicillin g      Renal Function:  Recent Labs     05/17/22  0544 05/18/22  0544 05/19/22  0514   BUN 76* 72* 76*   CREATININE 2.3* 2.2* 2.1*       Intake/Output Summary (Last 24 hours) at 5/19/2022 1152  Last data filed at 5/19/2022 0933  Gross per 24 hour   Intake 840 ml   Output --   Net 840 ml       Vancomycin Monitoring:  Trough:  No results for input(s): VANCOTROUGH in the last 72 hours. Random:  No results for input(s): VANCORANDOM in the last 72 hours. Vancomycin Administration Times:  Recent vancomycin administrations                   vancomycin (VANCOCIN) 1,000 mg in dextrose 5 % 250 mL IVPB (mg) 1,000 mg New Bag 05/19/22 0931                Assessment:  · Patient is a 80 y.o. female who has been initiated on vancomycin for SSTI. · Estimated Creatinine Clearance: 15 mL/min (A) (based on SCr of 2.1 mg/dL (H)). Patient's baseline SCr is ~1.3-1.5.   · To dose vancomycin, pharmacy will be utilizing dosing based off of levels because of patient's renal impairment/insufficiency   · 5/19: Received Vancomycin 1,000 mg x 1 this morning    Plan:  · Random level tomorrow with morning labs, re-dose if level is less than 15-20 mcg/mL  · Will continue to monitor renal function   · Clinical pharmacy to follow      Keshav Birch PharmD, BCPS 5/19/2022 11:52 AM   [phone]

## 2022-05-19 NOTE — PROGRESS NOTES
Physician Progress Note      PATIENT:               Javier Fenton  CSN #:                  [id number]  :                       9/3/1928  ADMIT DATE:       2022 4:44 PM  [address] [place] DATE:  RESPONDING  PROVIDER #:        Souleymane Shoemaker MD          QUERY TEXT:    Dear Dr. Jackie Tabor,    Pt admitted with swelling and oozing fluid. Pt noted to have CHF. If possible,   please document in progress notes and discharge summary the present on   admission status of CHF:    The medical record reflects the following:  Risk Factors: chronic diastolic CHF, CKD, HTN, HLD  Clinical Indicators: Echo done:  EF 77% with E/A flow reversal noted. Suggestive of diastolic dysfunction. ; per H&P: \"Chronic diastolic heart   failure; per pn : \"  acute on chronic diastolic heart failure\"  Treatment: IV Bumex started , entressto, echo, I&O, daily wt    Thank You,  Vance Matos RN, BSN, CDS  Clinical Documentation Improvement Specialist  [phone]  Options provided:  -- Yes, Diastolic CHF was present at the time of the order to admit to the   hospital  -- No, Diastolic CHF was not present on admission and developed during the   inpatient stay  -- Other - I will add my own diagnosis  -- Disagree - Not applicable / Not valid  -- Disagree - Clinically unable to determine / Unknown  -- Refer to Clinical Documentation Reviewer    PROVIDER RESPONSE TEXT:    Yes, diastolic CHF was present at the time of the order to admit to the   hospital.    Query created by: Janae Snyder on 2022 4:03 PM      Electronically signed by:  Souleymane Shoemaker MD 2022 10:44 PM

## 2022-05-19 NOTE — PROGRESS NOTES
OCCUPATIONAL THERAPY TREATMENT NOTE     [facility]  [facility] [place] [place]         Date:2022  Patient Name: Francis Werner  MRN: [id number]  : 9/3/1928  Room: [facility]9-            Evaluating OT: Pratibha Leola OTR/L; [id number]      Referring Provider and Specific Provider Orders/Date:      22   OT eval and treat  Start:  22,   End:  22, Khang Galvan TIME,   Standing Count:  1 Occurrences,   R         Felix Higuera MD      Placement Recommendation: HHOT         Diagnosis:   No diagnosis found. Surgery: none        Pertinent Medical History:       Past Medical History        Past Medical History:   Diagnosis Date    [facility]      Cancer [facility]       melonoma - back; left breast CA    Chronic back pain      CKD (chronic kidney disease) stage 3, GFR 30-59 ml/min ([facility])      Hyperlipidemia      Hypertension      Macular degeneration      Osteoarthritis      Restless legs syndrome              Past Surgical History         Past Surgical History:   Procedure Laterality Date    BREAST LUMPECTOMY        BREAST SURGERY Left 6/3/15     lumpectomy    CATARACT REMOVAL WITH IMPLANT Right 3 8 16    COLONOSCOPY   5/27/15    EYE SURGERY Right 2022     PARS PLANA VITRECTOMY, ENDO LASER, GAS FLUID EXCHANGE, 25 G, MAC, RIGHT EYE performed by Enrique Calvin MD at [facility] with uterus suspension.  MOHS SURGERY              Precautions:  Fall Risk, LE cellulitis       Assessment of current deficits    [x]? Functional mobility            [x]?ADLs           [x]? Strength                   []?Cognition    [x]? Functional transfers          [x]? IADLs         []? Safety Awareness   [x]? Endurance    []? Fine Coordination              [x]? Balance      []? Vision/perception    []? Sensation      []? Gross Motor Coordination  []? ROM           []?  Delirium                   []? Motor Control      OT PLAN OF CARE   OT POC based on physician orders, patient diagnosis and results of clinical assessment     Frequency/Duration 1-3 days/wk for 2 weeks PRN      Specific OT Treatment Interventions to include:   * Instruction/training on adapted ADL techniques and AE recommendations to increase functional independence within precautions       * Training on energy conservation strategies, correct breathing pattern and techniques to improve independence/tolerance for self-care routine  * Functional transfer/mobility training/DME recommendations for increased independence, safety, and fall prevention  * Patient/Family education to increase follow through with safety techniques and functional independence  * Recommendation of environmental modifications for increased safety with functional transfers/mobility and ADLs  * Therapeutic exercise to improve motor endurance, ROM, and functional strength for ADLs/functional transfers  * Therapeutic activities to facilitate/challenge dynamic balance, stand tolerance for increased safety and independence with ADLs  * Positioning to improve skin integrity, interaction with environment and functional independence     Recommended Adaptive Equipment: tub transfer bench (patient will have family look into the bench)      Home Living: alone; single family home, 1 story, 3 steps to enter with rail, tub shower.        Equipment owned: wheeled walker, cane, wheelchair, elevated toilet seat with rails     Prior Level of Function: Independent with ADLs , Independent with IADLs; ambulated with wheeled walker, sponge bathes, going shopping with son occasionally and uses the wheelchair      Driving: no  Occupation: retired      Pain Level: 4/10 pain in B LE's; Nursing notified.          Cognition: A&O: 4/4; Follows 2 step directions              Memory: good               Sequencing: good               Problem solving: good               Judgement/safety: good      [facility] AM-PAC Daily Activity Inpatient   How much help for putting on and taking off regular lower body clothing?: A Lot  How much help for Bathing?: A Lot  How much help for Toileting?: A Little  How much help for putting on and taking off regular upper body clothing?: A Little  How much help for taking care of personal grooming?: A Little  How much help for eating meals?: None  AM-PAC Inpatient Daily Activity Raw Score: 17  AM-PAC Inpatient ADL T-Scale Score : 37.26  ADL Inpatient CMS 0-100% Score: 50.11  ADL Inpatient CMS G-Code Modifier : CK                Functional Assessment:     Initial Eval Status  Date: 5/17/22 Treatment Status  Date: 5/19/22 STGs = LTGs  Time frame: 10-14 days   Feeding Independent  Independent   Independent    Grooming Supervision  Supervision seated EOB   Independent    UB Dressing Minimal Assist  n/t Independent    LB Dressing Moderate Assist  MOD A to isabella pants requiring assist to thread pants over B LE v/c's for technique  Independent    Bathing Moderate Assist n/t Independent    Toileting Supervision for hygiene and to stand at sink level to wash and dry hands. n/t  Independent    Bed Mobility  Supine to sit: N/T as pt was up in chair   Sit to supine: N/T as pt was returned to bedside chair  Supine>sit SBA   Sit>supine MIN A pt requiring assist to bring L LE into bed v/c's for technique  Supine to sit: Independent   Sit to supine: Independent    Functional Transfers Supervision from bedside chair to wheeled walker. Supervision for transfer to and from low commode with minimal verbal cues to use grab bar for safe commode transfer   Transfer training with verbal cues for hand placement throughout session to improve safety. Supervision sit<>stand from EOB v/c's for safety and hand placement  Independent     Functional Mobility Minimal assist with wheeled walker to improve balance to and from bathroom, verbal cues for walker sequence and safety.   MIN A to side step to [facility] with w/w Modified [place]    Balance Sitting:     Static: good     Dynamic: fair   Standing: fair  with wheeled walker  sitting:   Static: supervision   Dynamic: SBA  Standing: SBA with w/w      Activity Tolerance Fair  Fair  good    Visual/  Perceptual Glasses: yes    Legally blind                    Pt seated EOB engaged in B LE ROM ex's 2 x 10 reps focusing on edema management, and joint protection to increase independence with ADL tasks, and transfers/mobility good results; Comments: Upon arrival pt supine in bed, agreeable to therapy session pt educated with regards to bed mobility, functional transfers, functional mobility, hand placement, walker safety, LE dressing, LE ROM ex's, edema management, DME. At end of session pt supine in bed,  all lines and tubes intact, call light within reach. · Pt has made fair  progress towards set goals.    · Continue with current plan of care      Treatment Time In:1501            Treatment Time Out: [address] [facility] [address]                Treatment Charges: Mins Units   Ther Ex  09014 15 1   Manual Therapy 52275     Thera Activities 35344     ADL/Home Mgt 41444 10 1   Neuro Re-ed 25835     Group Therapy      Orthotic manage/training  40894     Non-Billable Time     Total Timed Treatment [address] BRANDT/L [id number]

## 2022-05-20 LAB
ALBUMIN SERPL-MCNC: 3.1 G/DL (ref 3.5–5.2)
ALP BLD-CCNC: 94 U/L (ref 35–104)
ALT SERPL-CCNC: 15 U/L (ref 0–32)
ANION GAP SERPL CALCULATED.3IONS-SCNC: 13 MMOL/L (ref 7–16)
AST SERPL-CCNC: 20 U/L (ref 0–31)
BILIRUB SERPL-MCNC: <0.2 MG/DL (ref 0–1.2)
BUN BLDV-MCNC: 75 MG/DL (ref 6–23)
CALCIUM SERPL-MCNC: 9.4 MG/DL (ref 8.6–10.2)
CHLORIDE BLD-SCNC: 102 MMOL/L (ref 98–107)
CO2: 24 MMOL/L (ref 22–29)
CREAT SERPL-MCNC: 1.9 MG/DL (ref 0.5–1)
GFR AFRICAN AMERICAN: 30
GFR NON-AFRICAN AMERICAN: 25 ML/MIN/1.73
GLUCOSE BLD-MCNC: 98 MG/DL (ref 74–99)
HCT VFR BLD CALC: 30.8 % (ref 34–48)
HEMOGLOBIN: 9.8 G/DL (ref 11.5–15.5)
MCH RBC QN AUTO: 32 PG (ref 26–35)
MCHC RBC AUTO-ENTMCNC: 31.8 % (ref 32–34.5)
MCV RBC AUTO: 100.7 FL (ref 80–99.9)
PDW BLD-RTO: 14.2 FL (ref 11.5–15)
PLATELET # BLD: 200 E9/L (ref 130–450)
PMV BLD AUTO: 10.7 FL (ref 7–12)
POTASSIUM SERPL-SCNC: 4.7 MMOL/L (ref 3.5–5)
RBC # BLD: 3.06 E12/L (ref 3.5–5.5)
SODIUM BLD-SCNC: 139 MMOL/L (ref 132–146)
TOTAL PROTEIN: 6.3 G/DL (ref 6.4–8.3)
VANCOMYCIN RANDOM: 13.3 MCG/ML (ref 5–40)
WBC # BLD: 5.2 E9/L (ref 4.5–11.5)

## 2022-05-20 PROCEDURE — 80053 COMPREHEN METABOLIC PANEL: CPT

## 2022-05-20 PROCEDURE — 36415 COLL VENOUS BLD VENIPUNCTURE: CPT

## 2022-05-20 PROCEDURE — 2500000003 HC RX 250 WO HCPCS: Performed by: INTERNAL MEDICINE

## 2022-05-20 PROCEDURE — 80202 ASSAY OF VANCOMYCIN: CPT

## 2022-05-20 PROCEDURE — 97530 THERAPEUTIC ACTIVITIES: CPT

## 2022-05-20 PROCEDURE — 85027 COMPLETE CBC AUTOMATED: CPT

## 2022-05-20 PROCEDURE — 97116 GAIT TRAINING THERAPY: CPT

## 2022-05-20 PROCEDURE — 6370000000 HC RX 637 (ALT 250 FOR IP): Performed by: INTERNAL MEDICINE

## 2022-05-20 PROCEDURE — 1200000000 HC SEMI PRIVATE

## 2022-05-20 PROCEDURE — 6360000002 HC RX W HCPCS: Performed by: INTERNAL MEDICINE

## 2022-05-20 PROCEDURE — 2580000003 HC RX 258: Performed by: INTERNAL MEDICINE

## 2022-05-20 RX ORDER — POLYVINYL ALCOHOL 14 MG/ML
1 SOLUTION/ DROPS OPHTHALMIC PRN
Status: DISCONTINUED | OUTPATIENT
Start: 2022-05-20 | End: 2022-05-23 | Stop reason: HOSPADM

## 2022-05-20 RX ADMIN — ACETAMINOPHEN 650 MG: 325 TABLET ORAL at 01:17

## 2022-05-20 RX ADMIN — ASPIRIN 81 MG: 81 TABLET, COATED ORAL at 09:44

## 2022-05-20 RX ADMIN — SACUBITRIL AND VALSARTAN 1 TABLET: 24; 26 TABLET, FILM COATED ORAL at 09:44

## 2022-05-20 RX ADMIN — ATORVASTATIN CALCIUM 20 MG: 20 TABLET, FILM COATED ORAL at 20:02

## 2022-05-20 RX ADMIN — SACUBITRIL AND VALSARTAN 1 TABLET: 24; 26 TABLET, FILM COATED ORAL at 20:02

## 2022-05-20 RX ADMIN — GABAPENTIN 400 MG: 400 CAPSULE ORAL at 20:02

## 2022-05-20 RX ADMIN — VANCOMYCIN HYDROCHLORIDE 750 MG: 1 INJECTION, POWDER, LYOPHILIZED, FOR SOLUTION INTRAVENOUS at 14:15

## 2022-05-20 RX ADMIN — CALCIUM CARBONATE-VITAMIN D TAB 500 MG-200 UNIT 1 TABLET: 500-200 TAB at 09:44

## 2022-05-20 RX ADMIN — Medication 2000 UNITS: at 09:44

## 2022-05-20 RX ADMIN — BUMETANIDE 1 MG: 0.25 INJECTION, SOLUTION INTRAMUSCULAR; INTRAVENOUS at 09:45

## 2022-05-20 ASSESSMENT — PAIN SCALES - GENERAL
PAINLEVEL_OUTOF10: 0
PAINLEVEL_OUTOF10: 6

## 2022-05-20 ASSESSMENT — PAIN DESCRIPTION - ORIENTATION: ORIENTATION: RIGHT;LEFT

## 2022-05-20 ASSESSMENT — ENCOUNTER SYMPTOMS
SHORTNESS OF BREATH: 1
COUGH: 0
DIARRHEA: 0

## 2022-05-20 ASSESSMENT — PAIN DESCRIPTION - LOCATION: LOCATION: LEG

## 2022-05-20 ASSESSMENT — PAIN DESCRIPTION - DESCRIPTORS: DESCRIPTORS: BURNING;ITCHING

## 2022-05-20 ASSESSMENT — PAIN - FUNCTIONAL ASSESSMENT: PAIN_FUNCTIONAL_ASSESSMENT: ACTIVITIES ARE NOT PREVENTED

## 2022-05-20 NOTE — PLAN OF CARE
Problem: Discharge Planning  Goal: Discharge to home or other facility with appropriate resources  Outcome: Progressing     Problem: Safety - Adult  Goal: Free from fall injury  Outcome: Progressing     Problem: ABCDS Injury Assessment  Goal: Absence of physical injury  Outcome: Progressing     Problem: Pain  Goal: Verbalizes/displays adequate comfort level or baseline comfort level  Outcome: Progressing  Flowsheets (Taken 5/20/2022 0117 by Margoth Lawton RN)  Verbalizes/displays adequate comfort level or baseline comfort level:   Encourage patient to monitor pain and request assistance   Assess pain using appropriate pain scale   Administer analgesics based on type and severity of pain and evaluate response   Notify Licensed Independent Practitioner if interventions unsuccessful or patient reports new pain     Problem: Skin/Tissue Integrity  Goal: Absence of new skin breakdown  Description: 1. Monitor for areas of redness and/or skin breakdown  2. Assess vascular access sites hourly  3. Every 4-6 hours minimum:  Change oxygen saturation probe site  4. Every 4-6 hours:  If on nasal continuous positive airway pressure, respiratory therapy assess nares and determine need for appliance change or resting period.   Outcome: Progressing     Problem: Chronic Conditions and Co-morbidities  Goal: Patient's chronic conditions and co-morbidity symptoms are monitored and maintained or improved  Outcome: Progressing

## 2022-05-20 NOTE — PROGRESS NOTES
Pharmacy Consultation Note  (Antibiotic Dosing and Monitoring)    Initial consult date: 5/19/22  Consulting physician/provider: Dr. Betina Julien  Drug: Vancomycin  Indication: SSTI    Age/  Gender Height Weight IBW  Allergy Information   89 y.o./female 5' 2\" (157.5 cm) 138 lb (62.6 kg)     Ideal body weight: 50.1 kg (110 lb 7.2 oz)  Adjusted ideal body weight: 55.1 kg (121 lb 7.5 oz)   Flagyl [metronidazole], Adhesive tape, and Penicillin g      Renal Function:  Recent Labs     05/18/22  0544 05/19/22  0514 05/20/22  0538   BUN 72* 76* 75*   CREATININE 2.2* 2.1* 1.9*       Intake/Output Summary (Last 24 hours) at 5/20/2022 1206  Last data filed at 5/20/2022 0929  Gross per 24 hour   Intake 700 ml   Output 0 ml   Net 700 ml       Vancomycin Monitoring:  Trough:  No results for input(s): VANCOTROUGH in the last 72 hours. Random:    Recent Labs     05/20/22  0538   VANCORANDOM 13.3       Vancomycin Administration Times:  Recent vancomycin administrations                   vancomycin (VANCOCIN) 1,000 mg in dextrose 5 % 250 mL IVPB (mg) 1,000 mg New Bag 05/19/22 0931                  Assessment:  · Patient is a 80 y.o. female who has been initiated on vancomycin for SSTI. · Estimated Creatinine Clearance: 16 mL/min (A) (based on SCr of 1.9 mg/dL (H)). Patient's baseline SCr is ~1.3-1.5.   · To dose vancomycin, pharmacy will be utilizing dosing based off of levels because of patient's renal impairment/insufficiency   · 5/19: Received Vancomycin 1,000 mg x 1 this morning  · 5/20: Random AM level @ 0538 = 13.3 mcg/mL    Plan:  · Re-dose with Vancomycin 750 mg IV x 1  · Levels PRN  · Will continue to monitor renal function   · Clinical pharmacy to follow      Victor Baumgarten, PharmD, BCPS 5/20/2022 12:06 PM   [phone]

## 2022-05-20 NOTE — CARE COORDINATION
5/20/22 CM transition of care: Cm met with patient who is alert and oriented, very pleasant lady. Therapies recommending [facility]- list left at pts bedside, she has used [facility] in the past but declines the need for [facility] at discharge. Not seeing entresto pta- requested savings card be given to patient/placed in soft blue chart. Not seeing zyvox at this time, iv vanco currently. CM following.  Electronically signed by Rebbeca Dubin, RN-BC on 5/20/2022 at 1:41 PM

## 2022-05-20 NOTE — CARE COORDINATION
Free 30 day trial card for Delfin Liriano with instructions was placed in patients chart. Please go over with the patient at discharge.  Electronically signed by Dhara Gomez on 5/20/2022 at 1:59 PM

## 2022-05-20 NOTE — PROGRESS NOTES
Physical Therapy        0339/0339-01    Patient unavailable for physical therapy treatment due to patient with nursing at the time. Will check back at later time/date.      Chip Mccauley, PTA  #[id number]

## 2022-05-20 NOTE — PROGRESS NOTES
Physical Therapy Treatment Note/Plan of Care    Room #:  0889/5042-32  Patient Name: Guillermina Wilde  YOB: 1928  MRN: [id number]    Date of Service: 5/20/2022     Tentative placement recommendation: Home Health Physical Therapy   Equipment recommendation: Patient has needed equipment       Evaluating Physical Therapist: Natalya Sue, [facility] #[id number]      Specific Provider Orders/Date/Referring Provider :  05/17/22 0845   PT eval and treat Start: 05/17/22 0845, End: 05/17/22 0845, ONE TIME, Standing Count: 1 Occurrences, R    Moisés Castro MD      Admitting Diagnosis:   Cellulitis of lower extremity, unspecified laterality [W04.531]     non healing ulcers of left leg and it was healing and suddenly got worse. Her legs are swelling with fluid oozing fluid out. 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 Patient able to increase gait distance today with supervision and assist with IV pole. Patient has no loss of balance throughout. Patient pleasant and motivated. Patient requires skilled physical therapy to address concerns listed above to increase safety and independence at discharge.          PHYSICAL THERAPY  PLAN OF CARE       Physical therapy plan of care is established based on physician order,  patient diagnosis and clinical assessment    Current Treatment Recommendations:    -Bed Mobility: Lower extremity exercises   -Sitting Balance: Incorporate reaching activities to activate trunk muscles   -Standing Balance: Perform strengthening exercises in standing to promote motor control with or without upper extremity support   -Transfers: Provide instruction on proper hand and foot position for adequate transfer of weight onto lower extremities and use of gait device if needed and Cues for hand placement, technique and safety. Provide stabilization to prevent fall   -Gait: Gait training, Standing activities to improve: base of support, weight shift, weight bearing  and Pregait training to emphasize: Increased step length , Upright and Safety   -Endurance: Utilize Supervised activities to increase level of endurance to allow for safe functional mobility including transfers and gait   -Stairs: Stair training with instruction on proper technique and hand placement on rail    PT long term treatment goals are located in below grid    Patient and or family understand(s) diagnosis, prognosis, and plan of care. Frequency of treatments: Patient will be seen  daily.          Prior Level of Function: Patient ambulated with wheeled walker    Rehab Potential: good    for baseline    Past medical history:   Past Medical History:   Diagnosis Date    [facility]     Cancer ([facility]     melonoma - back; left breast CA    Chronic back pain     CKD (chronic kidney disease) stage 3, GFR 30-59 ml/min ([facility])     Hyperlipidemia     Hypertension     Macular degeneration     Osteoarthritis     Restless legs syndrome      Past Surgical History:   Procedure Laterality Date    BREAST LUMPECTOMY      BREAST SURGERY Left 6/3/15    lumpectomy    CATARACT REMOVAL WITH IMPLANT Right 3 8 16    COLONOSCOPY  5/27/15    EYE SURGERY Right 1/28/2022    PARS PLANA VITRECTOMY, ENDO LASER, GAS FLUID EXCHANGE, 25 G, MAC, RIGHT EYE performed by Kadie Hernandez MD [place] at [address]      with uterus suspension.  MOHS SURGERY         SUBJECTIVE:    Precautions:  , falls ,    Social history: Patient lives alone in a ranch home  with 3 steps  to enter with [address] bathes     Equipment owned: Wheelchair, Cane and Lobito Carr,       [address] [facility]   How much difficulty turning over in bed?: A Little  How much difficulty sitting down on / standing up from a chair with arms?: A Little  How much difficulty moving from lying on back to sitting on side of bed?: A Little  How much help from another person moving to and from a bed to a chair?: A Little  How much help from another person needed to walk in hospital room?: A Little  How much help from another person for climbing 3-5 steps with a railing?: A Lot  AM-PAC Inpatient Mobility Raw Score : 17  AM-PAC Inpatient T-Scale Score : 42.13  Mobility Inpatient CMS 0-100% Score: 50.57  Mobility Inpatient CMS G-Code Modifier : CK    Nursing cleared patient for PT treatment. OBJECTIVE;   Initial Evaluation  Date: 5/17/2022 Treatment Date:    5/20/2022   Short Term/ Long Term   Goals   Was pt agreeable to Eval/treatment? Yes Yes To be met in 3 days   Pain level   5/10  Bilateral ankles Pain in legs, no number assigned    Bed Mobility    Rolling: Minimal assist of 1    Supine to sit: Minimal assist of 1    Sit to supine: Minimal assist of 1    Scooting: Minimal assist of 1   Rolling: Supervision    Supine to sit: Supervision    Sit to supine: Not assessed patient in chair   Scooting: Supervision     Rolling: Independent    Supine to sit:  Independent    Sit to supine: Independent    Scooting: Independent     Transfers Sit to stand: Minimal assist of 1 Cues for hand placement and safety  Sit to stand: Supervision  cues for hand placement      Sit to stand: Independent     Ambulation    1 x 15 feet 1 x 10 feet using  wheeled walker with Minimal assist of 1   for walker control and cues for upright posture, increased step length and safety 2x55 feet using  wheeled walker with Supervision   assist with IV pole cues for safety and pacing    100 feet using  wheeled walker with Modified Independent    Stair negotiation: ascended and descended   Not assessed  Not assessed      3 steps 1 rail with supervision    ROM Within functional limits    Increase range of motion 10% of affected joints    Strength BUE:  refer to OT eval  RLE:  3+/5  LLE:  3+/5  Increase strength in affected mm groups by 1/3 grade   Balance Sitting EOB:  good    Dynamic Standing:  fair + wheeled walker  Sitting EOB: good   Dynamic Standing: fair + wheeled walker     Sitting EOB:  good    Dynamic Standing: good wheeled walker      Patient is Alert & Oriented x person, place, time and situation and follows directions    Sensation:  Patient  reports numbness/tingling bilateral upper extremities     Edema:  yes bilateral lower extremities   Endurance: fair  +    Vitals: room air   Blood Pressure at rest  Blood Pressure during session    Heart Rate at rest   Heart Rate during session     SPO2 at rest %  SPO2 during session %     Patient education  Patient educated on role of Physical Therapy, risks of immobility, safety and plan of care,  importance of mobility while in hospital  and safety      Patient response to education:   Pt verbalized understanding Pt demonstrated skill Pt requires further education in this area   Yes Partial Yes      Treatment:  Patient practiced and was instructed/facilitated in the following treatment: Patient transferred to edge of bed and stood to [facility] to bathroom. Patient stood to [facility] into hallway and back to room in chair. Patient wanted to get washed up, provided with supplies. Therapeutic Exercises:  not performed    At end of session, patient in chair  , nursing notified.       Patient would benefit from continued skilled Physical Therapy to improve functional independence and quality of life.          Patient's/ family goals   home    Time in  314  Time out  337    Total Treatment Time  23 minutes    CPT codes:  Therapeutic activities (10576)   15 minutes  1 unit(s)  Gait Training (49383) 8 minutes 1 unit(s)    Ana Gomez, hospitals #[id number]

## 2022-05-20 NOTE — PROGRESS NOTES
Dressing changed on left leg, leg cleansed with warm bath wipes. New dressing applied clean dry and intact. Pt tolerated well. No complaint of pain or discomfort.

## 2022-05-20 NOTE — PROGRESS NOTES
Progress Note  Date:2022       Room:0339/0339-01  Patient Matilda Thompson     YOB: 1928     Age:89 y.o. Subjective    Subjective:  Symptoms:  Stable. She reports shortness of breath, malaise, weakness and anxiety. No cough, chest pain, headache, chest pressure, anorexia or diarrhea. Diet:  Adequate intake. Activity level: Impaired due to weakness. Pain:  She complains of pain that is moderate. Review of Systems   Respiratory: Positive for shortness of breath. Negative for cough. Cardiovascular: Negative for chest pain. Gastrointestinal: Negative for anorexia and diarrhea. Neurological: Positive for weakness. Objective         Vitals Last 24 Hours:  TEMPERATURE:  Temp  Av °F (36.1 °C)  Min: 95.3 °F (35.2 °C)  Max: 98.6 °F (37 °C)  RESPIRATIONS RANGE: Resp  Av  Min: 18  Max: 18  PULSE OXIMETRY RANGE: SpO2  Av %  Min: 97 %  Max: 97 %  PULSE RANGE: Pulse  Av.7  Min: 56  Max: 73  BLOOD PRESSURE RANGE: Systolic (16LFE), KEC:200 , Min:105 , UXI:977   ; Diastolic (85RCY), LGF:37, Min:35, Max:71    I/O (24Hr): Intake/Output Summary (Last 24 hours) at 2022  Last data filed at 2022 1831  Gross per 24 hour   Intake 580 ml   Output 0 ml   Net 580 ml     Objective:  General Appearance:  Comfortable. Vital signs: (most recent): Blood pressure (!) 112/55, pulse 56, temperature 95.3 °F (35.2 °C), temperature source Axillary, resp. rate 18, height 5' 2\" (1.575 m), weight 138 lb (62.6 kg), SpO2 97 %. Vital signs are normal.    Output: Producing urine. HEENT: Normal HEENT exam.    Lungs:  Normal respiratory rate. She is in respiratory distress. Heart: Regular rhythm. S1 normal and S2 normal.    Abdomen: Abdomen is soft. Bowel sounds are normal.   There is no abdominal tenderness. Extremities: Normal range of motion. Pulses: Distal pulses are intact.     Neurological: Patient is alert and oriented to person, place and time.  Normal strength. Pupils:  Pupils are equal, round, and reactive to light. Labs/Imaging/Diagnostics    Labs:  CBC:  Recent Labs     05/17/22 0544 05/18/22  0544 05/19/22  0514   WBC 5.1 5.5 5.6   RBC 2.83* 3.00* 2.98*   HGB 9.0* 9.5* 9.3*   HCT 28.6* 30.1* 29.5*   .1* 100.3* 99.0   RDW 14.6 14.5 14.5    200 190     CHEMISTRIES:  Recent Labs     05/17/22 0544 05/18/22 0544 05/19/22  0514    140 138   K 5.1* 5.0 4.7    104 101   CO2 24 25 26   BUN 76* 72* 76*   CREATININE 2.3* 2.2* 2.1*   GLUCOSE 88 89 92     PT/INR:No results for input(s): PROTIME, INR in the last 72 hours. APTT:No results for input(s): APTT in the last 72 hours. LIVER PROFILE:  Recent Labs     05/17/22 0544 05/18/22 0544 05/19/22  0514   AST 21 21 20   ALT 15 16 15   BILITOT <0.2 0.2 0.2   ALKPHOS 82 86 82       Imaging Last 24 Hours:  No results found. 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 ([address] 5/16/2022 Yes        Assessment:  (Problem List as of 5/19/2022 Reviewed: 12/6/2019  1:01 PM by David Acevedo MD    * (Principal) Cellulitis of lower extremity    Hyperlipidemia    Chronic back pain    Non-healing ulcer ([address]    CKD (chronic kidney disease) stage 3, GFR 30-59 ml/min (HCC)    Chronic diastolic heart failure (HCC)    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      Plan:   (Switch abx to vancomycin . Pharmacy to dose).        Electronically signed by Ruperto Bautista MD on 5/19/22 at 10:31 PM EDT

## 2022-05-21 LAB
ALBUMIN SERPL-MCNC: 2.9 G/DL (ref 3.5–5.2)
ALP BLD-CCNC: 85 U/L (ref 35–104)
ALT SERPL-CCNC: 13 U/L (ref 0–32)
ANION GAP SERPL CALCULATED.3IONS-SCNC: 11 MMOL/L (ref 7–16)
AST SERPL-CCNC: 20 U/L (ref 0–31)
BILIRUB SERPL-MCNC: <0.2 MG/DL (ref 0–1.2)
BUN BLDV-MCNC: 79 MG/DL (ref 6–23)
CALCIUM SERPL-MCNC: 9.1 MG/DL (ref 8.6–10.2)
CHLORIDE BLD-SCNC: 103 MMOL/L (ref 98–107)
CO2: 25 MMOL/L (ref 22–29)
CREAT SERPL-MCNC: 1.8 MG/DL (ref 0.5–1)
GFR AFRICAN AMERICAN: 32
GFR NON-AFRICAN AMERICAN: 26 ML/MIN/1.73
GLUCOSE BLD-MCNC: 95 MG/DL (ref 74–99)
HCT VFR BLD CALC: 28.8 % (ref 34–48)
HEMOGLOBIN: 9.2 G/DL (ref 11.5–15.5)
MCH RBC QN AUTO: 31.6 PG (ref 26–35)
MCHC RBC AUTO-ENTMCNC: 31.9 % (ref 32–34.5)
MCV RBC AUTO: 99 FL (ref 80–99.9)
PDW BLD-RTO: 14.2 FL (ref 11.5–15)
PLATELET # BLD: 187 E9/L (ref 130–450)
PMV BLD AUTO: 10.4 FL (ref 7–12)
POTASSIUM SERPL-SCNC: 4.6 MMOL/L (ref 3.5–5)
RBC # BLD: 2.91 E12/L (ref 3.5–5.5)
SODIUM BLD-SCNC: 139 MMOL/L (ref 132–146)
TOTAL PROTEIN: 5.8 G/DL (ref 6.4–8.3)
VANCOMYCIN RANDOM: 18.7 MCG/ML (ref 5–40)
WBC # BLD: 5.5 E9/L (ref 4.5–11.5)

## 2022-05-21 PROCEDURE — 6370000000 HC RX 637 (ALT 250 FOR IP): Performed by: INTERNAL MEDICINE

## 2022-05-21 PROCEDURE — 6360000002 HC RX W HCPCS: Performed by: INTERNAL MEDICINE

## 2022-05-21 PROCEDURE — 80053 COMPREHEN METABOLIC PANEL: CPT

## 2022-05-21 PROCEDURE — 36415 COLL VENOUS BLD VENIPUNCTURE: CPT

## 2022-05-21 PROCEDURE — 97110 THERAPEUTIC EXERCISES: CPT

## 2022-05-21 PROCEDURE — 97116 GAIT TRAINING THERAPY: CPT

## 2022-05-21 PROCEDURE — 1200000000 HC SEMI PRIVATE

## 2022-05-21 PROCEDURE — 80202 ASSAY OF VANCOMYCIN: CPT

## 2022-05-21 PROCEDURE — 2580000003 HC RX 258: Performed by: INTERNAL MEDICINE

## 2022-05-21 PROCEDURE — 85027 COMPLETE CBC AUTOMATED: CPT

## 2022-05-21 PROCEDURE — 2500000003 HC RX 250 WO HCPCS: Performed by: INTERNAL MEDICINE

## 2022-05-21 RX ADMIN — VANCOMYCIN HYDROCHLORIDE 750 MG: 5 INJECTION, POWDER, LYOPHILIZED, FOR SOLUTION INTRAVENOUS at 20:15

## 2022-05-21 RX ADMIN — BUMETANIDE 1 MG: 0.25 INJECTION, SOLUTION INTRAMUSCULAR; INTRAVENOUS at 09:06

## 2022-05-21 RX ADMIN — ACETAMINOPHEN 650 MG: 325 TABLET ORAL at 02:19

## 2022-05-21 RX ADMIN — SACUBITRIL AND VALSARTAN 1 TABLET: 24; 26 TABLET, FILM COATED ORAL at 09:06

## 2022-05-21 RX ADMIN — Medication 2000 UNITS: at 09:06

## 2022-05-21 RX ADMIN — GABAPENTIN 400 MG: 400 CAPSULE ORAL at 20:06

## 2022-05-21 RX ADMIN — ATORVASTATIN CALCIUM 20 MG: 20 TABLET, FILM COATED ORAL at 20:06

## 2022-05-21 RX ADMIN — ASPIRIN 81 MG: 81 TABLET, COATED ORAL at 09:06

## 2022-05-21 RX ADMIN — SACUBITRIL AND VALSARTAN 1 TABLET: 24; 26 TABLET, FILM COATED ORAL at 20:07

## 2022-05-21 RX ADMIN — CALCIUM CARBONATE-VITAMIN D TAB 500 MG-200 UNIT 1 TABLET: 500-200 TAB at 09:06

## 2022-05-21 RX ADMIN — ACETAMINOPHEN 650 MG: 325 TABLET ORAL at 15:50

## 2022-05-21 ASSESSMENT — PAIN SCALES - GENERAL
PAINLEVEL_OUTOF10: 0
PAINLEVEL_OUTOF10: 5
PAINLEVEL_OUTOF10: 4
PAINLEVEL_OUTOF10: 0
PAINLEVEL_OUTOF10: 0

## 2022-05-21 ASSESSMENT — ENCOUNTER SYMPTOMS
DIARRHEA: 0
SHORTNESS OF BREATH: 1
COUGH: 0

## 2022-05-21 ASSESSMENT — PAIN DESCRIPTION - LOCATION
LOCATION: LEG
LOCATION: LEG

## 2022-05-21 ASSESSMENT — PAIN DESCRIPTION - ONSET: ONSET: GRADUAL

## 2022-05-21 ASSESSMENT — PAIN - FUNCTIONAL ASSESSMENT: PAIN_FUNCTIONAL_ASSESSMENT: ACTIVITIES ARE NOT PREVENTED

## 2022-05-21 ASSESSMENT — PAIN DESCRIPTION - PAIN TYPE: TYPE: ACUTE PAIN

## 2022-05-21 ASSESSMENT — PAIN DESCRIPTION - ORIENTATION
ORIENTATION: RIGHT;LEFT
ORIENTATION: LEFT;RIGHT

## 2022-05-21 ASSESSMENT — PAIN DESCRIPTION - FREQUENCY: FREQUENCY: CONTINUOUS

## 2022-05-21 NOTE — PROGRESS NOTES
Pharmacy Consultation Note  (Antibiotic Dosing and Monitoring)    Initial consult date: 5/19/22  Consulting physician/provider: Dr. Apoorva Falcon  Drug: Vancomycin  Indication: SSTI    Age/  Gender Height Weight IBW  Allergy Information   89 y.o./female 5' 2\" (157.5 cm) 138 lb (62.6 kg)     Ideal body weight: 50.1 kg (110 lb 7.2 oz)  Adjusted ideal body weight: 55.1 kg (121 lb 7.5 oz)   Flagyl [metronidazole], Adhesive tape, and Penicillin g      Renal Function:  Recent Labs     05/19/22  0514 05/20/22  0538 05/21/22  0537   BUN 76* 75* 79*   CREATININE 2.1* 1.9* 1.8*       Intake/Output Summary (Last 24 hours) at 5/21/2022 1057  Last data filed at 5/21/2022 0545  Gross per 24 hour   Intake 800 ml   Output --   Net 800 ml       Vancomycin Monitoring:  Trough:  No results for input(s): VANCOTROUGH in the last 72 hours. Random:    Recent Labs     05/20/22  0538 05/21/22  1004   VANCORANDOM 13.3 18.7     Recent vancomycin administrations                   vancomycin (VANCOCIN) 750 mg in dextrose 5 % 250 mL IVPB (mg) 750 mg New Bag 05/20/22 1415    vancomycin (VANCOCIN) 1,000 mg in dextrose 5 % 250 mL IVPB (mg) 1,000 mg New Bag 05/19/22 0931              Assessment:  · Patient is a 80 y.o. female who has been initiated on vancomycin for SSTI. · Estimated Creatinine Clearance: 17 mL/min (A) (based on SCr of 1.8 mg/dL (H)). Patient's baseline SCr is ~1.3-1.5.   · To dose vancomycin, pharmacy will be utilizing dosing based off of levels because of patient's renal impairment/insufficiency   · 5/19: Received Vancomycin 1,000 mg x 1 this morning  · 5/20: Random AM level @ 0538 = 13.3 mcg/mL  · 5/21: Random level @ 1004 = 18.7 mcg/mL    Plan:  · Re-dose with Vancomycin 750 mg IV x 1 tonight @ 2100  · Levels PRN  · Will continue to monitor renal function   · Clinical pharmacy to follow    Angelic Kyle PharmD, BCPS 5/21/2022 10:58 AM   Ext: 2033

## 2022-05-21 NOTE — PROGRESS NOTES
Progress Note  Date:2022       Room:0339/0339-01  Patient Shakila Tabor     YOB: 1928     Age:89 y.o. Subjective    Subjective:  Symptoms:  Stable. She reports shortness of breath, malaise, weakness and anxiety. No cough, chest pain, headache, chest pressure, anorexia or diarrhea. Diet:  Adequate intake. Activity level: Impaired due to weakness. Pain:  She complains of pain that is mild. Review of Systems   Respiratory: Positive for shortness of breath. Negative for cough. Cardiovascular: Negative for chest pain. Gastrointestinal: Negative for anorexia and diarrhea. Neurological: Positive for weakness. Objective         Vitals Last 24 Hours:  TEMPERATURE:  Temp  Av °F (36.7 °C)  Min: 97.5 °F (36.4 °C)  Max: 98.6 °F (37 °C)  RESPIRATIONS RANGE: Resp  Av  Min: 16  Max: 16  PULSE OXIMETRY RANGE: SpO2  Av.7 %  Min: 98 %  Max: 100 %  PULSE RANGE: Pulse  Av.3  Min: 61  Max: 78  BLOOD PRESSURE RANGE: Systolic (20PLE), IGO:057 , Min:96 , LEI:999   ; Diastolic (18OPV), NMR:44, Min:52, Max:57    I/O (24Hr): Intake/Output Summary (Last 24 hours) at 2022 2317  Last data filed at 2022 2232  Gross per 24 hour   Intake 1060 ml   Output --   Net 1060 ml     Objective:  General Appearance:  Comfortable. Vital signs: (most recent): Blood pressure (!) 96/57, pulse 78, temperature 98.6 °F (37 °C), temperature source Oral, resp. rate 16, height 5' 2\" (1.575 m), weight 138 lb (62.6 kg), SpO2 98 %. Vital signs are normal.    Output: Producing urine. HEENT: Normal HEENT exam.    Lungs:  Normal respiratory rate. She is not in respiratory distress. Heart: Regular rhythm. S1 normal and S2 normal.    Abdomen: Abdomen is soft. Bowel sounds are normal.   There is no abdominal tenderness. Extremities: Normal range of motion. Pulses: Distal pulses are intact. Neurological: Patient is alert and oriented to person, place and time. Normal strength. Pupils:  Pupils are equal, round, and reactive to light. Labs/Imaging/Diagnostics    Labs:  CBC:  Recent Labs     05/18/22  0544 05/19/22  0514 05/20/22  0538   WBC 5.5 5.6 5.2   RBC 3.00* 2.98* 3.06*   HGB 9.5* 9.3* 9.8*   HCT 30.1* 29.5* 30.8*   .3* 99.0 100.7*   RDW 14.5 14.5 14.2    190 200     CHEMISTRIES:  Recent Labs     05/18/22  0544 05/19/22  0514 05/20/22  0538    138 139   K 5.0 4.7 4.7    101 102   CO2 25 26 24   BUN 72* 76* 75*   CREATININE 2.2* 2.1* 1.9*   GLUCOSE 89 92 98     PT/INR:No results for input(s): PROTIME, INR in the last 72 hours. APTT:No results for input(s): APTT in the last 72 hours. LIVER PROFILE:  Recent Labs     05/18/22  0544 05/19/22  0514 05/20/22  0538   AST 21 20 20   ALT 16 15 15   BILITOT 0.2 0.2 <0.2   ALKPHOS 86 82 94       Imaging Last 24 Hours:  No results found. 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 ([address] 5/16/2022 Yes        Assessment:  (Problem List as of 5/20/2022 Reviewed: 12/6/2019  1:01 PM by Malu Gonzales MD    * (Principal) Cellulitis of lower extremity    Hyperlipidemia    Chronic back pain    Non-healing ulcer ([address]    CKD (chronic kidney disease) stage 3, GFR 30-59 ml/min (HCC)    Chronic diastolic heart failure (HCC)    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 Plan:   (Cont with med. Possible home with zyvox). Electronically signed by Mi Ochoa MD on 5/20/22 at 11:17 PM EDT

## 2022-05-21 NOTE — PROGRESS NOTES
Physical Therapy Treatment Note/Plan of Care    Room #:  9900/1004-54  Patient Name: Malissa Rivas  YOB: 1928  MRN: [id number]    Date of Service: 5/21/2022     Tentative placement recommendation: Home Health Physical Therapy   Equipment recommendation: Patient has needed equipment       Evaluating Physical Therapist: Monty Rodríguez [facility] #[id number]      Specific Provider Orders/Date/Referring Provider :  05/17/22 0845   PT eval and treat Start: 05/17/22 0845, End: 05/17/22 0845, ONE TIME, Standing Count: 1 Occurrences, R    Evelia Casarez MD      Admitting Diagnosis:   Cellulitis of lower extremity, unspecified laterality [Z33.989]     non healing ulcers of left leg and it was healing and suddenly got worse. Her legs are swelling with fluid oozing fluid out. 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 Patient able to increase gait distance today with min A and wheeled walker, cues for step length and pacing. Patient requires skilled physical therapy to address concerns listed above to increase safety and independence at discharge.          PHYSICAL THERAPY  PLAN OF CARE       Physical therapy plan of care is established based on physician order,  patient diagnosis and clinical assessment    Current Treatment Recommendations:    -Bed Mobility: Lower extremity exercises   -Sitting Balance: Incorporate reaching activities to activate trunk muscles   -Standing Balance: Perform strengthening exercises in standing to promote motor control with or without upper extremity support   -Transfers: Provide instruction on proper hand and foot position for adequate transfer of weight onto lower extremities and use of gait device if needed and Cues for hand placement, technique and safety. Provide stabilization to prevent fall   -Gait: Gait training, Standing activities to improve: base of support, weight shift, weight bearing  and Pregait training to emphasize: Increased step length , Upright and Safety   -Endurance: Utilize Supervised activities to increase level of endurance to allow for safe functional mobility including transfers and gait   -Stairs: Stair training with instruction on proper technique and hand placement on rail    PT long term treatment goals are located in below grid    Patient and or family understand(s) diagnosis, prognosis, and plan of care. Frequency of treatments: Patient will be seen  daily.          Prior Level of Function: Patient ambulated with wheeled walker    Rehab Potential: good    for baseline    Past medical history:   Past Medical History:   Diagnosis Date    [facility]     Cancer [facility]     melonoma - back; left breast CA    Chronic back pain     CKD (chronic kidney disease) stage 3, GFR 30-59 ml/min ([facility])     Hyperlipidemia     Hypertension     Macular degeneration     Osteoarthritis     Restless legs syndrome      Past Surgical History:   Procedure Laterality Date    BREAST LUMPECTOMY      BREAST SURGERY Left 6/3/15    lumpectomy    CATARACT REMOVAL WITH IMPLANT Right 3 8 16    COLONOSCOPY  5/27/15    EYE SURGERY Right 1/28/2022    PARS PLANA VITRECTOMY, ENDO LASER, GAS FLUID EXCHANGE, 25 G, MAC, RIGHT EYE performed by Alex Perez MD at [address] [place] [address] HYSTERECTOMY      with uterus suspension.  MOHS SURGERY         SUBJECTIVE:    Precautions:  , falls ,    Social history: Patient lives alone in a ranch home  with 3 steps  to enter with [address] bathes     Equipment owned: Wheelchair, Cane and Floyde Ort,       [address]   How much difficulty turning over in bed?: A Little  How much difficulty sitting down on / standing up from a chair with arms?: A Little  How much difficulty moving from lying on back to sitting on side of bed?: A Little  How much help from another person moving to and from a bed to a chair?: A Little  How much help from another person needed to walk in hospital room?: A Little  How much help from another person for climbing 3-5 steps with a railing?: A Lot  AM-PAC Inpatient Mobility Raw Score : 17  AM-PAC Inpatient T-Scale Score : 42.13  Mobility Inpatient CMS 0-100% Score: 50.57  Mobility Inpatient CMS G-Code Modifier : CK    Nursing cleared patient for PT treatment. at side of the bed start of session. OBJECTIVE;   Initial Evaluation  Date: 5/17/2022 Treatment Date:    5/21/2022   Short Term/ Long Term   Goals   Was pt agreeable to Eval/treatment? Yes Yes To be met in 3 days   Pain level   5/10  Bilateral ankles 3-4 /10  ankles    Bed Mobility    Rolling: Minimal assist of 1    Supine to sit: Minimal assist of 1    Sit to supine: Minimal assist of 1    Scooting: Minimal assist of 1   Rolling: Not assessed patient in chair   Supine to sit: Not assessed patient in chair   Sit to supine: Not assessed patient in chair   Scooting: Not assessed patient in chair    Rolling: Independent    Supine to sit:  Independent    Sit to supine: Independent    Scooting: Independent     Transfers Sit to stand: Minimal assist of 1 Cues for hand placement and safety  Sit to stand: Minimal assist of 1 cues for hand placement from side of bed ( pt already at side of bed eating breakfast )   Sit to stand: Independent     Ambulation    1 x 15 feet 1 x 10 feet using  wheeled walker with Minimal assist of 1   for walker control and cues for upright posture, increased step length and safety 2 x 50 feet using  wheeled walker with Minimal assist of 1   cues for upright posture, safety, proper hand placement and pacing    100 feet using  wheeled walker with Modified Independent    Stair negotiation: ascended and descended   Not assessed  Not assessed      3 steps 1 rail with supervision    ROM Within functional limits    Increase range of motion 10% of affected joints    Strength BUE:  refer to OT eval  RLE:  3+/5  LLE:  3+/5  Increase strength in affected mm groups by 1/3 grade   Balance Sitting EOB:  good    Dynamic Standing:  fair + wheeled walker  Sitting EOB: good   Dynamic Standing: fair + wheeled walker     Sitting EOB:  good    Dynamic Standing: good wheeled walker      Patient is Alert & Oriented x person, place, time and situation and follows directions    Sensation:  Patient  reports numbness/tingling bilateral upper extremities     Edema:  yes bilateral lower extremities   Endurance: fair  +    Vitals: room air   Blood Pressure at rest  Blood Pressure during session    Heart Rate at rest   Heart Rate during session     SPO2 at rest 97%  SPO2 during session 94-98%     Patient education  Patient educated on role of Physical Therapy, risks of immobility, safety and plan of care,  importance of mobility while in hospital  and ankle pumps, quad set and glut set for edema control, blood clot prevention     Patient response to education:   Pt verbalized understanding Pt demonstrated skill Pt requires further education in this area   Yes Partial Yes      Treatment:  Patient practiced and was instructed/facilitated in the following treatment: Patient  pt already at side of bed with assist of nursing . Pt stood and ambulated x 1reps, rest break in between. Pt performed exercises.       Therapeutic Exercises:  ankle pumps, heel raises, glut sets, long arc quad and seated marching  Also Bilateral UE's shoulder flexion, elbow flexion ext  1 x 15 reps. At end of session, patient sitting edge of bed  , nursing notified./ already aware . Patient would benefit from continued skilled Physical Therapy to improve functional independence and quality of life.          Patient's/ family goals   home    Time in  0825 am  Time out  0855 am    Total Treatment Time  30 minutes    CPT codes:  Therapeutic exercises (68151)   20 minutes  1 unit(s)  Gait Training (51154) 10 minutes 1 unit(s)    Lauren Nelson, PTA #[id number]

## 2022-05-22 LAB
ALBUMIN SERPL-MCNC: 3 G/DL (ref 3.5–5.2)
ALP BLD-CCNC: 83 U/L (ref 35–104)
ALT SERPL-CCNC: 14 U/L (ref 0–32)
ANION GAP SERPL CALCULATED.3IONS-SCNC: 12 MMOL/L (ref 7–16)
AST SERPL-CCNC: 19 U/L (ref 0–31)
BILIRUB SERPL-MCNC: <0.2 MG/DL (ref 0–1.2)
BUN BLDV-MCNC: 86 MG/DL (ref 6–23)
CALCIUM SERPL-MCNC: 9.2 MG/DL (ref 8.6–10.2)
CHLORIDE BLD-SCNC: 101 MMOL/L (ref 98–107)
CO2: 25 MMOL/L (ref 22–29)
CREAT SERPL-MCNC: 1.9 MG/DL (ref 0.5–1)
GFR AFRICAN AMERICAN: 30
GFR NON-AFRICAN AMERICAN: 25 ML/MIN/1.73
GLUCOSE BLD-MCNC: 91 MG/DL (ref 74–99)
HCT VFR BLD CALC: 28.8 % (ref 34–48)
HEMOGLOBIN: 9.4 G/DL (ref 11.5–15.5)
MCH RBC QN AUTO: 32.5 PG (ref 26–35)
MCHC RBC AUTO-ENTMCNC: 32.6 % (ref 32–34.5)
MCV RBC AUTO: 99.7 FL (ref 80–99.9)
PDW BLD-RTO: 14.3 FL (ref 11.5–15)
PLATELET # BLD: 172 E9/L (ref 130–450)
PMV BLD AUTO: 10.6 FL (ref 7–12)
POTASSIUM SERPL-SCNC: 4.7 MMOL/L (ref 3.5–5)
RBC # BLD: 2.89 E12/L (ref 3.5–5.5)
SODIUM BLD-SCNC: 138 MMOL/L (ref 132–146)
TOTAL PROTEIN: 5.8 G/DL (ref 6.4–8.3)
WBC # BLD: 4.1 E9/L (ref 4.5–11.5)

## 2022-05-22 PROCEDURE — 85027 COMPLETE CBC AUTOMATED: CPT

## 2022-05-22 PROCEDURE — 80053 COMPREHEN METABOLIC PANEL: CPT

## 2022-05-22 PROCEDURE — 6370000000 HC RX 637 (ALT 250 FOR IP): Performed by: INTERNAL MEDICINE

## 2022-05-22 PROCEDURE — 36415 COLL VENOUS BLD VENIPUNCTURE: CPT

## 2022-05-22 PROCEDURE — 1200000000 HC SEMI PRIVATE

## 2022-05-22 PROCEDURE — 2500000003 HC RX 250 WO HCPCS: Performed by: INTERNAL MEDICINE

## 2022-05-22 RX ADMIN — CALCIUM CARBONATE-VITAMIN D TAB 500 MG-200 UNIT 1 TABLET: 500-200 TAB at 09:24

## 2022-05-22 RX ADMIN — SACUBITRIL AND VALSARTAN 1 TABLET: 24; 26 TABLET, FILM COATED ORAL at 20:26

## 2022-05-22 RX ADMIN — Medication 2000 UNITS: at 09:24

## 2022-05-22 RX ADMIN — BUMETANIDE 1 MG: 0.25 INJECTION, SOLUTION INTRAMUSCULAR; INTRAVENOUS at 09:24

## 2022-05-22 RX ADMIN — ATORVASTATIN CALCIUM 20 MG: 20 TABLET, FILM COATED ORAL at 20:25

## 2022-05-22 RX ADMIN — SACUBITRIL AND VALSARTAN 1 TABLET: 24; 26 TABLET, FILM COATED ORAL at 09:24

## 2022-05-22 RX ADMIN — ASPIRIN 81 MG: 81 TABLET, COATED ORAL at 09:24

## 2022-05-22 RX ADMIN — ACETAMINOPHEN 650 MG: 325 TABLET ORAL at 01:56

## 2022-05-22 RX ADMIN — GABAPENTIN 400 MG: 400 CAPSULE ORAL at 20:27

## 2022-05-22 ASSESSMENT — PAIN SCALES - GENERAL
PAINLEVEL_OUTOF10: 4
PAINLEVEL_OUTOF10: 0
PAINLEVEL_OUTOF10: 0

## 2022-05-22 ASSESSMENT — PAIN DESCRIPTION - LOCATION: LOCATION: LEG

## 2022-05-22 ASSESSMENT — ENCOUNTER SYMPTOMS
DIARRHEA: 0
SHORTNESS OF BREATH: 1
COUGH: 0

## 2022-05-22 ASSESSMENT — PAIN DESCRIPTION - ORIENTATION: ORIENTATION: RIGHT;LEFT

## 2022-05-22 NOTE — PROGRESS NOTES
Pharmacy Consultation Note  (Antibiotic Dosing and Monitoring)    Initial consult date: 5/19/22  Consulting physician/provider: Dr. Liliana Whitney  Drug: Vancomycin  Indication: SSTI    Age/  Gender Height Weight IBW  Allergy Information   89 y.o./female 5' 2\" (157.5 cm) 138 lb (62.6 kg)     Ideal body weight: 50.1 kg (110 lb 7.2 oz)  Adjusted ideal body weight: 55.1 kg (121 lb 7.5 oz)   Flagyl [metronidazole], Adhesive tape, and Penicillin g      Renal Function:  Recent Labs     05/20/22  0538 05/21/22  0537 05/22/22  0507   BUN 75* 79* 86*   CREATININE 1.9* 1.8* 1.9*       Intake/Output Summary (Last 24 hours) at 5/22/2022 1003  Last data filed at 5/22/2022 0757  Gross per 24 hour   Intake 1410 ml   Output --   Net 1410 ml       Vancomycin Monitoring:  Trough:  No results for input(s): VANCOTROUGH in the last 72 hours. Random:    Recent Labs     05/20/22  0538 05/21/22  1004   VANCORANDOM 13.3 18.7     Recent vancomycin administrations                   vancomycin (VANCOCIN) 750 mg in dextrose 5 % 250 mL IVPB (mg) 750 mg New Bag 05/21/22 2015    vancomycin (VANCOCIN) 750 mg in dextrose 5 % 250 mL IVPB (mg) 750 mg New Bag 05/20/22 1415                 Assessment:  · Patient is a 80 y.o. female who has been initiated on vancomycin for SSTI. · Estimated Creatinine Clearance: 16 mL/min (A) (based on SCr of 1.9 mg/dL (H)). Patient's baseline SCr is ~1.3-1.5.   · To dose vancomycin, pharmacy will be utilizing dosing based off of levels because of patient's renal impairment/insufficiency   · 5/19: Received Vancomycin 1,000 mg x 1 this morning  · 5/20: Random AM level @ 0538 = 13.3 mcg/mL  · 5/21: Random level @ 1004 = 18.7 mcg/mL    Plan:  · Vancomycin 750 mg IV given last evening  · No additional doses today  · Random level tomorrow with AM labs  · Will continue to monitor renal function   · Clinical pharmacy to follow    Duy Beyer, PharmD, BCPS 5/22/2022 10:03 AM   Ext: [id number]

## 2022-05-22 NOTE — PROGRESS NOTES
Progress Note  Date:2022       Room:0339/0339-01  Patient Rik Coombs     YOB: 1928     Age:89 y.o. Subjective    Subjective:  Symptoms:  Stable. She reports shortness of breath, malaise and weakness. No cough, chest pain, headache, chest pressure, anorexia, diarrhea or anxiety. Diet:  Adequate intake. Activity level: Impaired due to weakness. Pain:  She complains of pain that is mild. dw pt and think it wound be best to go to [facility] and iv abx and wound care and johnnie  Review of Systems   Respiratory: Positive for shortness of breath. Negative for cough. Cardiovascular: Negative for chest pain. Gastrointestinal: Negative for anorexia and diarrhea. Neurological: Positive for weakness. Objective         Vitals Last 24 Hours:  TEMPERATURE:  Temp  Av °F (36.7 °C)  Min: 97.6 °F (36.4 °C)  Max: 98.4 °F (36.9 °C)  RESPIRATIONS RANGE: Resp  Av  Min: 16  Max: 18  PULSE OXIMETRY RANGE: SpO2  Av.7 %  Min: 95 %  Max: 98 %  PULSE RANGE: Pulse  Av.3  Min: 62  Max: 66  BLOOD PRESSURE RANGE: Systolic (34EGF), MANUEL:650 , Min:102 , XII:420   ; Diastolic (85TCF), VEP:84, Min:47, Max:64    I/O (24Hr): Intake/Output Summary (Last 24 hours) at 2022 1121  Last data filed at 2022 0757  Gross per 24 hour   Intake 1410 ml   Output --   Net 1410 ml     Objective:  General Appearance:  Comfortable. Vital signs: (most recent): Blood pressure 106/64, pulse 62, temperature 98.4 °F (36.9 °C), temperature source Oral, resp. rate 16, height 5' 2\" (1.575 m), weight 138 lb (62.6 kg), SpO2 97 %. Vital signs are normal.    Output: Producing urine. Lungs:  Normal respiratory rate. Heart: Regular rhythm. S1 normal and S2 normal.    Abdomen: Abdomen is soft. Bowel sounds are normal.     Extremities: Normal range of motion. There is local swelling. Pulses: Distal pulses are intact.     Neurological: Patient is alert and oriented to person, place and time. Normal strength. Pupils:  Pupils are equal, round, and reactive to light. Skin:  There is ulceration. Labs/Imaging/Diagnostics    Labs:  CBC:  Recent Labs     05/20/22  0538 05/21/22  0537 05/22/22  0507   WBC 5.2 5.5 4.1*   RBC 3.06* 2.91* 2.89*   HGB 9.8* 9.2* 9.4*   HCT 30.8* 28.8* 28.8*   .7* 99.0 99.7   RDW 14.2 14.2 14.3    187 172     CHEMISTRIES:  Recent Labs     05/20/22  0538 05/21/22  0537 05/22/22  0507    139 138   K 4.7 4.6 4.7    103 101   CO2 24 25 25   BUN 75* 79* 86*   CREATININE 1.9* 1.8* 1.9*   GLUCOSE 98 95 91     PT/INR:No results for input(s): PROTIME, INR in the last 72 hours. APTT:No results for input(s): APTT in the last 72 hours. LIVER PROFILE:  Recent Labs     05/20/22  0538 05/21/22  0537 05/22/22  0507   AST 20 20 19   ALT 15 13 14   BILITOT <0.2 <0.2 <0.2   ALKPHOS 94 85 83       Imaging Last 24 Hours:  No results found. 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 ([address] 5/16/2022 Yes        Assessment:  (Problem List as of 5/22/2022 Reviewed: 12/6/2019  1:01 PM by Austen Alfaro MD    * (Principal) Cellulitis of lower extremity    Hyperlipidemia    Chronic back pain    Non-healing ulcer ([address]    CKD (chronic kidney disease) stage 3, GFR 30-59 ml/min (HCC)    Chronic diastolic heart failure (HCC)    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 Plan:   (Cont with treatment. Refer to [address]. Pt is agreeable to go there).        Electronically signed by Darron Sethi MD on 5/22/22 at 11:21 AM EDT

## 2022-05-22 NOTE — PROGRESS NOTES
Progress Note  Date:2022       Room:0339/0339-01  Patient Juice Florez     YOB: 1928     Age:89 y.o. Subjective    Subjective:  Symptoms:  Stable. She reports shortness of breath, malaise, weakness and anxiety. No cough, chest pain, headache, chest pressure, anorexia or diarrhea. Diet:  Adequate intake. Activity level: Impaired due to weakness. Pain:  She complains of pain that is mild. Review of Systems   Respiratory: Positive for shortness of breath. Negative for cough. Cardiovascular: Negative for chest pain. Gastrointestinal: Negative for anorexia and diarrhea. Neurological: Positive for weakness. Objective         Vitals Last 24 Hours:  TEMPERATURE:  Temp  Av.9 °F (36.6 °C)  Min: 97.8 °F (36.6 °C)  Max: 98 °F (36.7 °C)  RESPIRATIONS RANGE: Resp  Av  Min: 16  Max: 18  PULSE OXIMETRY RANGE: SpO2  Av.5 %  Min: 93 %  Max: 98 %  PULSE RANGE: Pulse  Av.5  Min: 66  Max: 69  BLOOD PRESSURE RANGE: Systolic (08JEF), DNO:476 , Min:102 , WWU:811   ; Diastolic (08XZH), LTQ:88, Min:47, Max:50    I/O (24Hr): Intake/Output Summary (Last 24 hours) at 2022 2230  Last data filed at 2022 1929  Gross per 24 hour   Intake 1220 ml   Output --   Net 1220 ml     Objective:  General Appearance:  Comfortable. Vital signs: (most recent): Blood pressure (!) 102/47, pulse 66, temperature 98 °F (36.7 °C), temperature source Oral, resp. rate 18, height 5' 2\" (1.575 m), weight 138 lb (62.6 kg), SpO2 98 %. Vital signs are normal.    Output: Producing urine. HEENT: Normal HEENT exam.    Lungs:  Normal respiratory rate. She is not in respiratory distress. Heart: Regular rhythm. S1 normal and S2 normal.    Abdomen: Abdomen is soft. Bowel sounds are normal.   There is no abdominal tenderness. Extremities: Normal range of motion. Pulses: Distal pulses are intact.     Neurological: Patient is alert and oriented to person, place and time.  Normal strength. Skin:  Warm and dry. Labs/Imaging/Diagnostics    Labs:  CBC:  Recent Labs     05/19/22  0514 05/20/22  0538 05/21/22  0537   WBC 5.6 5.2 5.5   RBC 2.98* 3.06* 2.91*   HGB 9.3* 9.8* 9.2*   HCT 29.5* 30.8* 28.8*   MCV 99.0 100.7* 99.0   RDW 14.5 14.2 14.2    200 187     CHEMISTRIES:  Recent Labs     05/19/22  0514 05/20/22  0538 05/21/22  0537    139 139   K 4.7 4.7 4.6    102 103   CO2 26 24 25   BUN 76* 75* 79*   CREATININE 2.1* 1.9* 1.8*   GLUCOSE 92 98 95     PT/INR:No results for input(s): PROTIME, INR in the last 72 hours. APTT:No results for input(s): APTT in the last 72 hours. LIVER PROFILE:  Recent Labs     05/19/22  0514 05/20/22  0538 05/21/22  0537   AST 20 20 20   ALT 15 15 13   BILITOT 0.2 <0.2 <0.2   ALKPHOS 82 94 85       Imaging Last 24 Hours:  No results found. 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 ([address] 5/16/2022 Yes        Assessment:  (Problem List as of 5/21/2022 Reviewed: 12/6/2019  1:01 PM by Alberto Arevalo MD    * (Principal) Cellulitis of lower extremity    Hyperlipidemia    Chronic back pain    Non-healing ulcer ([address]    CKD (chronic kidney disease) stage 3, GFR 30-59 ml/min (HCC)    Chronic diastolic heart failure (HCC)    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 Plan:   (Cont with treatment).        Electronically signed by Pedro Nieto MD on 5/21/22 at 10:30 PM EDT

## 2022-05-23 VITALS
TEMPERATURE: 98.4 F | SYSTOLIC BLOOD PRESSURE: 106 MMHG | DIASTOLIC BLOOD PRESSURE: 51 MMHG | HEIGHT: 62 IN | WEIGHT: 138 LBS | OXYGEN SATURATION: 95 % | BODY MASS INDEX: 25.4 KG/M2 | RESPIRATION RATE: 16 BRPM | HEART RATE: 68 BPM

## 2022-05-23 LAB
HCT VFR BLD CALC: 27.8 % (ref 34–48)
HEMOGLOBIN: 8.9 G/DL (ref 11.5–15.5)
MCH RBC QN AUTO: 31.9 PG (ref 26–35)
MCHC RBC AUTO-ENTMCNC: 32 % (ref 32–34.5)
MCV RBC AUTO: 99.6 FL (ref 80–99.9)
PDW BLD-RTO: 14.2 FL (ref 11.5–15)
PLATELET # BLD: 178 E9/L (ref 130–450)
PMV BLD AUTO: 10.5 FL (ref 7–12)
RBC # BLD: 2.79 E12/L (ref 3.5–5.5)
SARS-COV-2, NAAT: NOT DETECTED
VANCOMYCIN RANDOM: 20.5 MCG/ML (ref 5–40)
WBC # BLD: 4.2 E9/L (ref 4.5–11.5)

## 2022-05-23 PROCEDURE — 97530 THERAPEUTIC ACTIVITIES: CPT

## 2022-05-23 PROCEDURE — 87635 SARS-COV-2 COVID-19 AMP PRB: CPT

## 2022-05-23 PROCEDURE — 36415 COLL VENOUS BLD VENIPUNCTURE: CPT

## 2022-05-23 PROCEDURE — 6370000000 HC RX 637 (ALT 250 FOR IP): Performed by: INTERNAL MEDICINE

## 2022-05-23 PROCEDURE — 97535 SELF CARE MNGMENT TRAINING: CPT

## 2022-05-23 PROCEDURE — 80202 ASSAY OF VANCOMYCIN: CPT

## 2022-05-23 PROCEDURE — 2500000003 HC RX 250 WO HCPCS: Performed by: INTERNAL MEDICINE

## 2022-05-23 PROCEDURE — 97110 THERAPEUTIC EXERCISES: CPT

## 2022-05-23 PROCEDURE — 85027 COMPLETE CBC AUTOMATED: CPT

## 2022-05-23 RX ORDER — DOXYCYCLINE HYCLATE 100 MG
100 TABLET ORAL 2 TIMES DAILY
Qty: 20 TABLET | Refills: 0 | Status: SHIPPED | OUTPATIENT
Start: 2022-05-23 | End: 2022-06-02

## 2022-05-23 RX ORDER — BUMETANIDE 2 MG/1
1 TABLET ORAL DAILY
Qty: 30 TABLET | Refills: 3 | Status: SHIPPED | OUTPATIENT
Start: 2022-05-23

## 2022-05-23 RX ADMIN — SACUBITRIL AND VALSARTAN 1 TABLET: 24; 26 TABLET, FILM COATED ORAL at 08:53

## 2022-05-23 RX ADMIN — Medication 2000 UNITS: at 08:54

## 2022-05-23 RX ADMIN — BUMETANIDE 1 MG: 0.25 INJECTION, SOLUTION INTRAMUSCULAR; INTRAVENOUS at 08:53

## 2022-05-23 RX ADMIN — CALCIUM CARBONATE-VITAMIN D TAB 500 MG-200 UNIT 1 TABLET: 500-200 TAB at 08:53

## 2022-05-23 RX ADMIN — ASPIRIN 81 MG: 81 TABLET, COATED ORAL at 08:53

## 2022-05-23 RX ADMIN — ACETAMINOPHEN 650 MG: 325 TABLET ORAL at 01:15

## 2022-05-23 ASSESSMENT — PAIN SCALES - GENERAL: PAINLEVEL_OUTOF10: 5

## 2022-05-23 NOTE — PROGRESS NOTES
OT BEDSIDE TREATMENT NOTE      Date:2022  Patient Name: Samuel Wolf  MRN: [id number]  : 9/3/1928  Room: [address] [place] [address]       Evaluating OT: Evelia Soliz OTR/L; [id number]      Referring Provider and Specific Provider Orders/Date:      22   OT eval and treat  Start:  22,   End:  22, Layne Fabricio TIME,   Standing Count:  1 Occurrences,   R         Stevie Bob MD      Placement Recommendation: BETO vs. HHOT with supervision/assist        Diagnosis:   No diagnosis found. Surgery: none        Pertinent Medical History:       Past Medical History        Past Medical History:   Diagnosis Date    [facility] [address] [facility]      Cancer ([facility] [address]       melon[place] - back; left breast CA    Chronic back pain      CKD (chronic kidney disease) stage 3, GFR 30-59 ml/min ([facility])      Hyperlipidemia      Hypertension      Macular degeneration      Osteoarthritis      Restless legs syndrome              Past Surgical History         Past Surgical History:   Procedure Laterality Date    BREAST LUMPECTOMY        BREAST SURGERY Left 6/3/15     lumpectomy    CATARACT REMOVAL WITH IMPLANT Right 3 8 16    COLONOSCOPY   5/27/15    EYE SURGERY Right 2022     PARS PLANA VITRECTOMY, ENDO LASER, GAS FLUID EXCHANGE, 25 G, MAC, RIGHT EYE performed by Jenaro Elliott MD at [facility] with uterus suspension.  MOHS SURGERY              Precautions:  Fall Risk, LE cellulitis       Assessment of current deficits    [x]? Functional mobility            [x]?ADLs           [x]? Strength                   []?Cognition    [x]? Functional transfers          [x]? IADLs         []? Safety Awareness   [x]? Endurance    []? Fine Coordination              [x]? Balance      []? Vision/perception    []? Sensation      []? Gross Motor Coordination  []? ROM           []?  Delirium                   []? Motor Control      OT PLAN OF CARE   OT POC based on physician orders, patient diagnosis and results of clinical assessment     Frequency/Duration 1-3 days/wk for 2 weeks PRN      Specific OT Treatment Interventions to include:   * Instruction/training on adapted ADL techniques and AE recommendations to increase functional independence within precautions       * Training on energy conservation strategies, correct breathing pattern and techniques to improve independence/tolerance for self-care routine  * Functional transfer/mobility training/DME recommendations for increased independence, safety, and fall prevention  * Patient/Family education to increase follow through with safety techniques and functional independence  * Recommendation of environmental modifications for increased safety with functional transfers/mobility and ADLs  * Therapeutic exercise to improve motor endurance, ROM, and functional strength for ADLs/functional transfers  * Therapeutic activities to facilitate/challenge dynamic balance, stand tolerance for increased safety and independence with ADLs  * Positioning to improve skin integrity, interaction with environment and functional independence     Recommended Adaptive Equipment: tub transfer bench (patient will have family look into the bench)      Home Living: alone; single family home, 1 story, 3 steps to enter with rail, tub shower.        Equipment owned: wheeled walker, cane, wheelchair, elevated toilet seat with rails     Prior Level of Function: Independent with ADLs , Independent with IADLs; ambulated with wheeled walker, sponge bathes, going shopping with son occasionally and uses the wheelchair      Driving: no  Occupation: retired      Pain Level: unquantified pain in B LE's; Nursing aware     Cognition: A&O: 4/4; Follows 2 step directions              Memory: good               Sequencing: good               Problem solving: good               Judgement/safety: good      [facility]   AM-PAC Daily Activity Inpatient   How much help for putting on and taking off regular lower body clothing?: A Lot  How much help for Bathing?: A Lot  How much help for Toileting?: A Little  How much help for putting on and taking off regular upper body clothing?: A Little  How much help for taking care of personal grooming?: A Little  How much help for eating meals?: None  AM-[facility] Inpatient Daily Activity Raw Score: 17  AM-PAC Inpatient ADL T-Scale Score : 37.26  ADL Inpatient CMS 0-100% Score: 50.11  ADL Inpatient CMS G-Code Modifier : CK                Functional Assessment:     Initial Eval Status  Date: 5/17/22 Treatment Status  Date:5/23/22 STGs = LTGs  Time frame: 10-14 days   Feeding Independent  Independent to bring cup to mouth to rinse after oral hygiene when seated sinkside Independent    Grooming Supervision  Min A to comb back of hair with pick; pt able to remove and replace dentures when seated in chair at sinkside; min A for package management to open denture tablet; assist to brush dentures d/t decreased vision; pt able to wash face when seated in chair at sinkside; pt declined washing hair at this time  Independent    UB Dressing Minimal Assist  Min A to untie back of gown and assist in doffing gown over B UE's; pt able to don shirt when seated in chair ; pt had bra donned prior to session Independent    LB Dressing Moderate Assist  Mod A to tear brief at sides when pt stood sinkside; pt able to don underwear and pants when seated in chair by flexing anteriorly to don garments over B feet; assist for standing balance as pt donned garments over B hips; mod A to don/doff socks d/t B LE's wrapped; attempted to don shoes, however shoes would not fit d/t wrapping on B feet Independent    Bathing Moderate Assist Min/Mod A; pt able to wash UB and proximal LE's when seated in chair; pt will require assist to wash B feet when LE's unwrapped; assist for standing balance and cuing for hand placement as pt stood to complete pericare; assist to wash back when seated in chair and assist to wash buttocks when standing Independent    Toileting Supervision for hygiene and to stand at sink level to wash and dry hands.  N/T Independent    Bed Mobility  Supine to sit: N/T as pt was up in chair   Sit to supine: N/T as pt was returned to bedside chair  Pt seated in chair at beginning and end of session; all needs within reach; nsg aware pt in chair Supine to sit: Independent   Sit to supine: Independent    Functional Transfers Supervision from bedside chair to wheeled walker. Supervision for transfer to and from low commode with minimal verbal cues to use grab bar for safe commode transfer   Transfer training with verbal cues for hand placement throughout session to improve safety.   Min A progressing to supervision for sit to stand transfers to/from low chair; pt required 3 attempts for sit to stand from low chair initially d/t pt seated in chair for awhile prior to session; transfer to/from chair at sinkside x 3 reps with min A progressing to SBA for safety; use of FWW; transfer from sinkside chair to bedside chair Independent     Functional Mobility Minimal assist with wheeled walker to improve balance to and from bathroom, verbal cues for walker sequence and safety. Min A with FWW for short household distances from bedside chair <> sinkside chair   Modified [place]    Balance Sitting:     Static: good     Dynamic: fair   Standing: fair  with wheeled walker  Sitting:     Static: good     Dynamic: fair   Standing: fair  with wheeled walker     Activity Tolerance fair  Fair; pt states \"that was a workout\" [to complete ADL tasks]  good    Visual/  Perceptual Glasses: yes    Legally blind                     Hand Dominance: right      Hearing: WFL   Sensation:  No c/o numbness or tingling  Tone: WFL   Edema: yes, B LE's      Comments: Patient cleared by nursing staff. Upon arrival pt seated in chair. Pt agreeable to OT tx session.   Pt educated with regards to hand placement, safety awareness, static sitting balance,  standing balance, transfer training, functional mobility, ADL retraining,  grooming tasks, UE/LE bathing, LE/UE dressing, ECT's. At end of session pt seated in chair  with all lines and tubes intact, call light within reach. Overall, pt demonstrated decreased independence and safety during completion of ADL/functional transfers/mobility tasks. Pt would benefit from continued skilled OT to increase safety and independence with completion of ADL/IADL tasks for functional independence and quality of life. Pt required cues and education as noted above for safe facilitation and completion of tasks. Therapist provided skilled monitoring of patient's response during treatment session. Prior to and at the end of session, environmental modifications completed for patients safety and efficiency of treatment session. Overall, patient demonstrates minimal difficulties with completion of BADLs and IADLs. Factors contributing to these difficulties include arthritis in hands/shoulders, decreased endurance, and generalized weakness. As noted above, patient likely to benefit from further OT intervention to increase independence, safety, and overall quality of life. Treatment:     ? Functional transfers: Facilitated transfers to/from bedside chair and to/from chair at sinkside x 3 reps during bathing/dressing with cues for body alignment, safety and hand placement. ? ADL completion: Self-care retraining for the above-mentioned ADLs; training on proper hand placement, safety technique, sequencing, and energy conservation techniques. ? Postural Balance: Sitting/standing balance retraining to improve righting reactions with postural changes during ADLs.     · Pt has made fair progress towards set goals    · OT 1-3x/week for 5-7 days during hospitalization     Treatment Time also includes thorough review of current medical information, gathering information on past medical history/social history and prior level of function, informal observation of tasks, assessment of data and education on plan of care and goals.     Treatment Time In: 10:35 AM    Treatment Time Out: 11:18 AM            Treatment Charges: Mins Units   ADL/Home Mgt     [id number]   Thera Activities     [id number] 10 1   Ther Ex                 [id number]       Manual Therapy    [id number]     Neuro Re-ed         [id number]     Orthotic manage/training                               [id number]     Non Billable Time     Total Timed Treatment 43 1 [facility], RIKKI/L #[id number]

## 2022-05-23 NOTE — PROGRESS NOTES
Pharmacy Consultation Note  (Antibiotic Dosing and Monitoring)    Initial consult date: 5/19/22  Consulting physician/provider: Dr. Brooks Anaya  Drug: Vancomycin  Indication: SSTI    Age/  Gender Height Weight IBW  Allergy Information   89 y.o./female 5' 2\" (157.5 cm) 138 lb (62.6 kg)     Ideal body weight: 50.1 kg (110 lb 7.2 oz)  Adjusted ideal body weight: 55.1 kg (121 lb 7.5 oz)   Flagyl [metronidazole], Adhesive tape, and Penicillin g      Renal Function:  Recent Labs     05/21/22  0537 05/22/22  0507   BUN 79* 86*   CREATININE 1.8* 1.9*       Intake/Output Summary (Last 24 hours) at 5/23/2022 1347  Last data filed at 5/23/2022 0911  Gross per 24 hour   Intake 500 ml   Output --   Net 500 ml       Vancomycin Monitoring:  Trough:  No results for input(s): VANCOTROUGH in the last 72 hours. Random:    Recent Labs     05/21/22  1004 05/23/22  0510   VANCORANDOM 18.7 20.5     Recent vancomycin administrations                   vancomycin (VANCOCIN) 750 mg in dextrose 5 % 250 mL IVPB (mg) 750 mg New Bag 05/21/22 2015    vancomycin (VANCOCIN) 750 mg in dextrose 5 % 250 mL IVPB (mg) 750 mg New Bag 05/20/22 1415                    Assessment:  · Patient is a 80 y.o. female who has been initiated on vancomycin for SSTI. · Estimated Creatinine Clearance: 16 mL/min (A) (based on SCr of 1.9 mg/dL (H)). Patient's baseline SCr is ~1.3-1.5.   · To dose vancomycin, pharmacy will be utilizing dosing based off of levels because of patient's renal impairment/insufficiency   · 5/19: Received Vancomycin 1,000 mg x 1 this morning  · 5/20: Random AM level @ 0538 = 13.3 mcg/mL  · 5/21: Random level @ 1004 = 18.7 mcg/mL  · 5/23: Random AM level = 20.5 mcg/mL    Plan:  · No dose today  · Random level tomorrow with AM labs  · Will continue to monitor renal function   · Clinical pharmacy to follow    Caridad Esparza PharmD, BCPS 5/23/2022 1:47 PM   Ext: 1424

## 2022-05-23 NOTE — CARE COORDINATION
CM note: Met with patient after IDR as patient has decided to go to [facility] per Dr. Lydia Greene note. Patient confirmed plan and a referral was placed with [facility]. Await review, will NOT NEED PRE-CERT, however may need OT note for liaison to complete a review.

## 2022-05-23 NOTE — PROGRESS NOTES
Comprehensive Nutrition Assessment    Type and Reason for Visit:  Wound,RD Nutrition Re-Screen/LOS    Nutrition Recommendations/Plan:   1. Pt continues to consume >75% of meals, no further recs at this time, will monitor. Malnutrition Assessment:  Malnutrition Status:  No malnutrition (05/23/22 0954)    Context:  Acute Illness     Findings of the 6 clinical characteristics of malnutrition:  Energy Intake:  No significant decrease in energy intake (Pt reports good po intakes >75%)  Weight Loss:  No significant weight loss     Body Fat Loss:  Mild body fat loss Triceps   Muscle Mass Loss:  No significant muscle mass loss    Fluid Accumulation:  No significant fluid accumulation     Strength:  Not Performed    Nutrition Assessment:    Pt admits w/ edema/LLE cellulitis/nonhealing ulcers w/ CHF. Hx BreastCA/CKD3. Pt tolerating diet, w/ po intakes >75%. Pt declines ONS at this time, will continue to monitor. Nutrition Related Findings:    A/o, [place], dentures, abd soft, +BS, +1RLE/LLE edema. +I&Os, Cr (H) Wound Type: Full Thickness       Current Nutrition Intake & Therapies:    Average Meal Intake: % (avg ~70%)  Average Supplements Intake: None Ordered  ADULT DIET; Regular    Anthropometric Measures:  Height: 5' 2\" (157.5 cm)  Ideal Body Weight (IBW): 110 lbs (50 kg)    Current Body Weight: 138 lb (62.6 kg) (5/18 no method, UTO updated measured wt), 125.5 % IBW. Current BMI (kg/m2): 25.2  Usual Body Weight:  (No wt hx per pt reports ~138#)  Weight Adjustment For: No Adjustment  BMI Categories: Overweight (BMI 25.0-29. 9)    Nutrition Diagnosis:   · Increased nutrient needs related to increase demand for energy/nutrients as evidenced by wounds,intake 51-75%    Nutrition Interventions:   Food and/or Nutrient Delivery: Continue Current Diet (Pt declines ONS)  Nutrition Education/Counseling: No recommendation at this time  Coordination of Nutrition Care: Continue to monitor while inpatient    Goals:  Goals: PO intake 75% or greater    Nutrition Monitoring and Evaluation:   Behavioral-Environmental Outcomes: None Identified  Food/Nutrient Intake Outcomes: Food and Nutrient Intake  Physical Signs/Symptoms Outcomes: Biochemical Data,Nutrition Focused Physical Findings,Skin,Weight,Chewing or Swallowing,Fluid Status or Edema,GI Status    Discharge Planning:     Too soon to determine     [employer]: [id number]

## 2022-05-23 NOTE — CARE COORDINATION
CM note: Patient has been accepted at [facility] and can discharge there this afternoon. Spoke with patient's son, Tereso Sever, he is not feeling well so he is requesting transportation be set up for her. CM arranged ambulette with [facility] for 3pm pending that son calls in payment. Facility liaison notified. Covid test has been ordered.

## 2022-06-21 NOTE — DISCHARGE SUMMARY
Discharge Summary    Date: 6/20/2022  Patient Name: Kimberly Garg YOB: 1928 Age: 80 y.o. 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 [facility] Stay  Narrative of Hospital Course:  Pt came in non healing ulcer. During hospital course the wound did grow mrsa. Pt also has acute diastolic heart failure. She was put on entressto and iv bumesx    Consultants:  IP CONSULT TO PHARMACY    Surgeries/procedures Performed:       Treatments:    Antibiotics, Cardiac Medications and Therapies    Vancomycin, Other and Furosemide, PT and OT    Discharge Plan/Disposition:  To [facility]/Incidental Findings Requiring Follow Up:    Patient Instructions:    Diet: Cardiac Diet    Activity:Activity as Tolerated  For number of days (if applicable): Other Instructions:    Provider Follow-Up:   No follow-ups on file. Significant Diagnostic Studies:    Recent Labs:  Admission on 05/16/2022, Discharged on 05/23/2022Organism                                      Date: 05/16/2022Value: Staphylococcus aureus                     Status: FinalWOUND/ABSCESS                                 Date: 05/16/2022Value:               Status: Final                 Value:Heavy growthMethicillin resistant Staph aureus isolated. Most Methicillinresistant Staphylococcus are usually resistant to multipleantibiotics including other B-Lactams, Aminoglycosides,Macrolides, Clindamycin and Tetracycline. Contact isolationis indicated. Sodium Date: 05/17/2022Value: 140         Ref range: 132 - 146 mmol/L   Status: FinalPotassium                                     Date: 05/17/2022Value: 5.1*        Ref range: 3.5 - 5.0 mmol/L   Status: FinalChloride                                      Date: 05/17/2022Value: 105         Ref range: 98 - 107 mmol/L    Status: FinalCO2                                           Date: 05/17/2022Value: 24          Ref range: 22 - 29 mmol/L     Status: FinalAnion Gap                                     Date: 05/17/2022Value: 11          Ref range: 7 - 16 mmol/L      Status: FinalGlucose                                       Date: 05/17/2022Value: 88          Ref range: 74 - 99 mg/dL      Status: FinalBUN                                           Date: 05/17/2022Value: 76*         Ref range: 6 - 23 mg/dL       Status: FinalCREATININE                                    Date: 05/17/2022Value: 2.3*        Ref range: 0.5 - 1.0 mg/dL    Status: FinalGFR Non-                      Date: 05/17/2022Value: 20          Ref range: >=60 mL/min/1.73   Status: Final              Comment: Chronic Kidney Disease: less than 60 ml/min/1.73 sq.m. Kidney Failure: less than 15 ml/min/1.73 sq. m. Results valid for patients 18 years and older. GFR                           Date: 05/17/2022Value: 24            Status: FinalCalcium                                       Date: 05/17/2022Value: 9.2         Ref range: 8.6 - 10.2 mg/dL   Status: FinalTotal Protein                                 Date: 05/17/2022Value: 6.0*        Ref range: 6.4 - 8.3 g/dL     Status: FinalAlbumin                                       Date: 05/17/2022Value: 3.2*        Ref range: 3.5 - 5.2 g/dL     Status: FinalTotal Bilirubin                               Date: 05/17/2022Value: <0.2        Ref range: 0.0 - 1.2 mg/dL    Status: FinalAlkaline Phosphatase                          Date: 05/17/2022Value: 80 Ref range: 35 - 104 U/L       Status: FinalALT                                           Date: 05/17/2022Value: 15          Ref range: 0 - 32 U/L         Status: FinalAST                                           Date: 05/17/2022Value: 21          Ref range: 0 - 31 U/L         Status: [address] [facility]                                           Date: 05/17/2022Value: 5.1         Ref range: 4.5 - 11.5 E9/L    Status: FinalRBC                                           Date: 05/17/2022Value: 2.83*       Ref range: 3.50 - 5.50 E12/L  Status: FinalHemoglobin                                    Date: 05/17/2022Value: 9.0*        Ref range: 11.5 - 15.5 g/dL   Status: FinalHematocrit                                    Date: 05/17/2022Value: 28.6*       Ref range: 34.0 - 48.0 %      Status: FinalMCV                                           Date: 05/17/2022Value: 101.1*      Ref range: 80.0 - 99.9 fL     Status: [address]                                           Date: 05/17/2022Value: 31.8        Ref range: 26.0 - 35.0 pg     Status: [address]                                          Date: 05/17/2022Value: 31.5*       Ref range: 32.0 - 34.5 %      Status: FinalRDW                                           Date: 05/17/2022Value: 14.6        Ref range: 11.5 - 15.0 fL     Status: FinalPlatelets                                     Date: 05/17/2022Value: 179         Ref range: 130 - 450 E9/L     Status: FinalMPV                                           Date: 05/17/2022Value: 10.5        Ref range: 7.0 - 12.0 fL      Status: FinalLeft Ventricular Ejection Fraction            Date: 05/17/2022Value: 77            Status: Final-EditedLVEF MODALITY                                 Date: 05/17/2022Value: ECHO          Status: Final-EditedSodium                                        Date: 05/18/2022Value: 140         Ref range: 132 - 146 mmol/L   Status: FinalPotassium                                     Date: 05/18/2022Value: 5.0         Ref range: 3.5 - 5.0 mmol/L   Status: FinalChloride                                      Date: 05/18/2022Value: 104         Ref range: 98 - 107 mmol/L    Status: FinalCO2                                           Date: 05/18/2022Value: 25          Ref range: 22 - 29 mmol/L     Status: FinalAnion Gap                                     Date: 05/18/2022Value: 11          Ref range: 7 - 16 mmol/L      Status: FinalGlucose                                       Date: 05/18/2022Value: 89          Ref range: 74 - 99 mg/dL      Status: FinalBUN                                           Date: 05/18/2022Value: 72*         Ref range: 6 - 23 mg/dL       Status: FinalCREATININE                                    Date: 05/18/2022Value: 2.2*        Ref range: 0.5 - 1.0 mg/dL    Status: FinalGFR Non-                      Date: 05/18/2022Value: 21          Ref range: >=60 mL/min/1.73   Status: Final              Comment: Chronic Kidney Disease: less than 60 ml/min/1.73 sq.m. Kidney Failure: less than 15 ml/min/1.73 sq. m. Results valid for patients 18 years and older. GFR                           Date: 05/18/2022Value: 25            Status: FinalCalcium                                       Date: 05/18/2022Value: 9.5         Ref range: 8.6 - 10.2 mg/dL   Status: FinalTotal Protein                                 Date: 05/18/2022Value: 6.3*        Ref range: 6.4 - 8.3 g/dL     Status: FinalAlbumin                                       Date: 05/18/2022Value: 3.3*        Ref range: 3.5 - 5.2 g/dL     Status: FinalTotal Bilirubin                               Date: 05/18/2022Value: 0.2         Ref range: 0.0 - 1.2 mg/dL    Status: FinalAlkaline Phosphatase                          Date: 05/18/2022Value: 86          Ref range: 35 - 104 U/L       Status: FinalALT                                           Date: 05/18/2022Value: 16          Ref range: 0 - 32 U/L         Status: FinalAST Date: 05/18/2022Value: 21          Ref range: 0 - 31 U/L         Status: [address] [facility]                                           Date: 05/18/2022Value: 5.5         Ref range: 4.5 - 11.5 E9/L    Status: FinalRBC                                           Date: 05/18/2022Value: 3.00*       Ref range: 3.50 - 5.50 E12/L  Status: FinalHemoglobin                                    Date: 05/18/2022Value: 9.5*        Ref range: 11.5 - 15.5 g/dL   Status: FinalHematocrit                                    Date: 05/18/2022Value: 30.1*       Ref range: 34.0 - 48.0 %      Status: FinalMCV                                           Date: 05/18/2022Value: 100.3*      Ref range: 80.0 - 99.9 fL     Status: [address]                                           Date: 05/18/2022Value: 31.7        Ref range: 26.0 - 35.0 pg     Status: [address]                                          Date: 05/18/2022Value: 31.6*       Ref range: 32.0 - 34.5 %      Status: FinalRDW                                           Date: 05/18/2022Value: 14.5        Ref range: 11.5 - 15.0 fL     Status: FinalPlatelets                                     Date: 05/18/2022Value: 200         Ref range: 130 - 450 E9/L     Status: FinalMPV                                           Date: 05/18/2022Value: 10.5        Ref range: 7.0 - 12.0 fL      Status: FinalSodium                                        Date: 05/19/2022Value: 138         Ref range: 132 - 146 mmol/L   Status: FinalPotassium                                     Date: 05/19/2022Value: 4.7         Ref range: 3.5 - 5.0 mmol/L   Status: FinalChloride                                      Date: 05/19/2022Value: 101         Ref range: 98 - 107 mmol/L    Status: FinalCO2                                           Date: 05/19/2022Value: 26          Ref range: 22 - 29 mmol/L     Status: FinalAnion Gap                                     Date: 05/19/2022Value: 11          Ref range: 7 - 16 mmol/L Status: FinalGlucose                                       Date: 05/19/2022Value: 92          Ref range: 74 - 99 mg/dL      Status: FinalBUN                                           Date: 05/19/2022Value: 76*         Ref range: 6 - 23 mg/dL       Status: FinalCREATININE                                    Date: 05/19/2022Value: 2.1*        Ref range: 0.5 - 1.0 mg/dL    Status: FinalGFR Non-                      Date: 05/19/2022Value: 22          Ref range: >=60 mL/min/1.73   Status: Final              Comment: Chronic Kidney Disease: less than 60 ml/min/1.73 sq.m. Kidney Failure: less than 15 ml/min/1.73 sq. m. Results valid for patients 18 years and older. GFR                           Date: 05/19/2022Value: 27            Status: FinalCalcium                                       Date: 05/19/2022Value: 9.4         Ref range: 8.6 - 10.2 mg/dL   Status: FinalTotal Protein                                 Date: 05/19/2022Value: 6.0*        Ref range: 6.4 - 8.3 g/dL     Status: FinalAlbumin                                       Date: 05/19/2022Value: 3.2*        Ref range: 3.5 - 5.2 g/dL     Status: FinalTotal Bilirubin                               Date: 05/19/2022Value: 0.2         Ref range: 0.0 - 1.2 mg/dL    Status: FinalAlkaline Phosphatase                          Date: 05/19/2022Value: 82          Ref range: 35 - 104 U/L       Status: FinalALT                                           Date: 05/19/2022Value: 15          Ref range: 0 - 32 U/L         Status: FinalAST                                           Date: 05/19/2022Value: 20          Ref range: 0 - 31 U/L         Status: [address] [facility]                                           Date: 05/19/2022Value: 5.6         Ref range: 4.5 - 11.5 E9/L    Status: FinalRBC                                           Date: 05/19/2022Value: 2.98*       Ref range: 3.50 - 5.50 E12/L  Status: FinalHemoglobin                                    Date: 05/19/2022Value: 9.3*        Ref range: 11.5 - 15.5 g/dL   Status: FinalHematocrit                                    Date: 05/19/2022Value: 29.5*       Ref range: 34.0 - 48.0 %      Status: FinalMCV                                           Date: 05/19/2022Value: 99.0        Ref range: 80.0 - 99.9 fL     Status: [address]                                           Date: 05/19/2022Value: 31.2        Ref range: 26.0 - 35.0 pg     Status: [address]                                          Date: 05/19/2022Value: 31.5*       Ref range: 32.0 - 34.5 %      Status: FinalRDW                                           Date: 05/19/2022Value: 14.5        Ref range: 11.5 - 15.0 fL     Status: FinalPlatelets                                     Date: 05/19/2022Value: 190         Ref range: 130 - 450 E9/L     Status: FinalMPV                                           Date: 05/19/2022Value: 10.8        Ref range: 7.0 - 12.0 fL      Status: FinalSodium                                        Date: 05/20/2022Value: 139         Ref range: 132 - 146 mmol/L   Status: FinalPotassium                                     Date: 05/20/2022Value: 4.7         Ref range: 3.5 - 5.0 mmol/L   Status: FinalChloride                                      Date: 05/20/2022Value: 102         Ref range: 98 - 107 mmol/L    Status: FinalCO2                                           Date: 05/20/2022Value: 24          Ref range: 22 - 29 mmol/L     Status: FinalAnion Gap                                     Date: 05/20/2022Value: 13          Ref range: 7 - 16 mmol/L      Status: FinalGlucose                                       Date: 05/20/2022Value: 98          Ref range: 74 - 99 mg/dL      Status: FinalBUN                                           Date: 05/20/2022Value: 75*         Ref range: 6 - 23 mg/dL       Status: FinalCREATININE                                    Date: 05/20/2022Value: 1.9*        Ref range: 0.5 - 1.0 mg/dL    Status: FinalGFR Non- American                      Date: 05/20/2022Value: 25          Ref range: >=60 mL/min/1.73   Status: Final              Comment: Chronic Kidney Disease: less than 60 ml/min/1.73 sq.m. Kidney Failure: less than 15 ml/min/1.73 sq. m. Results valid for patients 18 years and older. GFR                           Date: 05/20/2022Value: 30            Status: FinalCalcium                                       Date: 05/20/2022Value: 9.4         Ref range: 8.6 - 10.2 mg/dL   Status: FinalTotal Protein                                 Date: 05/20/2022Value: 6.3*        Ref range: 6.4 - 8.3 g/dL     Status: FinalAlbumin                                       Date: 05/20/2022Value: 3.1*        Ref range: 3.5 - 5.2 g/dL     Status: FinalTotal Bilirubin                               Date: 05/20/2022Value: <0.2        Ref range: 0.0 - 1.2 mg/dL    Status: FinalAlkaline Phosphatase                          Date: 05/20/2022Value: 94          Ref range: 35 - 104 U/L       Status: FinalALT                                           Date: 05/20/2022Value: 15          Ref range: 0 - 32 U/L         Status: FinalAST                                           Date: 05/20/2022Value: 20          Ref range: 0 - 31 U/L         Status: [address] [facility]                                           Date: 05/20/2022Value: 5.2         Ref range: 4.5 - 11.5 E9/L    Status: FinalRBC                                           Date: 05/20/2022Value: 3.06*       Ref range: 3.50 - 5.50 E12/L  Status: FinalHemoglobin                                    Date: 05/20/2022Value: 9.8*        Ref range: 11.5 - 15.5 g/dL   Status: FinalHematocrit                                    Date: 05/20/2022Value: 30.8*       Ref range: 34.0 - 48.0 %      Status: FinalMCV                                           Date: 05/20/2022Value: 100.7*      Ref range: 80.0 - 99.9 fL     Status: [address]                                           Date: 05/20/2022Value: 32.0 Ref range: 26.0 - 35.0 pg     Status: [address]                                          Date: 05/20/2022Value: 31.8*       Ref range: 32.0 - 34.5 %      Status: FinalRDW                                           Date: 05/20/2022Value: 14.2        Ref range: 11.5 - 15.0 fL     Status: FinalPlatelets                                     Date: 05/20/2022Value: 200         Ref range: 130 - 450 E9/L     Status: FinalMPV                                           Date: 05/20/2022Value: 10.7        Ref range: 7.0 - 12.0 fL      Status: FinalVancomycin Rm                                 Date: 05/20/2022Value: 13.3        Ref range: 5.0 - 40.0 mcg/mL  Status: FinalSodium                                        Date: 05/21/2022Value: 139         Ref range: 132 - 146 mmol/L   Status: FinalPotassium                                     Date: 05/21/2022Value: 4.6         Ref range: 3.5 - 5.0 mmol/L   Status: FinalChloride                                      Date: 05/21/2022Value: 103         Ref range: 98 - 107 mmol/L    Status: FinalCO2                                           Date: 05/21/2022Value: 25          Ref range: 22 - 29 mmol/L     Status: FinalAnion Gap                                     Date: 05/21/2022Value: 11          Ref range: 7 - 16 mmol/L      Status: FinalGlucose                                       Date: 05/21/2022Value: 95          Ref range: 74 - 99 mg/dL      Status: FinalBUN                                           Date: 05/21/2022Value: 79*         Ref range: 6 - 23 mg/dL       Status: FinalCREATININE                                    Date: 05/21/2022Value: 1.8*        Ref range: 0.5 - 1.0 mg/dL    Status: FinalGFR Non-                      Date: 05/21/2022Value: 26          Ref range: >=60 mL/min/1.73   Status: Final              Comment: Chronic Kidney Disease: less than 60 ml/min/1.73 sq.m. Kidney Failure: less than 15 ml/min/1.73 sq. m. Results valid for patients 18 years and older.GFR                           Date: 05/21/2022Value: 32            Status: FinalCalcium                                       Date: 05/21/2022Value: 9.1         Ref range: 8.6 - 10.2 mg/dL   Status: FinalTotal Protein                                 Date: 05/21/2022Value: 5.8*        Ref range: 6.4 - 8.3 g/dL     Status: FinalAlbumin                                       Date: 05/21/2022Value: 2.9*        Ref range: 3.5 - 5.2 g/dL     Status: FinalTotal Bilirubin                               Date: 05/21/2022Value: <0.2        Ref range: 0.0 - 1.2 mg/dL    Status: FinalAlkaline Phosphatase                          Date: 05/21/2022Value: 85          Ref range: 35 - 104 U/L       Status: FinalALT                                           Date: 05/21/2022Value: 13          Ref range: 0 - 32 U/L         Status: FinalAST                                           Date: 05/21/2022Value: 20          Ref range: 0 - 31 U/L         Status: [address] [facility]                                           Date: 05/21/2022Value: 5.5         Ref range: 4.5 - 11.5 E9/L    Status: FinalRBC                                           Date: 05/21/2022Value: 2.91*       Ref range: 3.50 - 5.50 E12/L  Status: FinalHemoglobin                                    Date: 05/21/2022Value: 9.2*        Ref range: 11.5 - 15.5 g/dL   Status: FinalHematocrit                                    Date: 05/21/2022Value: 28.8*       Ref range: 34.0 - 48.0 %      Status: FinalMCV                                           Date: 05/21/2022Value: 99.0        Ref range: 80.0 - 99.9 fL     Status: [address]                                           Date: 05/21/2022Value: 31.6        Ref range: 26.0 - 35.0 pg     Status: [address]                                          Date: 05/21/2022Value: 31.9*       Ref range: 32.0 - 34.5 %      Status: FinalRDW                                           Date: 05/21/2022Value: 14.2        Ref range: 11.5 - 15.0 fL Status: FinalPlatelets                                     Date: 05/21/2022Value: 187         Ref range: 130 - 450 E9/L     Status: FinalMPV                                           Date: 05/21/2022Value: 10.4        Ref range: 7.0 - 12.0 fL      Status: FinalVancomycin Rm                                 Date: 05/21/2022Value: 18.7        Ref range: 5.0 - 40.0 mcg/mL  Status: FinalSodium                                        Date: 05/22/2022Value: 138         Ref range: 132 - 146 mmol/L   Status: FinalPotassium                                     Date: 05/22/2022Value: 4.7         Ref range: 3.5 - 5.0 mmol/L   Status: FinalChloride                                      Date: 05/22/2022Value: 101         Ref range: 98 - 107 mmol/L    Status: FinalCO2                                           Date: 05/22/2022Value: 25          Ref range: 22 - 29 mmol/L     Status: FinalAnion Gap                                     Date: 05/22/2022Value: 12          Ref range: 7 - 16 mmol/L      Status: FinalGlucose                                       Date: 05/22/2022Value: 91          Ref range: 74 - 99 mg/dL      Status: FinalBUN                                           Date: 05/22/2022Value: 86*         Ref range: 6 - 23 mg/dL       Status: FinalCREATININE                                    Date: 05/22/2022Value: 1.9*        Ref range: 0.5 - 1.0 mg/dL    Status: FinalGFR Non-                      Date: 05/22/2022Value: 25          Ref range: >=60 mL/min/1.73   Status: Final              Comment: Chronic Kidney Disease: less than 60 ml/min/1.73 sq.m. Kidney Failure: less than 15 ml/min/1.73 sq. m. Results valid for patients 18 years and older. GFR                           Date: 05/22/2022Value: 30            Status: FinalCalcium                                       Date: 05/22/2022Value: 9.2         Ref range: 8.6 - 10.2 mg/dL   Status: FinalTotal Protein                                 Date: 05/22/2022Value: 5.8*        Ref range: 6.4 - 8.3 g/dL     Status: FinalAlbumin                                       Date: 05/22/2022Value: 3.0*        Ref range: 3.5 - 5.2 g/dL     Status: FinalTotal Bilirubin                               Date: 05/22/2022Value: <0.2        Ref range: 0.0 - 1.2 mg/dL    Status: FinalAlkaline Phosphatase                          Date: 05/22/2022Value: 83          Ref range: 35 - 104 U/L       Status: FinalALT                                           Date: 05/22/2022Value: 14          Ref range: 0 - 32 U/L         Status: FinalAST                                           Date: 05/22/2022Value: 19          Ref range: 0 - 31 U/L         Status: [address] [place] [address]                                           Date: 05/22/2022Value: 4.1*        Ref range: 4.5 - 11.5 E9/L    Status: FinalRBC                                           Date: 05/22/2022Value: 2.89*       Ref range: 3.50 - 5.50 E12/L  Status: FinalHemoglobin                                    Date: 05/22/2022Value: 9.4*        Ref range: 11.5 - 15.5 g/dL   Status: FinalHematocrit                                    Date: 05/22/2022Value: 28.8*       Ref range: 34.0 - 48.0 %      Status: FinalMCV                                           Date: 05/22/2022Value: 99.7        Ref range: 80.0 - 99.9 fL     Status: [address]                                           Date: 05/22/2022Value: 32.5        Ref range: 26.0 - 35.0 pg     Status: [address]                                          Date: 05/22/2022Value: 32.6        Ref range: 32.0 - 34.5 %      Status: FinalRDW                                           Date: 05/22/2022Value: 14.3        Ref range: 11.5 - 15.0 fL     Status: FinalPlatelets                                     Date: 05/22/2022Value: 172         Ref range: 130 - 450 E9/L     Status: FinalMPV                                           Date: 05/22/2022Value: 10.6        Ref range: 7.0 - 12.0 fL      Status: [address] [place] [address] Date: 05/23/2022Value: 4.2*        Ref range: 4.5 - 11.5 E9/L    Status: FinalRBC                                           Date: 05/23/2022Value: 2.79*       Ref range: 3.50 - 5.50 E12/L  Status: FinalHemoglobin                                    Date: 05/23/2022Value: 8.9*        Ref range: 11.5 - 15.5 g/dL   Status: FinalHematocrit                                    Date: 05/23/2022Value: 27.8*       Ref range: 34.0 - 48.0 %      Status: FinalMCV                                           Date: 05/23/2022Value: 99.6        Ref range: 80.0 - 99.9 fL     Status: [address]                                           Date: 05/23/2022Value: 31.9        Ref range: 26.0 - 35.0 pg     Status: [address]                                          Date: 05/23/2022Value: 32.0        Ref range: 32.0 - 34.5 %      Status: FinalRDW                                           Date: 05/23/2022Value: 14.2        Ref range: 11.5 - 15.0 fL     Status: FinalPlatelets                                     Date: 05/23/2022Value: 178         Ref range: 130 - 450 E9/L     Status: FinalMPV                                           Date: 05/23/2022Value: 10.5        Ref range: 7.0 - 12.0 fL      Status: FinalVancomycin Rm                                 Date: 05/23/2022Value: 20.5        Ref range: 5.0 - 40.0 mcg/mL  Status: [id number]-VpB-1, NAAT                              Date: 05/23/2022Value: Not Detected                   Ref range: Not Detected       Status: Final              Comment: Rapid NAAT:   Negative results should be treated as presumptive and,if inconsistent with clinical signs and symptoms or necessary forpatient management, should be tested with an alternative molecularassay. Negative results do not preclude SARS-CoV-2 infection andshould not be used as the sole basis for patient management decisions. This test has been authorized by the FDA under an Emergency UseAuthorization (EUA) for use by authorized laboratories. Fact sheet for [facility].nz sheet for Patients: Robyn.dk: Isothermal Nucleic Acid Amplification------------    Radiology last 7 days:  No results found. [unfilled]    Discharge Medications    Discharge Medication List as of 5/23/2022  9:53 AMSTART taking these medicationssacubitril-valsartan (ENTRESTO) 24-26 MG per tabletTake 1 tablet by mouth 2 times daily, Disp-60 tablet, R-5Normalbumetanide (BUMEX) 2 MG tabletTake 0.5 tablets by mouth daily, Disp-30 tablet, R-3Normaldoxycycline hyclate (VIBRA-TABS) 100 MG tabletTake 1 tablet by mouth 2 times daily for 10 days, Disp-20 tablet, R-0Normal    Discharge Medication List as of 5/23/2022  9:53 AM    Discharge Medication List as of 5/23/2022  9:53 AMCONTINUE these medications which have NOT CHANGEDgabapentin (NEURONTIN) 400 MG capsuleTake 400 mg by mouth nightly as needed. Historical MedCholecalciferol (VITAMIN D) 2000 UNITS CAPS capsuleTake by mouth dailyCalcium Carb-Cholecalciferol (CALCIUM 600 + D PO)Take by mouthatorvastatin (LIPITOR) 20 MG tabletTake 20 mg by mouth dailyacetaminophen (TYLENOL) 325 MG tabletTake 650 mg by mouth every 6 hours as needed for PainAspirin (ASPIR-81 PO)Take by mouth Follow Dr. Arechiga Held anti-coagulant ordersHistorical Med    Discharge Medication List as of 5/23/2022  9:53 AMSTOP taking these medicationsolmesartan-hydrochlorothiazide (BENICAR HCT) 40-12.5 MG per tabletComments:Reason for Stopping:Misc Natural Products (ENERGY FOCUS PO)Comments:Reason for Stopping:    Time Spent on Discharge:E] minutes were spent in patient examination, evaluation, counseling as well as medication reconciliation, prescriptions for required medications, discharge plan, and follow up.     Electronically signed by Pedro Nieto MD on 6/20/22 at 10:40 PM EDT

## 2022-08-29 ENCOUNTER — TELEPHONE (OUTPATIENT)
Dept: INFUSION THERAPY | Age: 87
End: 2022-08-29

## 2022-08-29 DIAGNOSIS — Z12.31 SCREENING MAMMOGRAM FOR HIGH-RISK PATIENT: Primary | ICD-10-CM

## 2022-08-29 NOTE — TELEPHONE ENCOUNTER
3414- patient called wants to have a mammogram ordered and then needs an appointment to see Dr. Ariella Jones- instructed I will talk to NP and call the patient back at [phone].

## 2022-10-13 ENCOUNTER — HOSPITAL ENCOUNTER (OUTPATIENT)
Dept: MAMMOGRAPHY | Age: 87
Discharge: HOME | End: 2022-10-15
Payer: MEDICARE

## 2022-10-13 DIAGNOSIS — Z12.31 SCREENING MAMMOGRAM FOR HIGH-RISK PATIENT: ICD-10-CM

## 2022-10-13 PROCEDURE — 77067 SCR MAMMO BI INCL CAD: CPT

## 2022-11-29 NOTE — PROGRESS NOTES
Physical Therapy Treatment Note/Plan of Care    Room #:  8740/1891-01  Patient Name: James Garcias  YOB: 1928  MRN: [id number]    Date of Service: 5/23/2022     Tentative placement recommendation: Home Health Physical Therapy   Equipment recommendation: Patient has needed equipment       Evaluating Physical Therapist: Melvin Villalba #[id number]      Specific Provider Orders/Date/Referring Provider :  05/17/22 0845   PT eval and treat Start: 05/17/22 0845, End: 05/17/22 0845, ONE TIME, Standing Count: 1 Occurrences, R    Mi Ochoa MD      Admitting Diagnosis:   Cellulitis of lower extremity, unspecified laterality [O14.681]     non healing ulcers of left leg and it was healing and suddenly got worse. Her legs are swelling with fluid oozing fluid out. 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 Patient able to increase gait distance today with min A and wheeled walker, cues for step length and pacing. Patient motivated to work with therapy. Patient requires skilled physical therapy to address concerns listed above to increase safety and independence at discharge.          PHYSICAL THERAPY  PLAN OF CARE       Physical therapy plan of care is established based on physician order,  patient diagnosis and clinical Pt reports that Dr. [facility] can't see him for rectal bleeding (!) until February. Please call to ask if we can get him in sooner or if he needs to see a different group. assessment    Current Treatment Recommendations:    -Bed Mobility: Lower extremity exercises   -Sitting Balance: Incorporate reaching activities to activate trunk muscles   -Standing Balance: Perform strengthening exercises in standing to promote motor control with or without upper extremity support   -Transfers: Provide instruction on proper hand and foot position for adequate transfer of weight onto lower extremities and use of gait device if needed and Cues for hand placement, technique and safety. Provide stabilization to prevent fall   -Gait: Gait training, Standing activities to improve: base of support, weight shift, weight bearing  and Pregait training to emphasize: Increased step length , Upright and Safety   -Endurance: Utilize Supervised activities to increase level of endurance to allow for safe functional mobility including transfers and gait   -Stairs: Stair training with instruction on proper technique and hand placement on rail    PT long term treatment goals are located in below grid    Patient and or family understand(s) diagnosis, prognosis, and plan of care. Frequency of treatments: Patient will be seen  daily.          Prior Level of Function: Patient ambulated with wheeled walker    Rehab Potential: good    for baseline    Past medical history:   Past Medical History:   Diagnosis Date    [facility]     Cancer [facility]     melonoma - back; left breast CA    Chronic back pain     CKD (chronic kidney disease) stage 3, GFR 30-59 ml/min ([facility])     Hyperlipidemia     Hypertension     Macular degeneration     Osteoarthritis     Restless legs syndrome      Past Surgical History:   Procedure Laterality Date    BREAST LUMPECTOMY      BREAST SURGERY Left 6/3/15    lumpectomy    CATARACT REMOVAL WITH IMPLANT Right 3 8 16    COLONOSCOPY  5/27/15    EYE SURGERY Right 1/28/2022    PARS PLANA VITRECTOMY, ENDO LASER, GAS FLUID EXCHANGE, 25 G, MAC, RIGHT EYE performed by Gricelda Knutson MD at [address]      with uterus suspension.  MOHS SURGERY         SUBJECTIVE:    Precautions:  , falls ,    Social history: Patient lives alone in a ranch home  with 3 steps  to enter with [address] bathes     Equipment owned: Wheelchair, Cane and [address],       [address]   How much difficulty turning over in bed?: A Little  How much difficulty sitting down on / standing up from a chair with arms?: A Little  How much difficulty moving from lying on back to sitting on side of bed?: A Little  How much help from another person moving to and from a bed to a chair?: A Little  How much help from another person needed to walk in hospital room?: A Little  How much help from another person for climbing 3-5 steps with a railing?: A Lot  AM-PAC Inpatient Mobility Raw Score : 17  AM-PAC Inpatient T-Scale Score : 42.13  Mobility Inpatient CMS 0-100% Score: 50.57  Mobility Inpatient CMS G-Code Modifier : CK    Nursing cleared patient for PT treatment. .   OBJECTIVE;   Initial Evaluation  Date: 5/17/2022 Treatment Date:    5/23/2022   Short Term/ Long Term   Goals   Was pt agreeable to Eval/treatment? Yes Yes To be met in 3 days   Pain level   5/10  Bilateral ankles 3-4 /10  ankles    Bed Mobility    Rolling: Minimal assist of 1    Supine to sit: Minimal assist of 1    Sit to supine: Minimal assist of 1    Scooting: Minimal assist of 1   Rolling: Supervision    Supine to sit: Minimal assist of 1   Sit to supine: Minimal assist of 1   Scooting: Supervision     Rolling: Independent    Supine to sit:  Independent    Sit to supine: Independent    Scooting: Independent     Transfers Sit to stand: Minimal assist of 1 Cues for hand placement and safety  Sit to stand: Minimal assist of 1 cues for hand placement from side of bed    Sit to stand: Independent     Ambulation    1 x 15 feet 1 x 10 feet using  wheeled walker with Minimal assist of 1   for walker control and cues for upright posture, increased step length and safety 75 feet using  wheeled walker with Minimal assist of 1   cues for upright posture, safety, proper hand placement and pacing    100 feet using  wheeled walker with Modified Independent    Stair negotiation: ascended and descended   Not assessed  Not assessed      3 steps 1 rail with supervision    ROM Within functional limits    Increase range of motion 10% of affected joints    Strength BUE:  refer to OT eval  RLE:  3+/5  LLE:  3+/5  Increase strength in affected mm groups by 1/3 grade   Balance Sitting EOB:  good    Dynamic Standing:  fair + wheeled walker  Sitting EOB: good   Dynamic Standing: fair + wheeled walker     Sitting EOB:  good    Dynamic Standing: good wheeled walker      Patient is Alert & Oriented x person, place, time and situation and follows directions    Sensation:  Patient  reports numbness/tingling bilateral upper extremities     Edema:  yes bilateral lower extremities   Endurance: fair  +    Vitals: room air   Blood Pressure at rest  Blood Pressure during session    Heart Rate at rest   Heart Rate during session     SPO2 at rest 98%  SPO2 during session 95-98 %     Patient education  Patient educated on role of Physical Therapy, risks of immobility, safety and plan of care,  importance of mobility while in hospital  and ankle pumps, quad set and glut set for edema control, blood clot prevention     Patient response to education:   Pt verbalized understanding Pt demonstrated skill Pt requires further education in this area   Yes Partial Yes      Treatment:  Patient practiced and was instructed/facilitated in the following treatment: Patient assisted to EOB. Sat edge of bed 10 minutes with Supervision  to increase dynamic sitting balance and activity tolerance. performed exercises. Pt stood and ambulated in hallway and assisted up to chair.     Therapeutic Exercises:  ankle pumps, heel raises, glut sets, long arc quad and seated marching x 15 reps. At end of session, patient in chair  , nursing notified./ already aware . Patient would benefit from continued skilled Physical Therapy to improve functional independence and quality of life.          Patient's/ family goals   home    Time in  0838  Time out  0901    Total Treatment Time  23 minutes    CPT codes:  Therapeutic activities (70189)   15 minutes  1 unit(s)  Therapeutic exercises (05299)   8 minutes  1 unit(s)    Dorcas Adams P.LEEANNE [address]

## 2024-01-27 ENCOUNTER — APPOINTMENT (OUTPATIENT)
Dept: GENERAL RADIOLOGY | Age: 89
DRG: 605 | End: 2024-01-27
Payer: MEDICARE

## 2024-01-27 ENCOUNTER — APPOINTMENT (OUTPATIENT)
Dept: CT IMAGING | Age: 89
DRG: 605 | End: 2024-01-27
Payer: MEDICARE

## 2024-01-27 ENCOUNTER — HOSPITAL ENCOUNTER (INPATIENT)
Age: 89
LOS: 4 days | Discharge: NURSING, REHAB OR LONG-TERM CARE | DRG: 605 | End: 2024-01-31
Attending: EMERGENCY MEDICINE | Admitting: FAMILY MEDICINE
Payer: MEDICARE
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
## 2024-01-27 DIAGNOSIS — T14.8XXA HEMATOMA: Primary | ICD-10-CM

## 2024-01-27 DIAGNOSIS — S09.90XA INJURY OF HEAD, INITIAL ENCOUNTER: ICD-10-CM

## 2024-01-27 LAB
ABO + RH BLD: NORMAL
ANION GAP SERPL CALCULATED.3IONS-SCNC: 10 MMOL/L (ref 7–16)
ARM BAND NUMBER: NORMAL
BASOPHILS # BLD: 0.03 K/UL (ref 0–0.2)
BASOPHILS NFR BLD: 1 % (ref 0–2)
BLOOD BANK SAMPLE EXPIRATION: NORMAL
BLOOD GROUP ANTIBODIES SERPL: NEGATIVE
BUN SERPL-MCNC: 53 MG/DL (ref 6–23)
CALCIUM SERPL-MCNC: 8.8 MG/DL (ref 8.6–10.2)
CHLORIDE SERPL-SCNC: 101 MMOL/L (ref 98–107)
CK SERPL-CCNC: 66 U/L (ref 20–180)
CO2 SERPL-SCNC: 25 MMOL/L (ref 22–29)
CREAT SERPL-MCNC: 2.1 MG/DL (ref 0.5–1)
EOSINOPHIL # BLD: 0.04 K/UL (ref 0.05–0.5)
EOSINOPHILS RELATIVE PERCENT: 1 % (ref 0–6)
ERYTHROCYTE [DISTWIDTH] IN BLOOD BY AUTOMATED COUNT: 15.3 % (ref 11.5–15)
GFR SERPL CREATININE-BSD FRML MDRD: 21 ML/MIN/1.73M2
GLUCOSE SERPL-MCNC: 96 MG/DL (ref 74–99)
HCT VFR BLD AUTO: 29.4 % (ref 34–48)
HGB BLD-MCNC: 9.6 G/DL (ref 11.5–15.5)
IMM GRANULOCYTES # BLD AUTO: <0.03 K/UL (ref 0–0.58)
IMM GRANULOCYTES NFR BLD: 1 % (ref 0–5)
INR PPP: 1.6
LYMPHOCYTES NFR BLD: 0.72 K/UL (ref 1.5–4)
LYMPHOCYTES RELATIVE PERCENT: 17 % (ref 20–42)
MCH RBC QN AUTO: 32.1 PG (ref 26–35)
MCHC RBC AUTO-ENTMCNC: 32.7 G/DL (ref 32–34.5)
MCV RBC AUTO: 98.3 FL (ref 80–99.9)
MONOCYTES NFR BLD: 0.51 K/UL (ref 0.1–0.95)
MONOCYTES NFR BLD: 12 % (ref 2–12)
NEUTROPHILS NFR BLD: 69 % (ref 43–80)
NEUTS SEG NFR BLD: 2.98 K/UL (ref 1.8–7.3)
PARTIAL THROMBOPLASTIN TIME: 31.3 SEC (ref 24.5–35.1)
PLATELET # BLD AUTO: 150 K/UL (ref 130–450)
PMV BLD AUTO: 10.6 FL (ref 7–12)
POTASSIUM SERPL-SCNC: 4.6 MMOL/L (ref 3.5–5)
PROTHROMBIN TIME: 17.8 SEC (ref 9.3–12.4)
RBC # BLD AUTO: 2.99 M/UL (ref 3.5–5.5)
SODIUM SERPL-SCNC: 136 MMOL/L (ref 132–146)
WBC OTHER # BLD: 4.3 K/UL (ref 4.5–11.5)

## 2024-01-27 PROCEDURE — 85025 COMPLETE CBC W/AUTO DIFF WBC: CPT

## 2024-01-27 PROCEDURE — 6370000000 HC RX 637 (ALT 250 FOR IP): Performed by: FAMILY MEDICINE

## 2024-01-27 PROCEDURE — 70450 CT HEAD/BRAIN W/O DYE: CPT

## 2024-01-27 PROCEDURE — 99223 1ST HOSP IP/OBS HIGH 75: CPT | Performed by: FAMILY MEDICINE

## 2024-01-27 PROCEDURE — 86900 BLOOD TYPING SEROLOGIC ABO: CPT

## 2024-01-27 PROCEDURE — 73590 X-RAY EXAM OF LOWER LEG: CPT

## 2024-01-27 PROCEDURE — 86850 RBC ANTIBODY SCREEN: CPT

## 2024-01-27 PROCEDURE — 86901 BLOOD TYPING SEROLOGIC RH(D): CPT

## 2024-01-27 PROCEDURE — 93005 ELECTROCARDIOGRAM TRACING: CPT | Performed by: FAMILY MEDICINE

## 2024-01-27 PROCEDURE — 71045 X-RAY EXAM CHEST 1 VIEW: CPT

## 2024-01-27 PROCEDURE — 2580000003 HC RX 258: Performed by: FAMILY MEDICINE

## 2024-01-27 PROCEDURE — 80048 BASIC METABOLIC PNL TOTAL CA: CPT

## 2024-01-27 PROCEDURE — 85610 PROTHROMBIN TIME: CPT

## 2024-01-27 PROCEDURE — 85730 THROMBOPLASTIN TIME PARTIAL: CPT

## 2024-01-27 PROCEDURE — 82550 ASSAY OF CK (CPK): CPT

## 2024-01-27 PROCEDURE — 99285 EMERGENCY DEPT VISIT HI MDM: CPT

## 2024-01-27 PROCEDURE — 36415 COLL VENOUS BLD VENIPUNCTURE: CPT

## 2024-01-27 PROCEDURE — 1200000000 HC SEMI PRIVATE

## 2024-01-27 PROCEDURE — 72125 CT NECK SPINE W/O DYE: CPT

## 2024-01-27 RX ORDER — SODIUM CHLORIDE 9 MG/ML
INJECTION, SOLUTION INTRAVENOUS PRN
Status: DISCONTINUED | OUTPATIENT
Start: 2024-01-27 | End: 2024-01-31 | Stop reason: HOSPADM

## 2024-01-27 RX ORDER — AMIODARONE HYDROCHLORIDE 100 MG/1
100 TABLET ORAL DAILY
COMMUNITY
Start: 2023-03-28

## 2024-01-27 RX ORDER — METOPROLOL SUCCINATE 25 MG/1
25 TABLET, EXTENDED RELEASE ORAL DAILY
Status: DISCONTINUED | OUTPATIENT
Start: 2024-01-27 | End: 2024-01-31 | Stop reason: HOSPADM

## 2024-01-27 RX ORDER — EPINEPHRINE IN SOD CHLOR,ISO 1 MG/10 ML
1 SYRINGE (ML) INTRAVENOUS ONCE
Status: DISCONTINUED | OUTPATIENT
Start: 2024-01-27 | End: 2024-01-27

## 2024-01-27 RX ORDER — SODIUM CHLORIDE 9 MG/ML
INJECTION, SOLUTION INTRAVENOUS CONTINUOUS
Status: DISCONTINUED | OUTPATIENT
Start: 2024-01-27 | End: 2024-01-27

## 2024-01-27 RX ORDER — ACETAMINOPHEN 325 MG/1
650 TABLET ORAL EVERY 6 HOURS PRN
Status: DISCONTINUED | OUTPATIENT
Start: 2024-01-27 | End: 2024-01-31 | Stop reason: HOSPADM

## 2024-01-27 RX ORDER — ATORVASTATIN CALCIUM 20 MG/1
20 TABLET, FILM COATED ORAL DAILY
Status: DISCONTINUED | OUTPATIENT
Start: 2024-01-28 | End: 2024-01-31 | Stop reason: HOSPADM

## 2024-01-27 RX ORDER — VITAMIN B COMPLEX
2000 TABLET ORAL DAILY
Status: DISCONTINUED | OUTPATIENT
Start: 2024-01-28 | End: 2024-01-31 | Stop reason: HOSPADM

## 2024-01-27 RX ORDER — SODIUM CHLORIDE 0.9 % (FLUSH) 0.9 %
5-40 SYRINGE (ML) INJECTION EVERY 12 HOURS SCHEDULED
Status: DISCONTINUED | OUTPATIENT
Start: 2024-01-27 | End: 2024-01-31 | Stop reason: HOSPADM

## 2024-01-27 RX ORDER — HYDROCODONE BITARTRATE AND ACETAMINOPHEN 7.5; 325 MG/1; MG/1
1 TABLET ORAL EVERY 6 HOURS PRN
Status: DISCONTINUED | OUTPATIENT
Start: 2024-01-27 | End: 2024-01-31 | Stop reason: HOSPADM

## 2024-01-27 RX ORDER — BUMETANIDE 1 MG/1
1 TABLET ORAL DAILY
Status: DISCONTINUED | OUTPATIENT
Start: 2024-01-27 | End: 2024-01-31 | Stop reason: HOSPADM

## 2024-01-27 RX ORDER — SODIUM CHLORIDE 0.9 % (FLUSH) 0.9 %
5-40 SYRINGE (ML) INJECTION PRN
Status: DISCONTINUED | OUTPATIENT
Start: 2024-01-27 | End: 2024-01-31 | Stop reason: HOSPADM

## 2024-01-27 RX ORDER — SODIUM CHLORIDE 9 MG/ML
INJECTION, SOLUTION INTRAVENOUS CONTINUOUS
Status: ACTIVE | OUTPATIENT
Start: 2024-01-28 | End: 2024-01-28

## 2024-01-27 RX ORDER — DOXYCYCLINE HYCLATE 100 MG/1
100 CAPSULE ORAL EVERY 12 HOURS SCHEDULED
Status: DISCONTINUED | OUTPATIENT
Start: 2024-01-27 | End: 2024-01-31 | Stop reason: HOSPADM

## 2024-01-27 RX ORDER — SACUBITRIL AND VALSARTAN 24; 26 MG/1; MG/1
1 TABLET, FILM COATED ORAL 2 TIMES DAILY
COMMUNITY

## 2024-01-27 RX ORDER — METOPROLOL SUCCINATE 25 MG/1
25 TABLET, EXTENDED RELEASE ORAL DAILY
COMMUNITY
Start: 2023-03-28

## 2024-01-27 RX ORDER — HYDROCODONE BITARTRATE AND ACETAMINOPHEN 7.5; 325 MG/1; MG/1
1 TABLET ORAL EVERY 6 HOURS PRN
Status: ON HOLD | COMMUNITY
Start: 2024-01-26 | End: 2024-01-28 | Stop reason: HOSPADM

## 2024-01-27 RX ORDER — AMIODARONE HYDROCHLORIDE 200 MG/1
100 TABLET ORAL DAILY
Status: DISCONTINUED | OUTPATIENT
Start: 2024-01-28 | End: 2024-01-31 | Stop reason: HOSPADM

## 2024-01-27 RX ORDER — ACETAMINOPHEN 650 MG/1
650 SUPPOSITORY RECTAL EVERY 6 HOURS PRN
Status: DISCONTINUED | OUTPATIENT
Start: 2024-01-27 | End: 2024-01-31 | Stop reason: HOSPADM

## 2024-01-27 RX ADMIN — SACUBITRIL AND VALSARTAN 1 TABLET: 24; 26 TABLET, FILM COATED ORAL at 23:33

## 2024-01-27 RX ADMIN — DOXYCYCLINE HYCLATE 100 MG: 100 CAPSULE ORAL at 23:33

## 2024-01-27 RX ADMIN — SODIUM CHLORIDE: 9 INJECTION, SOLUTION INTRAVENOUS at 23:59

## 2024-01-27 RX ADMIN — METOPROLOL SUCCINATE 25 MG: 25 TABLET, EXTENDED RELEASE ORAL at 23:33

## 2024-01-27 ASSESSMENT — PAIN SCALES - GENERAL: PAINLEVEL_OUTOF10: 0

## 2024-01-27 ASSESSMENT — LIFESTYLE VARIABLES
HOW MANY STANDARD DRINKS CONTAINING ALCOHOL DO YOU HAVE ON A TYPICAL DAY: PATIENT DOES NOT DRINK
HOW OFTEN DO YOU HAVE A DRINK CONTAINING ALCOHOL: NEVER

## 2024-01-27 NOTE — CONSULTS
Department of Orthopedic Trauma Surgery  Resident consult note      CHIEF COMPLAINT:   Chief Complaint   Patient presents with    Fall     Mechanical fall this AM, +head injury, on eliquis. Large hematoma to right lower leg       HISTORY OF PRESENT ILLNESS:                Patient is a 89 y.o. female who presents with right lower leg pain and hematoma. She was ambulating at home with a walker this morning when she had a mechanical fall and hit her right leg. She was able to ambulate after the fall however noticed swelling and hematoma continued to expand. They were directed to wrap with compressive dressing. Despite this, it continued to expand and her pain continued, leading her to the ED. She does take eliquis at baseline but she does not know what for.  Denies numbness/tingling/paresthesias.  Denies any other orthopedic complaints at this time.         Past Medical History:        Diagnosis Date    Breast cancer (HCC)     Cancer (HCC)     melonoma - back; left breast CA    Chronic back pain     CKD (chronic kidney disease) stage 3, GFR 30-59 ml/min (HCC)     Hyperlipidemia     Hypertension     Macular degeneration     Osteoarthritis     Restless legs syndrome      Past Surgical History:        Procedure Laterality Date    BREAST LUMPECTOMY      BREAST SURGERY Left 6/3/15    lumpectomy    CATARACT EXTRACTION W/  INTRAOCULAR LENS IMPLANT Right 3 8 16    COLONOSCOPY  5/27/15    EYE SURGERY Right 1/28/2022    PARS PLANA VITRECTOMY, ENDO LASER, GAS FLUID EXCHANGE, 25 G, MAC, RIGHT EYE performed by Adria Ibrahim MD at [facility] OR    HYSTERECTOMY      with uterus suspension.    MOHS SURGERY       Current Medications:   No current facility-administered medications for this encounter.  Allergies:  Flagyl [metronidazole], Adhesive tape, and Penicillin g    Social History:   TOBACCO:   reports that she has never smoked. She has never used smokeless tobacco.  ETOH:   reports no history of alcohol use.  DRUGS:    toes, foot warm and perfused  Sensation intact to light touch in sural/deep peroneal/superficial peroneal/saphenous/posterior tibial nerve distributions to foot/ankle  Demonstrates active ankle plantar/dorsiflexion/great toe extension      Secondary Exam:   bilateralUE: No obvious signs of trauma.  -TTP to fingers, hand, wrist, forearm, elbow, humerus, shoulder or clavicle.-- Patient able to flex/extend fingers, wrist, elbow and shoulder with active and passive ROM without pain, +2/4 Radial pulse, compartments soft and compressible.    Pelvis: -TTP, -Log roll, -Heel strike         DATA:    CBC:   Lab Results   Component Value Date/Time    WBC 4.3 01/27/2024 06:01 PM    RBC 2.99 01/27/2024 06:01 PM    HGB 9.6 01/27/2024 06:01 PM    HCT 29.4 01/27/2024 06:01 PM    MCV 98.3 01/27/2024 06:01 PM    MCH 32.1 01/27/2024 06:01 PM    MCHC 32.7 01/27/2024 06:01 PM    RDW 15.3 01/27/2024 06:01 PM     01/27/2024 06:01 PM    MPV 10.6 01/27/2024 06:01 PM     PT/INR:    Lab Results   Component Value Date/Time    PROTIME 17.8 01/27/2024 06:01 PM    INR 1.6 01/27/2024 06:01 PM       Radiology Review:  Xray right tib/fib demonstrates no  acute fractures or dislocations. There is a large overlying area of edema and end stage tricompartmental arthritis noted. Poor bone mineral density throughout.    IMPRESSION:  Right anterolateral tibia hematoma    PLAN:  Discussed radiographic and physical exam findings with patient and family today. We did discuss recommendation for operative I&D as well as risks and benefits including infection, wound problems, blood loss and problems with anesthesia. They are understanding and agreeable to proceed. Any and all questions were answered today.  NWB RLE  Compressive Ace wrap to RLE, elevate to reduce swelling  Appreciate timely medical optimization  Plan for I&D tomorrow pending medical clearance  NPO midnight, treatment consent, Ancef to OR, hold anticoagulation  D/W

## 2024-01-28 ENCOUNTER — ANESTHESIA (OUTPATIENT)
Dept: OPERATING ROOM | Age: 89
End: 2024-01-28
Payer: MEDICARE

## 2024-01-28 ENCOUNTER — ANESTHESIA EVENT (OUTPATIENT)
Dept: OPERATING ROOM | Age: 89
End: 2024-01-28
Payer: MEDICARE
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
## 2024-01-28 PROBLEM — S80.11XA HEMATOMA OF RIGHT LOWER EXTREMITY: Status: ACTIVE | Noted: 2024-01-27

## 2024-01-28 LAB
ANION GAP SERPL CALCULATED.3IONS-SCNC: 9 MMOL/L (ref 7–16)
BASOPHILS # BLD: 0.09 K/UL (ref 0–0.2)
BASOPHILS NFR BLD: 3 % (ref 0–2)
BUN SERPL-MCNC: 51 MG/DL (ref 6–23)
CALCIUM SERPL-MCNC: 8.6 MG/DL (ref 8.6–10.2)
CHLORIDE SERPL-SCNC: 105 MMOL/L (ref 98–107)
CO2 SERPL-SCNC: 25 MMOL/L (ref 22–29)
CREAT SERPL-MCNC: 2.1 MG/DL (ref 0.5–1)
EOSINOPHIL # BLD: 0.03 K/UL (ref 0.05–0.5)
EOSINOPHILS RELATIVE PERCENT: 1 % (ref 0–6)
ERYTHROCYTE [DISTWIDTH] IN BLOOD BY AUTOMATED COUNT: 15.6 % (ref 11.5–15)
GFR SERPL CREATININE-BSD FRML MDRD: 21 ML/MIN/1.73M2
GLUCOSE SERPL-MCNC: 94 MG/DL (ref 74–99)
HCT VFR BLD AUTO: 26 % (ref 34–48)
HGB BLD-MCNC: 8.2 G/DL (ref 11.5–15.5)
LYMPHOCYTES NFR BLD: 0.53 K/UL (ref 1.5–4)
LYMPHOCYTES RELATIVE PERCENT: 16 % (ref 20–42)
MCH RBC QN AUTO: 31.2 PG (ref 26–35)
MCHC RBC AUTO-ENTMCNC: 31.5 G/DL (ref 32–34.5)
MCV RBC AUTO: 98.9 FL (ref 80–99.9)
MONOCYTES NFR BLD: 0.12 K/UL (ref 0.1–0.95)
MONOCYTES NFR BLD: 4 % (ref 2–12)
NEUTROPHILS NFR BLD: 77 % (ref 43–80)
NEUTS SEG NFR BLD: 2.63 K/UL (ref 1.8–7.3)
PLATELET # BLD AUTO: 139 K/UL (ref 130–450)
PMV BLD AUTO: 11.1 FL (ref 7–12)
POTASSIUM SERPL-SCNC: 4.5 MMOL/L (ref 3.5–5)
RBC # BLD AUTO: 2.63 M/UL (ref 3.5–5.5)
RBC # BLD: ABNORMAL 10*6/UL
SODIUM SERPL-SCNC: 139 MMOL/L (ref 132–146)
WBC OTHER # BLD: 3.4 K/UL (ref 4.5–11.5)

## 2024-01-28 PROCEDURE — 0JCN0ZZ EXTIRPATION OF MATTER FROM RIGHT LOWER LEG SUBCUTANEOUS TISSUE AND FASCIA, OPEN APPROACH: ICD-10-PCS | Performed by: ORTHOPAEDIC SURGERY

## 2024-01-28 PROCEDURE — 3700000001 HC ADD 15 MINUTES (ANESTHESIA): Performed by: ORTHOPAEDIC SURGERY

## 2024-01-28 PROCEDURE — 6360000002 HC RX W HCPCS: Performed by: NURSE ANESTHETIST, CERTIFIED REGISTERED

## 2024-01-28 PROCEDURE — 1200000000 HC SEMI PRIVATE

## 2024-01-28 PROCEDURE — 2580000003 HC RX 258

## 2024-01-28 PROCEDURE — 80048 BASIC METABOLIC PNL TOTAL CA: CPT

## 2024-01-28 PROCEDURE — 2580000003 HC RX 258: Performed by: ORTHOPAEDIC SURGERY

## 2024-01-28 PROCEDURE — 3600000012 HC SURGERY LEVEL 2 ADDTL 15MIN: Performed by: ORTHOPAEDIC SURGERY

## 2024-01-28 PROCEDURE — 6370000000 HC RX 637 (ALT 250 FOR IP): Performed by: FAMILY MEDICINE

## 2024-01-28 PROCEDURE — 2580000003 HC RX 258: Performed by: NURSE ANESTHETIST, CERTIFIED REGISTERED

## 2024-01-28 PROCEDURE — 7100000000 HC PACU RECOVERY - FIRST 15 MIN: Performed by: ORTHOPAEDIC SURGERY

## 2024-01-28 PROCEDURE — 97530 THERAPEUTIC ACTIVITIES: CPT | Performed by: PHYSICAL THERAPIST

## 2024-01-28 PROCEDURE — 11043 DBRDMT MUSC&/FSCA 1ST 20/<: CPT | Performed by: ORTHOPAEDIC SURGERY

## 2024-01-28 PROCEDURE — 97161 PT EVAL LOW COMPLEX 20 MIN: CPT | Performed by: PHYSICAL THERAPIST

## 2024-01-28 PROCEDURE — 36415 COLL VENOUS BLD VENIPUNCTURE: CPT

## 2024-01-28 PROCEDURE — 99232 SBSQ HOSP IP/OBS MODERATE 35: CPT | Performed by: INTERNAL MEDICINE

## 2024-01-28 PROCEDURE — 2580000003 HC RX 258: Performed by: FAMILY MEDICINE

## 2024-01-28 PROCEDURE — 6360000002 HC RX W HCPCS

## 2024-01-28 PROCEDURE — 6360000002 HC RX W HCPCS: Performed by: INTERNAL MEDICINE

## 2024-01-28 PROCEDURE — 3700000000 HC ANESTHESIA ATTENDED CARE: Performed by: ORTHOPAEDIC SURGERY

## 2024-01-28 PROCEDURE — 11046 DBRDMT MUSC&/FSCA EA ADDL: CPT | Performed by: ORTHOPAEDIC SURGERY

## 2024-01-28 PROCEDURE — 85025 COMPLETE CBC W/AUTO DIFF WBC: CPT

## 2024-01-28 PROCEDURE — 2500000003 HC RX 250 WO HCPCS: Performed by: ORTHOPAEDIC SURGERY

## 2024-01-28 PROCEDURE — 3600000002 HC SURGERY LEVEL 2 BASE: Performed by: ORTHOPAEDIC SURGERY

## 2024-01-28 PROCEDURE — 7100000001 HC PACU RECOVERY - ADDTL 15 MIN: Performed by: ORTHOPAEDIC SURGERY

## 2024-01-28 PROCEDURE — 2709999900 HC NON-CHARGEABLE SUPPLY: Performed by: ORTHOPAEDIC SURGERY

## 2024-01-28 RX ORDER — SODIUM CHLORIDE 9 MG/ML
INJECTION, SOLUTION INTRAVENOUS PRN
Status: DISCONTINUED | OUTPATIENT
Start: 2024-01-28 | End: 2024-01-31 | Stop reason: HOSPADM

## 2024-01-28 RX ORDER — ENOXAPARIN SODIUM 100 MG/ML
30 INJECTION SUBCUTANEOUS DAILY
Status: DISCONTINUED | OUTPATIENT
Start: 2024-01-29 | End: 2024-01-28

## 2024-01-28 RX ORDER — ONDANSETRON 2 MG/ML
4 INJECTION INTRAMUSCULAR; INTRAVENOUS EVERY 6 HOURS PRN
Status: DISCONTINUED | OUTPATIENT
Start: 2024-01-28 | End: 2024-01-31 | Stop reason: HOSPADM

## 2024-01-28 RX ORDER — PROCHLORPERAZINE EDISYLATE 5 MG/ML
5 INJECTION INTRAMUSCULAR; INTRAVENOUS
Status: DISCONTINUED | OUTPATIENT
Start: 2024-01-28 | End: 2024-01-28 | Stop reason: HOSPADM

## 2024-01-28 RX ORDER — PROPOFOL 10 MG/ML
INJECTION, EMULSION INTRAVENOUS PRN
Status: DISCONTINUED | OUTPATIENT
Start: 2024-01-28 | End: 2024-01-28 | Stop reason: SDUPTHER

## 2024-01-28 RX ORDER — SODIUM CHLORIDE 0.9 % (FLUSH) 0.9 %
5-40 SYRINGE (ML) INJECTION PRN
Status: DISCONTINUED | OUTPATIENT
Start: 2024-01-28 | End: 2024-01-31 | Stop reason: HOSPADM

## 2024-01-28 RX ORDER — ONDANSETRON 4 MG/1
4 TABLET, ORALLY DISINTEGRATING ORAL EVERY 8 HOURS PRN
Status: DISCONTINUED | OUTPATIENT
Start: 2024-01-28 | End: 2024-01-31 | Stop reason: HOSPADM

## 2024-01-28 RX ORDER — LABETALOL HYDROCHLORIDE 5 MG/ML
10 INJECTION, SOLUTION INTRAVENOUS
Status: DISCONTINUED | OUTPATIENT
Start: 2024-01-28 | End: 2024-01-28 | Stop reason: HOSPADM

## 2024-01-28 RX ORDER — ONDANSETRON 2 MG/ML
INJECTION INTRAMUSCULAR; INTRAVENOUS PRN
Status: DISCONTINUED | OUTPATIENT
Start: 2024-01-28 | End: 2024-01-28 | Stop reason: SDUPTHER

## 2024-01-28 RX ORDER — FENTANYL CITRATE 0.05 MG/ML
25 INJECTION, SOLUTION INTRAMUSCULAR; INTRAVENOUS EVERY 5 MIN PRN
Status: DISCONTINUED | OUTPATIENT
Start: 2024-01-28 | End: 2024-01-28 | Stop reason: HOSPADM

## 2024-01-28 RX ORDER — IPRATROPIUM BROMIDE AND ALBUTEROL SULFATE 2.5; .5 MG/3ML; MG/3ML
1 SOLUTION RESPIRATORY (INHALATION)
Status: DISCONTINUED | OUTPATIENT
Start: 2024-01-28 | End: 2024-01-28 | Stop reason: HOSPADM

## 2024-01-28 RX ORDER — SODIUM CHLORIDE 0.9 % (FLUSH) 0.9 %
5-40 SYRINGE (ML) INJECTION EVERY 12 HOURS SCHEDULED
Status: DISCONTINUED | OUTPATIENT
Start: 2024-01-28 | End: 2024-01-31 | Stop reason: HOSPADM

## 2024-01-28 RX ORDER — HYDROMORPHONE HYDROCHLORIDE 1 MG/ML
0.5 INJECTION, SOLUTION INTRAMUSCULAR; INTRAVENOUS; SUBCUTANEOUS EVERY 5 MIN PRN
Status: DISCONTINUED | OUTPATIENT
Start: 2024-01-28 | End: 2024-01-28 | Stop reason: HOSPADM

## 2024-01-28 RX ORDER — SODIUM CHLORIDE, SODIUM LACTATE, POTASSIUM CHLORIDE, CALCIUM CHLORIDE 600; 310; 30; 20 MG/100ML; MG/100ML; MG/100ML; MG/100ML
INJECTION, SOLUTION INTRAVENOUS CONTINUOUS PRN
Status: DISCONTINUED | OUTPATIENT
Start: 2024-01-28 | End: 2024-01-28 | Stop reason: SDUPTHER

## 2024-01-28 RX ORDER — ONDANSETRON 2 MG/ML
4 INJECTION INTRAMUSCULAR; INTRAVENOUS
Status: DISCONTINUED | OUTPATIENT
Start: 2024-01-28 | End: 2024-01-28 | Stop reason: HOSPADM

## 2024-01-28 RX ORDER — HYDRALAZINE HYDROCHLORIDE 20 MG/ML
10 INJECTION INTRAMUSCULAR; INTRAVENOUS
Status: DISCONTINUED | OUTPATIENT
Start: 2024-01-28 | End: 2024-01-28 | Stop reason: HOSPADM

## 2024-01-28 RX ORDER — DIPHENHYDRAMINE HYDROCHLORIDE 50 MG/ML
12.5 INJECTION INTRAMUSCULAR; INTRAVENOUS
Status: DISCONTINUED | OUTPATIENT
Start: 2024-01-28 | End: 2024-01-28 | Stop reason: HOSPADM

## 2024-01-28 RX ORDER — MIDAZOLAM HYDROCHLORIDE 1 MG/ML
2 INJECTION INTRAMUSCULAR; INTRAVENOUS
Status: DISCONTINUED | OUTPATIENT
Start: 2024-01-28 | End: 2024-01-28 | Stop reason: HOSPADM

## 2024-01-28 RX ORDER — DEXAMETHASONE SODIUM PHOSPHATE 10 MG/ML
INJECTION, SOLUTION INTRAMUSCULAR; INTRAVENOUS PRN
Status: DISCONTINUED | OUTPATIENT
Start: 2024-01-28 | End: 2024-01-28 | Stop reason: SDUPTHER

## 2024-01-28 RX ORDER — FENTANYL CITRATE 50 UG/ML
INJECTION, SOLUTION INTRAMUSCULAR; INTRAVENOUS PRN
Status: DISCONTINUED | OUTPATIENT
Start: 2024-01-28 | End: 2024-01-28 | Stop reason: SDUPTHER

## 2024-01-28 RX ORDER — LIDOCAINE HYDROCHLORIDE 20 MG/ML
INJECTION, SOLUTION INTRAVENOUS PRN
Status: DISCONTINUED | OUTPATIENT
Start: 2024-01-28 | End: 2024-01-28 | Stop reason: SDUPTHER

## 2024-01-28 RX ORDER — HEPARIN SODIUM 5000 [USP'U]/ML
5000 INJECTION, SOLUTION INTRAVENOUS; SUBCUTANEOUS EVERY 8 HOURS SCHEDULED
Status: DISCONTINUED | OUTPATIENT
Start: 2024-01-28 | End: 2024-01-31 | Stop reason: HOSPADM

## 2024-01-28 RX ORDER — OXYCODONE HYDROCHLORIDE AND ACETAMINOPHEN 5; 325 MG/1; MG/1
1 TABLET ORAL EVERY 6 HOURS PRN
Qty: 28 TABLET | Refills: 0 | Status: SHIPPED | OUTPATIENT
Start: 2024-01-28 | End: 2024-02-04

## 2024-01-28 RX ADMIN — HEPARIN SODIUM 5000 UNITS: 5000 INJECTION INTRAVENOUS; SUBCUTANEOUS at 22:01

## 2024-01-28 RX ADMIN — ONDANSETRON 4 MG: 2 INJECTION INTRAMUSCULAR; INTRAVENOUS at 10:20

## 2024-01-28 RX ADMIN — Medication 10 ML: at 21:00

## 2024-01-28 RX ADMIN — FENTANYL CITRATE 50 MCG: 50 INJECTION, SOLUTION INTRAMUSCULAR; INTRAVENOUS at 09:56

## 2024-01-28 RX ADMIN — FENTANYL CITRATE 50 MCG: 50 INJECTION, SOLUTION INTRAMUSCULAR; INTRAVENOUS at 10:20

## 2024-01-28 RX ADMIN — PROPOFOL 100 MG: 10 INJECTION, EMULSION INTRAVENOUS at 09:56

## 2024-01-28 RX ADMIN — SACUBITRIL AND VALSARTAN 1 TABLET: 24; 26 TABLET, FILM COATED ORAL at 22:01

## 2024-01-28 RX ADMIN — DEXAMETHASONE SODIUM PHOSPHATE 10 MG: 10 INJECTION INTRAMUSCULAR; INTRAVENOUS at 09:56

## 2024-01-28 RX ADMIN — SODIUM CHLORIDE, PRESERVATIVE FREE 10 ML: 5 INJECTION INTRAVENOUS at 22:05

## 2024-01-28 RX ADMIN — SODIUM CHLORIDE, POTASSIUM CHLORIDE, SODIUM LACTATE AND CALCIUM CHLORIDE: 600; 310; 30; 20 INJECTION, SOLUTION INTRAVENOUS at 09:47

## 2024-01-28 RX ADMIN — ACETAMINOPHEN 650 MG: 325 TABLET ORAL at 00:10

## 2024-01-28 RX ADMIN — CEFAZOLIN 2000 MG: 2 INJECTION, POWDER, FOR SOLUTION INTRAMUSCULAR; INTRAVENOUS at 10:02

## 2024-01-28 RX ADMIN — LIDOCAINE HYDROCHLORIDE 50 MG: 20 INJECTION, SOLUTION INTRAVENOUS at 09:56

## 2024-01-28 RX ADMIN — MUPIROCIN: 20 OINTMENT TOPICAL at 14:17

## 2024-01-28 RX ADMIN — MUPIROCIN: 20 OINTMENT TOPICAL at 22:04

## 2024-01-28 RX ADMIN — HYDROCODONE BITARTRATE AND ACETAMINOPHEN 1 TABLET: 7.5; 325 TABLET ORAL at 22:01

## 2024-01-28 RX ADMIN — DOXYCYCLINE HYCLATE 100 MG: 100 CAPSULE ORAL at 22:01

## 2024-01-28 RX ADMIN — CEFAZOLIN 2000 MG: 2 INJECTION, POWDER, FOR SOLUTION INTRAMUSCULAR; INTRAVENOUS at 18:05

## 2024-01-28 ASSESSMENT — PAIN SCALES - GENERAL
PAINLEVEL_OUTOF10: 6
PAINLEVEL_OUTOF10: 7
PAINLEVEL_OUTOF10: 8
PAINLEVEL_OUTOF10: 0

## 2024-01-28 ASSESSMENT — PAIN DESCRIPTION - DESCRIPTORS
DESCRIPTORS: ACHING
DESCRIPTORS: ACHING

## 2024-01-28 ASSESSMENT — PAIN DESCRIPTION - ORIENTATION: ORIENTATION: RIGHT

## 2024-01-28 ASSESSMENT — PAIN DESCRIPTION - LOCATION
LOCATION: BACK
LOCATION: LEG

## 2024-01-28 NOTE — PROGRESS NOTES
Physical Therapy    Physical Therapy Initial Evaluation/Plan of Care    Room #:  0318/0318-01  Patient Name: Mary Khan  YOB: 1928  MRN: [id number]    Date of Service: 1/28/2024     Tentative placement recommendation: Subacute Rehab  Equipment recommendation: Patient has needed equipment       Evaluating Physical Therapist: Franco Dickey, PT  #[id number]      Specific Provider Orders/Date/Referring Provider :     PT evaluation and treat  Start:  01/27/24 2000,   End:  01/27/24 2000,   ONE TIME,   Standing Count:  1 Occurrences,   R       Angel, Maria Eugenia COHEN DO    Admitting Diagnosis:   Hematoma [T14.8XXA]  Injury of head, initial encounter [S09.90XA]  Fall, initial encounter [W19.XXXA]    Admitted with    fall and hematoma  Surgery: Date of Procedure: 1/28/2024   Pre operative diagnosis: Right leg hematoma with skin necrosis   Procedure:  Right leg hematoma decompression  Irrigation and debridement of 15cm by 30cm wound down to fascia     Surgeon(s):  Silverio Donohue MD  Visit Diagnoses         Codes    Injury of head, initial encounter     S09.90XA            Patient Active Problem List   Diagnosis    Breast cancer (HCC)    Hypertension    Malignant neoplasm of lower-outer quadrant of left female breast (HCC)    Cellulitis of lower extremity    Hyperlipidemia    Chronic back pain    Non-healing ulcer (HCC)    CKD (chronic kidney disease) stage 4, GFR 15-29 ml/min (HCC)    Chronic diastolic heart failure (HCC)    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   Decreased strength, balance and endurance

## 2024-01-28 NOTE — CARE COORDINATION
CM note: Consult noted for post op discharge planning. Pt is currently in OR.  CM will follow up with patient tomorrow for full assessment.  Pt would benefit from post op PT/OT evals to assist with discharge plan.  If patient needs SNF at discharge NO PRECERT is required but patient would NEED a 3 day IP qualifying hospital stay under Medicare guidelines.

## 2024-01-28 NOTE — PROGRESS NOTES
Progress Note  Date:2024       Room:OR POOL/NONE  Patient Name:Mary Khan     YOB: 1928     Age:89 y.o.        Subjective    Subjective:  Symptoms:  Stable.  No shortness of breath, cough, chest pain, weakness or diarrhea.  (Pt was seen in recovery after drainage of her hematoma , ).    Diet:  NPO.  No nausea.       Review of Systems   Constitutional:  Negative for appetite change, chills, fatigue, fever and unexpected weight change.   HENT:  Negative for congestion, facial swelling, mouth sores, rhinorrhea and sinus pain.    Eyes:  Negative for visual disturbance.   Respiratory:  Negative for cough, chest tightness, shortness of breath and wheezing.    Cardiovascular:  Negative for chest pain and leg swelling.   Gastrointestinal:  Negative for abdominal distention, abdominal pain, blood in stool, constipation, diarrhea and nausea.   Genitourinary:  Negative for difficulty urinating, dysuria, frequency and urgency.   Musculoskeletal:  Negative for joint swelling.   Skin:  Negative for rash.   Neurological:  Negative for dizziness, syncope, weakness, light-headedness and headaches.   Psychiatric/Behavioral:  Negative for behavioral problems, confusion and hallucinations.      Objective         Vitals Last 24 Hours:  TEMPERATURE:  Temp  Av.9 °F (36.6 °C)  Min: 97.5 °F (36.4 °C)  Max: 98.5 °F (36.9 °C)  RESPIRATIONS RANGE: Resp  Av.9  Min: 14  Max: 20  PULSE OXIMETRY RANGE: SpO2  Av.3 %  Min: 94 %  Max: 100 %  PULSE RANGE: Pulse  Av.3  Min: 52  Max: 58  BLOOD PRESSURE RANGE: Systolic (24hrs), Av , Min:116 , Max:136   ; Diastolic (24hrs), Av, Min:47, Max:75    I/O (24Hr):    Intake/Output Summary (Last 24 hours) at 2024 1108  Last data filed at 2024 1033  Gross per 24 hour   Intake 300 ml   Output 240 ml   Net 60 ml     Objective:  General Appearance:  Comfortable and in no acute distress.    Vital signs: (most recent): Blood pressure (!) 131/52, pulse

## 2024-01-28 NOTE — ED PROVIDER NOTES
Department of Emergency Medicine     Written by: Raymundo Veliz MD  Patient Name: Mary Khan  Admit Date: 2024  5:10 PM  MRN: [id number]                   : 9/3/1928    HPI  Chief Complaint   Patient presents with    Fall     Mechanical fall this AM, +head injury, on eliquis. Large hematoma to right lower leg       Mary Khan is a 89 y.o. female that presents to the ED with right lower extremity hematoma after mechanical fall this morning.  Patient is on Eliquis.  She fell on her face and hit her head.  Did not lose conscious.  No headache.  Patient says that initially her right lower extremity was erythematous, however progressively hematoma improved.  She denies any symptoms other than pain to the right lower extremity.    Pulses intact, able to move extremity appropriately, only limited to pain.  Able to wiggle toes, DP PT pulses present.  Normal sensation.  Compartment soft compressible, no concern for compartment syndrome.  No pain out of proportion.    Review of systems:  Pertinent positives and negatives mentioned in the HPI/MDM.    Physical Exam  Constitutional:       General: She is not in acute distress.     Appearance: Normal appearance.   Eyes:      Extraocular Movements: Extraocular movements intact.      Pupils: Pupils are equal, round, and reactive to light.   Cardiovascular:      Rate and Rhythm: Normal rate and regular rhythm.   Pulmonary:      Effort: Pulmonary effort is normal. No respiratory distress.   Abdominal:      Palpations: Abdomen is soft.      Tenderness: There is no abdominal tenderness.   Musculoskeletal:         General: Swelling, tenderness and signs of injury present.   Skin:     Findings: Bruising present.      Comments: Hematoma as an image below.   Neurological:      Mental Status: She is alert and oriented to person, place, and time. Mental status is at baseline.            Chart review: Patient was admitted on 2022 for lower extremity  132 - 146 mmol/L    Potassium 4.6 3.5 - 5.0 mmol/L    Chloride 101 98 - 107 mmol/L    CO2 25 22 - 29 mmol/L    Anion Gap 10 7 - 16 mmol/L    Glucose 96 74 - 99 mg/dL    BUN 53 (H) 6 - 23 mg/dL    Creatinine 2.1 (H) 0.50 - 1.00 mg/dL    Est, Glom Filt Rate 21 (L) >60 mL/min/1.73m2    Calcium 8.8 8.6 - 10.2 mg/dL   Protime-INR   Result Value Ref Range    Protime 17.8 (H) 9.3 - 12.4 sec    INR 1.6    APTT   Result Value Ref Range    PTT 31.3 24.5 - 35.1 sec   CK   Result Value Ref Range    Total CK 66 20 - 180 U/L       Radiology reviewed and interpreted by me:  CT Head W/O Contrast   Final Result   1.  No acute intracranial abnormality.      2.  Signs of deep white matter small vessel disease.         CT CSpine W/O Contrast   Final Result   No acute fracture or traumatic malalignment of the cervical spine.         XR TIBIA FIBULA RIGHT (2 VIEWS)   Final Result   1. No sign of fracture or dislocation of the tibia or fibula.   2. Large hematoma along the lateral aspect of the mid calf.   3. Severe primary osteoarthritis of the medial and lateral joint compartments   of the knee with moderate primary osteoarthritis of the patellofemoral   articulation.             MDM and ED course  History is obtained from patient, EMS, and family for patient's acute onset of severe hematoma to right lower extremity    Differential diagnoses: Tibia-fibula fracture, hematoma, compartment syndrome     Patient presented after mechanical fall with right lower extremity hematoma gradually worsening.  On Eliquis.  Patient hit her head.  She is neurovascularly intact.  Please see image above for hematoma  Pulses intact without Doppler, compartments soft and compressible, motor and sensory intact, motor only limited secondary to pain  CT head and cervical spine negative  Discussed with orthopedics, plan for I&D tomorrow  Discussed with admitting team for admission    Clinical Impression  1. Hematoma    2. Fall, initial encounter    3. Injury of

## 2024-01-28 NOTE — PROGRESS NOTES
4 Eyes Skin Assessment     NAME:  Mary Khan  YOB: 1928  MEDICAL RECORD NUMBER:  [id number]    The patient is being assessed for  Admission    I agree that at least one RN has performed a thorough Head to Toe Skin Assessment on the patient. ALL assessment sites listed below have been assessed.      Areas assessed by both nurses:    Head, Face, Ears, Shoulders, Back, Chest, Arms, Elbows, Hands, Sacrum. Buttock, Coccyx, Ischium, and Legs. Feet and Heels        Does the Patient have a Wound? Yes wound(s) were present on assessment. LDA wound assessment was Initiated and completed by RN       Lior Prevention initiated by RN: No  Wound Care Orders initiated by RN: No    Pressure Injury (Stage 3,4, Unstageable, DTI, NWPT, and Complex wounds) if present, place Wound referral order by RN under : No    New Ostomies, if present place, Ostomy referral order under : No     Nurse 1 eSignature: Electronically signed by Estefani Mart RN on 1/28/24 at 6:25 AM EST    **SHARE this note so that the co-signing nurse can place an eSignature**    Nurse 2 eSignature: {Esignature:[id number]}

## 2024-01-28 NOTE — OP NOTE
Operative Note      Patient: Mary Khan  YOB: 1928  MRN: [id number]    Date of Procedure: 1/28/2024    Pre operative diagnosis: Right leg hematoma with skin necrosis    Post-Op Diagnosis: Same       Procedure:  Right leg hematoma decompression  Irrigation and debridement of 15cm by 30cm wound down to fascia    Surgeon(s):  Silverio Donohue MD    Assistant:   Resident: Marlon Miles DO; Kelby Bower DO    Anesthesia: General    Estimated Blood Loss (mL): Minimal    Complications: None    Specimens:   * No specimens in log *    Implants:  * No implants in log *      Drains: * No LDAs found *    Findings: see below        Detailed Description of Procedure:   The patient is a pleasant 89-year-old female who bumped her leg while walking with her walker and sustained a hematoma over the right lower extremity.  She tried compressive wraps but continued experience eventually she came to the ED for evaluation.  She was admitted to the floor and was orthopedic team recommendation that she undergo decompression irrigation debridement of her hematoma to the size as well as a pressure on the skin.  She was seen in the preoperative holding area the operative side and site were agreed upon by patient and surgeon.  We discussed risk and benefits of surgery versus nonoperative treatment with the patient at this point she would like to proceed with surgery.    She was brought to the operative suite transferred the operative table by multiple individuals in same manner and underwent sedation by the care of the anesthesia team.  All of her bony prominences were well-padded and a tourniquet was placed high on the right thigh.    The patient was prepped in normal sterile fashion.  A preoperative timeout was performed identifying the correct patient, operative side and site, that preoperative antibiotics had been given.  This was agreed upon by all present.  At this point a incision was made on the inferior

## 2024-01-28 NOTE — H&P
[facility] Hospitalist Group   History and Physical      CHIEF COMPLAINT:  fall, right leg pain    History of Present Illness:  89 y.o. female with a history of AFib, HTN, HLD, CKD, breast CA, melanoma presents with mechanical fall while ambulating with walker to her bathroom.  Reports her walker slid sideways and she was unable to stop it, lost her balance and  hit her right leg on the walker.  She noticed swelling of her RLE, family called her PCP who recommended cold compress and wrapping the leg.  She fell a couple of weeks ago, her PCP told her she likely had a right sided rib fracture, and also a left leg blister which turned into a wound.  When she saw her PCP yesterday she was told the LLE had an infected wound and was started on mupiricin ointment and doxycycyline.  She has noticed improvement in the wound already.  She is on chronic Eliquis.  Workup in ED significant for creatinine 2.1, hgb 9.6, INR 1.6.  CT head, c-spine no acute finding.  X-ray tib/fib large hematoma long lateral right mid calf, no acute fracture, severe OA.  Ortho c/s from ED.  Plan for I&D of right leg hematoma tomorrow.      Informant(s) for H&P: patient, chart    REVIEW OF SYSTEMS:  no fevers, chills, cp, sob, n/v, ha, vision/hearing changes, wt changes, hot/cold flashes, other open skin lesions, diarrhea, constipation, dysuria/hematuria unless noted in HPI. Complete ROS performed with the patient and is otherwise negative.      PMH:  Past Medical History:   Diagnosis Date    Breast cancer (HCC)     Cancer (HCC)     melonoma - back; left breast CA    Chronic back pain     CKD (chronic kidney disease) stage 3, GFR 30-59 ml/min (HCC)     Hyperlipidemia     Hypertension     Macular degeneration     Osteoarthritis     Restless legs syndrome        Surgical History:  Past Surgical History:   Procedure Laterality Date    BREAST LUMPECTOMY      BREAST SURGERY Left 6/3/15    lumpectomy    CATARACT EXTRACTION W/

## 2024-01-29 LAB
ALBUMIN SERPL-MCNC: 3 G/DL (ref 3.5–5.2)
ALP SERPL-CCNC: 77 U/L (ref 35–104)
ALT SERPL-CCNC: 9 U/L (ref 0–32)
ANION GAP SERPL CALCULATED.3IONS-SCNC: 11 MMOL/L (ref 7–16)
AST SERPL-CCNC: 20 U/L (ref 0–31)
BASOPHILS # BLD: 0 K/UL (ref 0–0.2)
BASOPHILS NFR BLD: 0 % (ref 0–2)
BILIRUB SERPL-MCNC: 0.2 MG/DL (ref 0–1.2)
BUN SERPL-MCNC: 48 MG/DL (ref 6–23)
CALCIUM SERPL-MCNC: 8.9 MG/DL (ref 8.6–10.2)
CHLORIDE SERPL-SCNC: 106 MMOL/L (ref 98–107)
CO2 SERPL-SCNC: 23 MMOL/L (ref 22–29)
CREAT SERPL-MCNC: 2 MG/DL (ref 0.5–1)
EKG ATRIAL RATE: 267 BPM
EKG Q-T INTERVAL: 446 MS
EKG QRS DURATION: 76 MS
EKG QTC CALCULATION (BAZETT): 448 MS
EKG R AXIS: 20 DEGREES
EKG T AXIS: 59 DEGREES
EKG VENTRICULAR RATE: 61 BPM
EOSINOPHIL # BLD: 0 K/UL (ref 0.05–0.5)
EOSINOPHILS RELATIVE PERCENT: 0 % (ref 0–6)
ERYTHROCYTE [DISTWIDTH] IN BLOOD BY AUTOMATED COUNT: 15.7 % (ref 11.5–15)
GFR SERPL CREATININE-BSD FRML MDRD: 23 ML/MIN/1.73M2
GLUCOSE SERPL-MCNC: 143 MG/DL (ref 74–99)
HCT VFR BLD AUTO: 25.9 % (ref 34–48)
HGB BLD-MCNC: 8.1 G/DL (ref 11.5–15.5)
LYMPHOCYTES NFR BLD: 0.25 K/UL (ref 1.5–4)
LYMPHOCYTES RELATIVE PERCENT: 6 % (ref 20–42)
MCH RBC QN AUTO: 30.7 PG (ref 26–35)
MCHC RBC AUTO-ENTMCNC: 31.3 G/DL (ref 32–34.5)
MCV RBC AUTO: 98.1 FL (ref 80–99.9)
MONOCYTES NFR BLD: 0.04 K/UL (ref 0.1–0.95)
MONOCYTES NFR BLD: 1 % (ref 2–12)
NEUTROPHILS NFR BLD: 93 % (ref 43–80)
NEUTS SEG NFR BLD: 3.72 K/UL (ref 1.8–7.3)
PLATELET # BLD AUTO: 159 K/UL (ref 130–450)
PMV BLD AUTO: 11 FL (ref 7–12)
POTASSIUM SERPL-SCNC: 4.7 MMOL/L (ref 3.5–5)
PROT SERPL-MCNC: 5.3 G/DL (ref 6.4–8.3)
RBC # BLD AUTO: 2.64 M/UL (ref 3.5–5.5)
RBC # BLD: NORMAL 10*6/UL
SODIUM SERPL-SCNC: 140 MMOL/L (ref 132–146)
WBC OTHER # BLD: 4 K/UL (ref 4.5–11.5)

## 2024-01-29 PROCEDURE — 2580000003 HC RX 258: Performed by: FAMILY MEDICINE

## 2024-01-29 PROCEDURE — 85025 COMPLETE CBC W/AUTO DIFF WBC: CPT

## 2024-01-29 PROCEDURE — 93010 ELECTROCARDIOGRAM REPORT: CPT | Performed by: INTERNAL MEDICINE

## 2024-01-29 PROCEDURE — 6360000002 HC RX W HCPCS

## 2024-01-29 PROCEDURE — 1200000000 HC SEMI PRIVATE

## 2024-01-29 PROCEDURE — 36415 COLL VENOUS BLD VENIPUNCTURE: CPT

## 2024-01-29 PROCEDURE — 99232 SBSQ HOSP IP/OBS MODERATE 35: CPT | Performed by: INTERNAL MEDICINE

## 2024-01-29 PROCEDURE — 2580000003 HC RX 258

## 2024-01-29 PROCEDURE — 80053 COMPREHEN METABOLIC PANEL: CPT

## 2024-01-29 PROCEDURE — 97165 OT EVAL LOW COMPLEX 30 MIN: CPT

## 2024-01-29 PROCEDURE — 6360000002 HC RX W HCPCS: Performed by: INTERNAL MEDICINE

## 2024-01-29 PROCEDURE — 97535 SELF CARE MNGMENT TRAINING: CPT

## 2024-01-29 PROCEDURE — 6370000000 HC RX 637 (ALT 250 FOR IP): Performed by: FAMILY MEDICINE

## 2024-01-29 RX ADMIN — HEPARIN SODIUM 5000 UNITS: 5000 INJECTION INTRAVENOUS; SUBCUTANEOUS at 05:31

## 2024-01-29 RX ADMIN — Medication 2000 UNITS: at 08:58

## 2024-01-29 RX ADMIN — ATORVASTATIN CALCIUM 20 MG: 20 TABLET, FILM COATED ORAL at 08:57

## 2024-01-29 RX ADMIN — HEPARIN SODIUM 5000 UNITS: 5000 INJECTION INTRAVENOUS; SUBCUTANEOUS at 17:19

## 2024-01-29 RX ADMIN — SACUBITRIL AND VALSARTAN 1 TABLET: 24; 26 TABLET, FILM COATED ORAL at 20:47

## 2024-01-29 RX ADMIN — Medication 10 ML: at 21:00

## 2024-01-29 RX ADMIN — MUPIROCIN: 20 OINTMENT TOPICAL at 23:35

## 2024-01-29 RX ADMIN — HEPARIN SODIUM 5000 UNITS: 5000 INJECTION INTRAVENOUS; SUBCUTANEOUS at 23:30

## 2024-01-29 RX ADMIN — DOXYCYCLINE HYCLATE 100 MG: 100 CAPSULE ORAL at 20:48

## 2024-01-29 RX ADMIN — DOXYCYCLINE HYCLATE 100 MG: 100 CAPSULE ORAL at 08:58

## 2024-01-29 RX ADMIN — MUPIROCIN: 20 OINTMENT TOPICAL at 08:58

## 2024-01-29 RX ADMIN — CEFAZOLIN 2000 MG: 2 INJECTION, POWDER, FOR SOLUTION INTRAMUSCULAR; INTRAVENOUS at 03:22

## 2024-01-29 ASSESSMENT — PAIN DESCRIPTION - LOCATION: LOCATION: LEG

## 2024-01-29 ASSESSMENT — PAIN DESCRIPTION - DESCRIPTORS: DESCRIPTORS: ACHING

## 2024-01-29 ASSESSMENT — PAIN DESCRIPTION - ORIENTATION: ORIENTATION: LEFT;ANTERIOR

## 2024-01-29 ASSESSMENT — PAIN SCALES - GENERAL: PAINLEVEL_OUTOF10: 2

## 2024-01-29 NOTE — FLOWSHEET NOTE
Inpatient Wound Care    Admit Date: 1/27/2024  5:10 PM    Reason for consult:  right leg    Significant history:    Past Medical History:   Diagnosis Date    Breast cancer (HCC)     Cancer (HCC)     melonoma - back; left breast CA    Chronic back pain     CKD (chronic kidney disease) stage 3, GFR 30-59 ml/min (HCC)     Hyperlipidemia     Hypertension     Macular degeneration     Osteoarthritis     Restless legs syndrome        Wound history:      Findings:     01/29/24 0956   Skin Integumentary    Skin Integumentary (WDL) X   Skin Integrity Site 3   Skin Integrity Location 3 Redness    Location 3 bilateral heels     Dressing to right leg intact ace is intact  Left leg skin tear dressing changed per staff intact    Impression:  redness bilateral heels blanchable    Interventions in place:  dressing to right and left leg    Plan:  Pt will need ongoing wound care to leg  Off load heels with heel protectors  Low air loss abhi Shah RN 1/29/2024 9:57 AM

## 2024-01-29 NOTE — PROGRESS NOTES
Department of Orthopedic Surgery  Resident Progress Note    Patient seen and examined at bedside this morning.  Patient resting comfortably. Pain controlled. No new complaints.  Denies chest pain, shortness of breath, dizziness/lightheadedness.    VITALS:  BP (!) 144/54   Pulse 55   Temp 98.1 °F (36.7 °C) (Oral)   Resp 18   Ht 1.575 m (5' 2\")   Wt 59.9 kg (132 lb)   SpO2 96%   BMI 24.14 kg/m²     General: alert and oriented to person, place and time, well-developed and well-nourished, in no acute distress    MUSCULOSKELETAL:   right lower extremity:  Dressing C/D/I  Compartments soft and compressible  +PF/DF/EHL  +2/4 DP & PT pulses, Brisk Cap refill, Toes warm and perfused  Distal sensation grossly intact to Peroneals, Sural, Saphenous, and tibial nrs    CBC:   Lab Results   Component Value Date/Time    WBC 4.0 01/29/2024 04:40 AM    HGB 8.1 01/29/2024 04:40 AM    HCT 25.9 01/29/2024 04:40 AM     01/29/2024 04:40 AM     PT/INR:    Lab Results   Component Value Date/Time    PROTIME 17.8 01/27/2024 06:01 PM    INR 1.6 01/27/2024 06:01 PM         ASSESSMENT  S/P right leg hematoma decompression with irrigation debridement on 1/28    PLAN      Continue physical therapy and protocol: WBAT -right LE  24 hour abx coverage  Deep venous thrombosis prophylaxis -Per primary, early mobilization  PT/OT  Pain Control: IV and PO  D/C Plan: Pending physical therapy and social work recommendations. Orthopedics will continue to follow peripherally. Please reach out with any questions.      Electronically signed by Kelby Bower DO on 1/29/2024 at 7:01 AM  Note: This report was completed using [employer] voiced recognition software.  Every effort has been made to ensure accuracy; however, inadvertent computerized transcription errors may be present.

## 2024-01-29 NOTE — CARE COORDINATION
Case Management Assessment  Initial Evaluation    Date/Time of Evaluation: 1/29/2024 11:08 AM  Assessment Completed by: Wendy Holder RN    If patient is discharged prior to next notation, then this note serves as note for discharge by case management.    Patient Name: Mary Khan                   YOB: 1928  Diagnosis: Hematoma [T14.8XXA]  Injury of head, initial encounter [S09.90XA]  Fall, initial encounter [W19.XXXA]                   Date / Time: 1/27/2024  5:10 PM    Patient Admission Status: Inpatient   Readmission Risk (Low < 19, Mod (19-27), High > 27): Readmission Risk Score: 20.2    Current PCP: Sandeep Ewing MD  PCP verified by CM? Yes    Chart Reviewed: Yes      History Provided by: Patient, Child/Family, Medical Record  Patient Orientation: Alert and Oriented, Person, Place, Situation    Patient Cognition: Alert    Hospitalization in the last 30 days (Readmission):  No    If yes, Readmission Assessment in CM Navigator will be completed.    Advance Directives:      Code Status: Full Code   Patient's Primary Decision Maker is: Named in Scanned ACP Document    Primary Decision Maker: Georgi Khan - [place] - [phone]    Discharge Planning:    Patient lives with: Alone Type of Home: House  Primary Care Giver: Self  Patient Support Systems include: Children   Current Financial resources:    Current community resources:    Current services prior to admission: None            Current DME:              Type of Home Care services:  None    ADLS  Prior functional level: Independent in ADLs/IADLs  Current functional level: Independent in ADLs/IADLs    PT AM-PAC: 13 /24  OT AM-PAC:   /24    Family can provide assistance at DC: No  Would you like Case Management to discuss the discharge plan with any other family members/significant others, and if so, who? Yes (son)  Plans to Return to Present Housing: No    Potential Assistance needed at discharge: N/A            Potential DME:

## 2024-01-29 NOTE — CARE COORDINATION
CM note: per liaison, possible bed Wednesday.  Referral left for [facility] liaison, await review/determination. NO PRECERT needed however patient will need a 3 day qualifying inpatient hospital stay which will be completed on 1/30/24.  Spoke with patient's son, he is in agreement with [facility].  Electronically signed by Wendy Holder RN on 1/29/2024 at 11:38 AM

## 2024-01-29 NOTE — PROGRESS NOTES
Admitting Date and Time: 1/27/2024  5:10 PM  Admit Dx: Hematoma [T14.8XXA]  Injury of head, initial encounter [S09.90XA]  Fall, initial encounter [W19.XXXA]    Subjective:    Pt feels upset in her belly since she \"made a mess\" \"by spilling her water\"  Per RN: np issues    ROS: denies fever, chills, cp, sob, n/v, HA unless stated above.     sodium chloride flush  5-40 mL IntraVENous 2 times per day    heparin (porcine)  5,000 Units SubCUTAneous 3 times per day    sodium chloride flush  5-40 mL IntraVENous 2 times per day    atorvastatin  20 mg Oral Daily    Vitamin D  2,000 Units Oral Daily    [Held by provider] bumetanide  1 mg Oral Daily    amiodarone  100 mg Oral Daily    metoprolol succinate  25 mg Oral Daily    sacubitril-valsartan  1 tablet Oral BID    doxycycline hyclate  100 mg Oral 2 times per day    mupirocin   Topical BID     sodium chloride flush, 5-40 mL, PRN  sodium chloride, , PRN  ondansetron, 4 mg, Q8H PRN   Or  ondansetron, 4 mg, Q6H PRN  sodium chloride flush, 5-40 mL, PRN  sodium chloride, , PRN  acetaminophen, 650 mg, Q6H PRN   Or  acetaminophen, 650 mg, Q6H PRN  HYDROcodone-acetaminophen, 1 tablet, Q6H PRN         Objective:    /64   Pulse 52   Temp 98.1 °F (36.7 °C) (Oral)   Resp 18   Ht 1.575 m (5' 2\")   Wt 59.9 kg (132 lb)   SpO2 96%   BMI 24.14 kg/m²   General Appearance: alert and oriented to person, place and time and in no acute distress  Skin: warm and dry  Head: normocephalic and atraumatic  Eyes: pupils equal, round, and reactive to light, extraocular eye movements intact, conjunctivae normal  Neck: neck supple and non tender without mass   Pulmonary/Chest: clear to auscultation bilaterally- no wheezes, rales or rhonchi, normal air movement, no respiratory distress  Cardiovascular: normal rate, normal S1 and S2 and no carotid bruits  Abdomen: soft, non-tender, non-distended, normal bowel sounds, no masses or organomegaly  Extremities: no cyanosis, no clubbing and no

## 2024-01-29 NOTE — ACP (ADVANCE CARE PLANNING)
Advance Care Planning   Healthcare Decision Maker:    Primary Decision Maker: KotademiGeorgi pillai - [place] - [phone]      Today we documented Decision Maker(s) consistent with ACP documents on file.

## 2024-01-29 NOTE — CONSULTS
Today's Date: 1/29/2024  Patient Name: Mary Khan  Date of admission: 1/27/2024  5:10 PM  Patient's age: 89 y.o., 9/3/1928female    Admission Dx: Hematoma [T14.8XXA]  Injury of head, initial encounter [S09.90XA]  Fall, initial encounter [W19.XXXA]    Requesting Physician: Maria Eugenia Ortiz DO    Chief Complaint   Patient presents with    Fall     Mechanical fall this AM, +head injury, on eliquis. Large hematoma to right lower leg       HISTORY OF PRESENT ILLNESS:      89-year-old pleasant lady that has history of chronic atrial fibrillation, chronic hypertension, hypercholesterolemia, and other problems as below, presented to the after she had a fall and trauma to leg that resulted in hematoma.  She had evacuation of the hematoma and debridement of the wound done yesterday successfully.  She has been on Eliquis 2.5 mg by mouth twice a day which has been on hold.  She denied passing having any significant dizziness.      REVIEW OF SYSTEMS:    A comprehensive 14 point review of systems was negative except for what is in the HPI       Past Medical History:   has a past medical history of Breast cancer (HCC), Cancer (HCC), Chronic back pain, CKD (chronic kidney disease) stage 3, GFR 30-59 ml/min (HCC), Hyperlipidemia, Hypertension, Macular degeneration, Osteoarthritis, and Restless legs syndrome.    Past Surgical History:   has a past surgical history that includes Hysterectomy; Mohs surgery; Colonoscopy (5/27/15); Breast surgery (Left, 6/3/15); Cataract removal with implant (Right, 3 8 16); Breast lumpectomy; Eye surgery (Right, 1/28/2022); and knee surgery (Right, 1/28/2024).     Social History:   reports that she has never smoked. She has never used smokeless tobacco. She reports that she does not drink alcohol and does not use drugs.     Family History:    family history includes Breast Cancer in her mother; Cancer in her mother..     Allergies:  Flagyl [metronidazole], Adhesive tape, and Penicillin

## 2024-01-29 NOTE — PROGRESS NOTES
OCCUPATIONAL THERAPY INITIAL EVALUATION    [facility]  [address] [place]. [place]        Date:2024                                                  Patient Name: Mary Khan    MRN: [id number]    : 9/3/1928    Room: [address] [place] [address]-      Evaluating OT: Tammie Meza OTR/L; [id number]     Referring Provider and Specific Provider Orders/Date:      24  OT eval and treat  Start:  24,   End:  24,   ONE TIME,   Standing Count:  1 Occurrences,   R         Maria Eugenia Ortiz, DO      Placement Recommendation: Subacute        Diagnosis:   1. Hematoma    2. Fall, initial encounter    3. Injury of head, initial encounter         Surgery:   right leg hematoma decompression with irrigation debridement on       Pertinent Medical History:       Past Medical History:   Diagnosis Date    Breast cancer (HCC)     Cancer (HCC)     melonoma - back; left breast CA    Chronic back pain     CKD (chronic kidney disease) stage 3, GFR 30-59 ml/min (HCC)     Hyperlipidemia     Hypertension     Macular degeneration     Osteoarthritis     Restless legs syndrome          Past Surgical History:   Procedure Laterality Date    BREAST LUMPECTOMY      BREAST SURGERY Left 6/3/15    lumpectomy    CATARACT REMOVAL WITH IMPLANT Right 3 8 16    COLONOSCOPY  5/27/15    EYE SURGERY Right 2022    PARS PLANA VITRECTOMY, ENDO LASER, GAS FLUID EXCHANGE, 25 G, MAC, RIGHT EYE performed by Adria Ibrahim MD at [facility] OR    HYSTERECTOMY (CERVIX STATUS UNKNOWN)      with uterus suspension.    KNEE SURGERY Right 2024    KNEE INCISION AND DRAINAGE performed by Silverio Donohue MD at [facility] OR    MOHS SURGERY        Precautions:  Fall Risk, WBAT: R LE d/t right leg hematoma decompression with irrigation debridement on       Assessment of current deficits:     [x] Functional mobility  [x]ADLs  [x] Strength               []Cognition    [x] Functional  chair.   Transfer training with verbal cues for hand placement throughout session to improve safety.   Independent    Functional Mobility Minimal Assist with wheeled walker to improve balance to and from bathroom, verbal cues for walker sequence and safety.   Modified Wake    Balance Sitting:     Static: good     Dynamic: fair   Standing: fair  with wheeled walker  Sitting:     Static: good     Dynamic: good   Standing: good  with wheeled walker   Activity Tolerance Fair   good    Visual/  Perceptual Glasses: yes                 Hand Dominance: right      AROM (PROM) Strength Additional Info:  Goal:   RUE  WFL 4/5 good  and wfl FMC/dexterity noted during ADL tasks   Improve overall RUE strength  for participation in functional tasks   LUE WFL 4/5 good  and wfl FMC/dexterity noted during ADL tasks   Improve overall LUE strength  for participation in functional tasks     Hearing: WFL   Sensation:  No c/o numbness or tingling  Tone: WFL   Edema: yes, B LE's    Comments: Upon arrival the patient was supine.  At end of session, patient was returned to supine with call light and phone within reach, all lines and tubes intact.  Overall patient demonstrated decreased independence and safety during completion of ADL/functional transfer/mobility tasks.  Pt would benefit from continued skilled OT to increase safety and independence with completion of ADL/IADL tasks for functional independence and quality of life.    Treatment: OT treatment provided this date includes:  Instruction/training on safety and adapted techniques for completion of ADLs  Instruction/training on safe functional mobility/transfer techniques  Instruction/training on energy conservation/work simplification for completion of ADL's  Instruction/training on proper positioning/alignment to prevent contractures   Instruction/training in weight bearing status and walker sequence    Rehab Potential: Good for established goals.      Patient /

## 2024-01-30 PROCEDURE — 97110 THERAPEUTIC EXERCISES: CPT

## 2024-01-30 PROCEDURE — 6370000000 HC RX 637 (ALT 250 FOR IP): Performed by: FAMILY MEDICINE

## 2024-01-30 PROCEDURE — 97530 THERAPEUTIC ACTIVITIES: CPT

## 2024-01-30 PROCEDURE — 6360000002 HC RX W HCPCS: Performed by: INTERNAL MEDICINE

## 2024-01-30 PROCEDURE — 99232 SBSQ HOSP IP/OBS MODERATE 35: CPT | Performed by: INTERNAL MEDICINE

## 2024-01-30 PROCEDURE — 6370000000 HC RX 637 (ALT 250 FOR IP)

## 2024-01-30 PROCEDURE — 1200000000 HC SEMI PRIVATE

## 2024-01-30 PROCEDURE — 2580000003 HC RX 258: Performed by: FAMILY MEDICINE

## 2024-01-30 PROCEDURE — 2580000003 HC RX 258

## 2024-01-30 RX ADMIN — SODIUM CHLORIDE, PRESERVATIVE FREE 10 ML: 5 INJECTION INTRAVENOUS at 21:34

## 2024-01-30 RX ADMIN — ONDANSETRON 4 MG: 4 TABLET, ORALLY DISINTEGRATING ORAL at 18:12

## 2024-01-30 RX ADMIN — SODIUM CHLORIDE, PRESERVATIVE FREE 10 ML: 5 INJECTION INTRAVENOUS at 14:05

## 2024-01-30 RX ADMIN — Medication 10 ML: at 21:34

## 2024-01-30 RX ADMIN — Medication 2000 UNITS: at 14:03

## 2024-01-30 RX ADMIN — ATORVASTATIN CALCIUM 20 MG: 20 TABLET, FILM COATED ORAL at 14:03

## 2024-01-30 RX ADMIN — AMIODARONE HYDROCHLORIDE 100 MG: 200 TABLET ORAL at 14:03

## 2024-01-30 RX ADMIN — DOXYCYCLINE HYCLATE 100 MG: 100 CAPSULE ORAL at 14:03

## 2024-01-30 RX ADMIN — METOPROLOL SUCCINATE 25 MG: 25 TABLET, EXTENDED RELEASE ORAL at 14:03

## 2024-01-30 RX ADMIN — MUPIROCIN: 20 OINTMENT TOPICAL at 21:36

## 2024-01-30 RX ADMIN — HEPARIN SODIUM 5000 UNITS: 5000 INJECTION INTRAVENOUS; SUBCUTANEOUS at 21:30

## 2024-01-30 RX ADMIN — HEPARIN SODIUM 5000 UNITS: 5000 INJECTION INTRAVENOUS; SUBCUTANEOUS at 06:19

## 2024-01-30 RX ADMIN — HEPARIN SODIUM 5000 UNITS: 5000 INJECTION INTRAVENOUS; SUBCUTANEOUS at 14:04

## 2024-01-30 RX ADMIN — DOXYCYCLINE HYCLATE 100 MG: 100 CAPSULE ORAL at 21:29

## 2024-01-30 RX ADMIN — HYDROCODONE BITARTRATE AND ACETAMINOPHEN 1 TABLET: 7.5; 325 TABLET ORAL at 14:03

## 2024-01-30 RX ADMIN — SACUBITRIL AND VALSARTAN 1 TABLET: 24; 26 TABLET, FILM COATED ORAL at 14:03

## 2024-01-30 RX ADMIN — MUPIROCIN: 20 OINTMENT TOPICAL at 14:04

## 2024-01-30 RX ADMIN — SACUBITRIL AND VALSARTAN 1 TABLET: 24; 26 TABLET, FILM COATED ORAL at 21:30

## 2024-01-30 ASSESSMENT — PAIN DESCRIPTION - LOCATION: LOCATION: LEG

## 2024-01-30 ASSESSMENT — PAIN SCALES - GENERAL
PAINLEVEL_OUTOF10: 0
PAINLEVEL_OUTOF10: 0
PAINLEVEL_OUTOF10: 8

## 2024-01-30 ASSESSMENT — PAIN DESCRIPTION - DESCRIPTORS: DESCRIPTORS: ACHING

## 2024-01-30 NOTE — PROGRESS NOTES
Date:  1/30/2024  Patient: Mary Khan  Admission:  1/27/2024  5:10 PM  Admit DX: Hematoma [T14.8XXA]  Injury of head, initial encounter [S09.90XA]  Fall, initial encounter [W19.XXXA]  Age:  89 y.o., 9/3/1928     LOS: 3 days       SUBJECTIVE:    Denies any cardiac symptoms  No orthopnea.  Vitals are stable.  Afebrile.          OBJECTIVE:    BP (!) 145/62   Pulse 57   Temp 97.9 °F (36.6 °C) (Oral)   Resp 18   Ht 1.575 m (5' 2\")   Wt 59.9 kg (132 lb)   SpO2 97%   BMI 24.14 kg/m²     Intake/Output Summary (Last 24 hours) at 1/30/2024 1701  Last data filed at 1/30/2024 1336  Gross per 24 hour   Intake 480 ml   Output --   Net 480 ml       EXAM:   Neck: Supple, No carotid bruit, No jugular venous distention, No lymphadenopathy.  Respiratory: Lungs are clear to auscultation, Respirations are non-labored, Breath sounds are equal, Symmetrical chest wall expansion.  Cardiovascular: Normal rate, No murmur, No gallop, Good pulses equal in all extremities, No edema.  Gastrointestinal: Soft, Non-tender, Non-distended, Normal bowel sounds.  Musculoskeletal: Normal strength, No tenderness, No deformity.  Right lower extremity is wrapped and is swollen.  Integumentary:  Warm, Dry.  Right leg hematoma   Neurologic: Alert, Oriented, No focal deficits.  Cognition and Speech: Oriented, Speech clear and coherent.  Psychiatric: Cooperative, Appropriate mood & affect, Normal judgment.    Current Inpatient Medications:   sodium chloride flush  5-40 mL IntraVENous 2 times per day    heparin (porcine)  5,000 Units SubCUTAneous 3 times per day    sodium chloride flush  5-40 mL IntraVENous 2 times per day    atorvastatin  20 mg Oral Daily    Vitamin D  2,000 Units Oral Daily    [Held by provider] bumetanide  1 mg Oral Daily    amiodarone  100 mg Oral Daily    metoprolol succinate  25 mg Oral Daily    sacubitril-valsartan  1 tablet Oral BID    doxycycline hyclate  100 mg Oral 2 times per day    mupirocin   Topical BID       IV

## 2024-01-30 NOTE — PROGRESS NOTES
Physical Therapy    Physical Therapy Treatment Note/Plan of Care    Room #:  0318/0318-01  Patient Name: Mary Khan  YOB: 1928  MRN: [id number]    Date of Service: 1/30/2024     Tentative placement recommendation: Subacute Rehab  Equipment recommendation: Patient has needed equipment       Evaluating Physical Therapist: Franco Dickey, PT  #[id number]      Specific Provider Orders/Date/Referring Provider :     PT evaluation and treat  Start:  01/27/24 2000,   End:  01/27/24 2000,   ONE TIME,   Standing Count:  1 Occurrences,   R       Angel, Maria Eugenia COHEN DO    Admitting Diagnosis:   Hematoma [T14.8XXA]  Injury of head, initial encounter [S09.90XA]  Fall, initial encounter [W19.XXXA]    Admitted with    fall and hematoma  Surgery: Date of Procedure: 1/28/2024   Pre operative diagnosis: Right leg hematoma with skin necrosis   Procedure:  Right leg hematoma decompression  Irrigation and debridement of 15cm by 30cm wound down to fascia     Surgeon(s):  Silverio Donohue MD  Visit Diagnoses         Codes    Injury of head, initial encounter     S09.90XA            Patient Active Problem List   Diagnosis    Breast cancer (HCC)    Hypertension    Malignant neoplasm of lower-outer quadrant of left female breast (HCC)    Cellulitis of lower extremity    Hyperlipidemia    Chronic back pain    Non-healing ulcer (HCC)    CKD (chronic kidney disease) stage 4, GFR 15-29 ml/min (HCC)    Chronic diastolic heart failure (HCC)    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   Decreased strength, balance and endurance  increases    SPO2 at rest %  SPO2 during session %     Patient education  Patient educated on role of Physical Therapy, risks of immobility, safety and plan of care, importance of positional changes for oxygen exchange,  importance of mobility while in hospital , importance and purpose of adaptive device and adjusted to proper height for the patient., safety , and positioning for skin integrity and comfort     Patient response to education:   Pt verbalized understanding Pt demonstrated skill Pt requires further education in this area   Yes Partial Yes      Treatment:  Patient practiced and was instructed/facilitated in the following treatment: Patient    sitting in a chair at start of tx session.  Pt stood, ambulated in the hallway, and back to the chair. Pt educated on the importance of mobility, maintaining strength, endurance, and safety. Pt performed seated BUE exercises.     Therapeutic Exercises:  shoulder elevation, elbow flexion/extension, wrist flexion/extension, and finger flexion/extension, x 10 - 15 reps.        At end of session, patient in chair with alarm call light and phone within reach,  all lines and tubes intact, nursing notified.      Patient would benefit from continued skilled Physical Therapy to improve functional independence and quality of life.         Patient's/ family goals   rehab    Time in 08:20  Time out 08:43    Total Treatment Time  23 minutes        CPT codes:    Therapeutic activities (92420)   15 minutes  1 unit(s)  Therapeutic exercises (79218)   8 minutes  1 unit(s)    Blaise Tenorio  [facility]  LIC # [id number]

## 2024-01-30 NOTE — CARE COORDINATION
CM note; Pt has been accepted at [facility] bed should be available Wednesday 1/31.  NO PRE-CERT needed, HENS initiated and DEMETRIA will need completed/signed by physician.  Notified patient and her son, Georgi.

## 2024-01-30 NOTE — PROGRESS NOTES
Department of Orthopedic Surgery  Resident Progress Note    Patient seen and examined at bedside this morning.  Patient resting comfortably.  Pain well-controlled.  No new complaints at this time.  Denies chest pain, shortness of breath, dizziness/lightheadedness.    VITALS:  BP (!) 125/55   Pulse 55   Temp 97.9 °F (36.6 °C) (Oral)   Resp 18   Ht 1.575 m (5' 2\")   Wt 59.9 kg (132 lb)   SpO2 98%   BMI 24.14 kg/m²     General: alert and oriented to person, place and time, well-developed and well-nourished, in no acute distress    MUSCULOSKELETAL:   right lower extremity:  Dressing C/D/I  Compartments soft and compressible  +PF/DF/EHL  +2/4 DP & PT pulses, Brisk Cap refill, Toes warm and perfused  Distal sensation grossly intact to Peroneals, Sural, Saphenous, and tibial nrs    CBC:   Lab Results   Component Value Date/Time    WBC 4.0 01/29/2024 04:40 AM    HGB 8.1 01/29/2024 04:40 AM    HCT 25.9 01/29/2024 04:40 AM     01/29/2024 04:40 AM     PT/INR:    Lab Results   Component Value Date/Time    PROTIME 17.8 01/27/2024 06:01 PM    INR 1.6 01/27/2024 06:01 PM         ASSESSMENT  S/P right leg hematoma decompression with irrigation debridement on 1/28    PLAN      Continue physical therapy and protocol: WBAT -right LE  24 hour abx coverage, completed  Dressing changes to be completed today  Deep venous thrombosis prophylaxis -Per primary, patient okay to resume Eliquis early mobilization  PT/OT  Pain Control: IV and PO  D/C Plan: Pending physical therapy and social work recommendations      Electronically signed by Kelby Bower DO on 1/30/2024 at 10:28 AM  Note: This report was completed using GetPromotd voiced recognition software.  Every effort has been made to ensure accuracy; however, inadvertent computerized transcription errors may be present.

## 2024-01-30 NOTE — DISCHARGE INSTR - COC
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 ([facility]) I13.0    Hematoma T14.8XXA    Fall W19.XXXA       Isolation/Infection:   Isolation            No Isolation          Patient Infection Status       Infection Onset Added Last Indicated Last Indicated By Review Planned Expiration Resolved Resolved By    MRSA 05/16/22 05/19/22 05/16/22 Culture, Wound        Wound leg 5-16-22            Nurse Assessment:  Last Vital Signs: BP (!) 125/55   Pulse 55   Temp 97.9 °F (36.6 °C) (Oral)   Resp 18   Ht 1.575 m (5' 2\")   Wt 59.9 kg (132 lb)   SpO2 98%   BMI 24.14 kg/m²     Last documented pain score (0-10 scale): Pain Level: 2  Last Weight:   Wt Readings from Last 1 Encounters:   01/27/24 59.9 kg (132 lb)     Mental Status:  alert/oriented    IV Access:  - None    Nursing Mobility/ADLs:  Walking   Assisted  Transfer  Assisted  Bathing  Assisted  Dressing  Assisted  Toileting  Assisted  Feeding  Assisted  Med Admin  Assisted  Med Delivery   whole    Wound Care Documentation and Therapy:  Wound 05/16/22 Leg Left;Lower;Posterior (Active)   Number of days: 623       Wound 01/27/24 Pretibial Right (Active)   Dressing Status Clean;Dry;Intact 01/29/24 2000   Dressing/Treatment Ace wrap 01/29/24 2000   Wound Assessment Other (Comment) 01/29/24 2000   Drainage Amount None (dry) 01/29/24 2000   Number of days: 2       Wound 01/27/24 Pretibial Left (Active)   Dressing Status New dressing applied 01/29/24 2000   Wound Cleansed Cleansed with saline 01/29/24 2000   Dressing/Treatment Pharmaceutical agent (see MAR);ABD;Gauze dressing/dressing sponge 01/29/24 2000   Dressing Change Due 01/28/24 01/28/24 2000   Wound Assessment Pink/red 01/29/24 2000   Drainage Amount Small (< 25%) 01/29/24 0845   Drainage Description Sanguinous 01/29/24 0845   Odor None 01/29/24 2000   Brielle-wound Assessment Fragile 01/29/24 2000   Number of days: 2       Incision Pretibial Right (Active)   Dressing Status Clean;Dry;Intact 01/28/24 2000  Good    Recommended Labs or Other Treatments After Discharge: ***    Physician Certification: I certify the above information and transfer of Mary Khan  is necessary for the continuing treatment of the diagnosis listed and that she requires Skilled Nursing Facility for less 30 days.     Update Admission H&P: {CHP DME Changes in HandP:[id number]}    PHYSICIAN SIGNATURE:  {Esignature:[id number]}

## 2024-01-30 NOTE — PROGRESS NOTES
Admitting Date and Time: 1/27/2024  5:10 PM  Admit Dx: Hematoma [T14.8XXA]  Injury of head, initial encounter [S09.90XA]  Fall, initial encounter [W19.XXXA]    Subjective:    No new complaints  Per RN: np issues    ROS: denies fever, chills, cp, sob, n/v, HA unless stated above.     sodium chloride flush  5-40 mL IntraVENous 2 times per day    heparin (porcine)  5,000 Units SubCUTAneous 3 times per day    sodium chloride flush  5-40 mL IntraVENous 2 times per day    atorvastatin  20 mg Oral Daily    Vitamin D  2,000 Units Oral Daily    [Held by provider] bumetanide  1 mg Oral Daily    amiodarone  100 mg Oral Daily    metoprolol succinate  25 mg Oral Daily    sacubitril-valsartan  1 tablet Oral BID    doxycycline hyclate  100 mg Oral 2 times per day    mupirocin   Topical BID     sodium chloride flush, 5-40 mL, PRN  sodium chloride, , PRN  ondansetron, 4 mg, Q8H PRN   Or  ondansetron, 4 mg, Q6H PRN  sodium chloride flush, 5-40 mL, PRN  sodium chloride, , PRN  acetaminophen, 650 mg, Q6H PRN   Or  acetaminophen, 650 mg, Q6H PRN  HYDROcodone-acetaminophen, 1 tablet, Q6H PRN         Objective:    BP (!) 131/46   Pulse (!) 46   Temp 97.6 °F (36.4 °C) (Infrared)   Resp 18   Ht 1.575 m (5' 2\")   Wt 59.9 kg (132 lb)   SpO2 93%   BMI 24.14 kg/m²   General Appearance: alert and oriented to person, place and time and in no acute distress  Skin: warm and dry  Head: normocephalic and atraumatic  Eyes: pupils equal, round, and reactive to light, extraocular eye movements intact, conjunctivae normal  Neck: neck supple and non tender without mass   Pulmonary/Chest: clear to auscultation bilaterally- no wheezes, rales or rhonchi, normal air movement, no respiratory distress  Cardiovascular: normal rate, normal S1 and S2 and no carotid bruits  Abdomen: soft, non-tender, non-distended, normal bowel sounds, no masses or organomegaly  Extremities: no cyanosis, no clubbing and no  edema  Neurologic: no cranial nerve deficit and  speech normal      Recent Labs     01/27/24  1801 01/28/24  0414 01/29/24  0440    139 140   K 4.6 4.5 4.7    105 106   CO2 25 25 23   BUN 53* 51* 48*   CREATININE 2.1* 2.1* 2.0*   GLUCOSE 96 94 143*   CALCIUM 8.8 8.6 8.9         Recent Labs     01/29/24  0440   ALKPHOS 77   PROT 5.3*   LABALBU 3.0*   BILITOT 0.2   AST 20   ALT 9         Recent Labs     01/27/24  1801 01/28/24  0414 01/29/24  0440   WBC 4.3* 3.4* 4.0*   RBC 2.99* 2.63* 2.64*   HGB 9.6* 8.2* 8.1*   HCT 29.4* 26.0* 25.9*   MCV 98.3 98.9 98.1   MCH 32.1 31.2 30.7   MCHC 32.7 31.5* 31.3*   RDW 15.3* 15.6* 15.7*    139 159   MPV 10.6 11.1 11.0             Radiology:   XR CHEST PORTABLE   Final Result   No acute process.         CT Head W/O Contrast   Final Result   1.  No acute intracranial abnormality.      2.  Signs of deep white matter small vessel disease.         CT CSpine W/O Contrast   Final Result   No acute fracture or traumatic malalignment of the cervical spine.         XR TIBIA FIBULA RIGHT (2 VIEWS)   Final Result   1. No sign of fracture or dislocation of the tibia or fibula.   2. Large hematoma along the lateral aspect of the mid calf.   3. Severe primary osteoarthritis of the medial and lateral joint compartments   of the knee with moderate primary osteoarthritis of the patellofemoral   articulation.             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 *      Plan: History of present illness from History and Physical: Per Dr. Ortiz on 1/27:  89 y.o. female with a history of AFib, HTN, HLD, CKD, breast CA, melanoma presents with mechanical fall while ambulating with walker to her bathroom.  Reports her walker slid

## 2024-01-31 VITALS
HEIGHT: 62 IN | TEMPERATURE: 98.8 F | WEIGHT: 132 LBS | SYSTOLIC BLOOD PRESSURE: 124 MMHG | HEART RATE: 52 BPM | BODY MASS INDEX: 24.29 KG/M2 | OXYGEN SATURATION: 94 % | RESPIRATION RATE: 18 BRPM | DIASTOLIC BLOOD PRESSURE: 56 MMHG

## 2024-01-31 PROCEDURE — 6370000000 HC RX 637 (ALT 250 FOR IP): Performed by: FAMILY MEDICINE

## 2024-01-31 PROCEDURE — 6360000002 HC RX W HCPCS: Performed by: INTERNAL MEDICINE

## 2024-01-31 PROCEDURE — 2580000003 HC RX 258

## 2024-01-31 PROCEDURE — 97530 THERAPEUTIC ACTIVITIES: CPT

## 2024-01-31 PROCEDURE — 99239 HOSP IP/OBS DSCHRG MGMT >30: CPT | Performed by: INTERNAL MEDICINE

## 2024-01-31 PROCEDURE — 6360000002 HC RX W HCPCS

## 2024-01-31 RX ORDER — DOXYCYCLINE HYCLATE 100 MG
100 TABLET ORAL 2 TIMES DAILY
Qty: 4 TABLET | Refills: 0 | Status: SHIPPED | OUTPATIENT
Start: 2024-01-31 | End: 2024-02-02

## 2024-01-31 RX ADMIN — HEPARIN SODIUM 5000 UNITS: 5000 INJECTION INTRAVENOUS; SUBCUTANEOUS at 14:16

## 2024-01-31 RX ADMIN — DOXYCYCLINE HYCLATE 100 MG: 100 CAPSULE ORAL at 08:15

## 2024-01-31 RX ADMIN — SACUBITRIL AND VALSARTAN 1 TABLET: 24; 26 TABLET, FILM COATED ORAL at 08:15

## 2024-01-31 RX ADMIN — ONDANSETRON 4 MG: 2 INJECTION INTRAMUSCULAR; INTRAVENOUS at 10:40

## 2024-01-31 RX ADMIN — METOPROLOL SUCCINATE 25 MG: 25 TABLET, EXTENDED RELEASE ORAL at 08:15

## 2024-01-31 RX ADMIN — HEPARIN SODIUM 5000 UNITS: 5000 INJECTION INTRAVENOUS; SUBCUTANEOUS at 05:36

## 2024-01-31 RX ADMIN — MUPIROCIN: 20 OINTMENT TOPICAL at 08:22

## 2024-01-31 RX ADMIN — AMIODARONE HYDROCHLORIDE 100 MG: 200 TABLET ORAL at 08:15

## 2024-01-31 RX ADMIN — ATORVASTATIN CALCIUM 20 MG: 20 TABLET, FILM COATED ORAL at 08:15

## 2024-01-31 RX ADMIN — ACETAMINOPHEN 650 MG: 325 TABLET ORAL at 11:35

## 2024-01-31 RX ADMIN — Medication 2000 UNITS: at 08:15

## 2024-01-31 RX ADMIN — SODIUM CHLORIDE, PRESERVATIVE FREE 10 ML: 5 INJECTION INTRAVENOUS at 08:23

## 2024-01-31 ASSESSMENT — PAIN SCALES - GENERAL
PAINLEVEL_OUTOF10: 8
PAINLEVEL_OUTOF10: 0
PAINLEVEL_OUTOF10: 8

## 2024-01-31 ASSESSMENT — PAIN DESCRIPTION - ORIENTATION
ORIENTATION: RIGHT;LEFT
ORIENTATION: LEFT;RIGHT

## 2024-01-31 ASSESSMENT — PAIN DESCRIPTION - LOCATION
LOCATION: LEG
LOCATION: LEG

## 2024-01-31 ASSESSMENT — PAIN DESCRIPTION - DESCRIPTORS
DESCRIPTORS: ACHING
DESCRIPTORS: ACHING

## 2024-01-31 NOTE — DISCHARGE INSTRUCTIONS
Your information:  Name: Mary Khan  : 9/3/1928    Your instructions:  Discharge to [facility]    What to do after you leave the hospital:    Recommended diet: general, easy to chew    Recommended activity: activity as tolerated    The following personal items were collected during your admission and were returned to you:    Belongings  Dental Appliances: Uppers, Lowers  Vision - Corrective Lenses: Eyeglasses  Hearing Aid: Bilateral hearing aids  Clothing: Footwear, Jacket/Coat, Pants, Shirt, Undergarments, Sent home  Jewelry: None  Electronic Devices: None  Weapons (Notify Protective Services/Security): None  Home Medications: None  Valuables Given To: Family (Comment) ()  Provide Name(s) of Who Valuable(s) Were Given To: clarisse    Information obtained by:  By signing below, I understand that if any problems occur once I leave the hospital I am to contact PCP.  I understand and acknowledge receipt of the instructions indicated above.   Hematoma: Care Instructions  Overview     A hematoma is a bad bruise. It happens when an injury causes blood to collect and pool under the skin. The pooling blood gives the skin a spongy, rubbery, lumpy feel.  A hematoma usually is not a cause for concern. It is not the same thing as a blood clot in a vein, and it does not cause blood clots.  Follow-up care is a key part of your treatment and safety. Be sure to make and go to all appointments, and call your doctor if you are having problems. It's also a good idea to know your test results and keep a list of the medicines you take.  How can you care for yourself at home?  Rest and protect the bruised area.  Put ice or a cold pack on the area for 10 to 20 minutes at a time.  Prop up the bruised area on a pillow when you ice it or anytime you sit or lie down during the next 3 days. Try to keep it above the level of your heart. This will help reduce swelling.  Wrapping the bruised area with an elastic bandage such as

## 2024-01-31 NOTE — PROGRESS NOTES
Physical Therapy    Physical Therapy Treatment Note/Plan of Care    Room #:  0318/0318-01  Patient Name: Mary Khan  YOB: 1928  MRN: [id number]    Date of Service: 1/31/2024     Tentative placement recommendation: Subacute Rehab  Equipment recommendation: Patient has needed equipment       Evaluating Physical Therapist: Franco Dickey, PT  #[id number]      Specific Provider Orders/Date/Referring Provider :     PT evaluation and treat  Start:  01/27/24 2000,   End:  01/27/24 2000,   ONE TIME,   Standing Count:  1 Occurrences,   R       Angel, Maria Eugenia COHEN DO    Admitting Diagnosis:   Hematoma [T14.8XXA]  Injury of head, initial encounter [S09.90XA]  Fall, initial encounter [W19.XXXA]    Admitted with    fall and hematoma  Surgery: Date of Procedure: 1/28/2024   Pre operative diagnosis: Right leg hematoma with skin necrosis   Procedure:  Right leg hematoma decompression  Irrigation and debridement of 15cm by 30cm wound down to fascia     Surgeon(s):  Silverio Donohue MD  Visit Diagnoses         Codes    Injury of head, initial encounter     S09.90XA            Patient Active Problem List   Diagnosis    Breast cancer (HCC)    Hypertension    Malignant neoplasm of lower-outer quadrant of left female breast (HCC)    Cellulitis of lower extremity    Hyperlipidemia    Chronic back pain    Non-healing ulcer (HCC)    CKD (chronic kidney disease) stage 4, GFR 15-29 ml/min (HCC)    Chronic diastolic heart failure (HCC)    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   Decreased strength, balance and endurance  increases

## 2024-01-31 NOTE — FLOWSHEET NOTE
Inpatient Wound Care    Admit Date: 1/27/2024  5:10 PM    Reason for consult:  right and left leg    Significant history:    Past Medical History:   Diagnosis Date    Breast cancer (HCC)     Cancer (HCC)     melonoma - back; left breast CA    Chronic back pain     CKD (chronic kidney disease) stage 3, GFR 30-59 ml/min (HCC)     Hyperlipidemia     Hypertension     Macular degeneration     Osteoarthritis     Restless legs syndrome        Wound history:      Findings:     01/31/24 1300   Wound 01/27/24 Pretibial Right   Date First Assessed/Time First Assessed: 01/27/24 2251   Present on Original Admission: Yes  Location: Pretibial  Wound Location Orientation: Right   Wound Image     Wound Cleansed Cleansed with saline   Dressing/Treatment Xeroform   Wound Length (cm) 30 cm   Wound Width (cm) 15 cm   Wound Surface Area (cm^2) 450 cm^2   Wound Assessment Other (Comment)  (purplish dark)   Drainage Amount Moderate (25-50%)   Drainage Description Serosanguinous   Odor None   Wound 01/27/24 Pretibial Left   Date First Assessed/Time First Assessed: 01/27/24 2253   Present on Original Admission: Yes  Location: Pretibial  Wound Location Orientation: Left   Wound Image    Wound Cleansed Cleansed with saline   Dressing/Treatment Pharmaceutical agent (see MAR)   Wound Length (cm) 7 cm   Wound Width (cm) 4 cm   Wound Depth (cm) 0.2 cm   Wound Surface Area (cm^2) 28 cm^2   Wound Volume (cm^3) 5.6 cm^3   Wound Assessment Pink/red   Drainage Amount Small (< 25%)   Drainage Description Serosanguinous   Odor None   Brielle-wound Assessment Fragile       **Informed Consent**    The patient has given verbal consent to have photos taken of wounds and inserted into their chart as part of their permanent medical record for purposes of documentation, treatment management and/or medical review.   All Images taken on 1/31/24 of patient name: Mary Khan were transmitted and stored on secured Epic  Site located within Media Folder  Tab by a registered Epic-[employer] Mobile Application Device.       Impression:  surgical wound on right  Venous wound on left    Interventions in place:  left leg using bactroban  Right leg using xeroform dressing      Plan:  Plan follow up with wound care center  Cont with xeroform daily      Marcella Shah RN 1/31/2024 2:13 PM

## 2024-01-31 NOTE — DISCHARGE SUMMARY
[facility] Hospitalist       Hospitalist Physician Discharge Summary       [address] [address]  [phone]          Activity level: Slowly increase as tolerated    Diet: ADULT DIET; Easy to Chew; Low Fat/Low Chol/High Fiber/CHUNG    Labs: None are pending at the discharge    Condition at discharge: Stable    Dispo: facility    Patient ID:  Mary Khan  [id number]  89 y.o.  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 *      Consults:  IP CONSULT TO ORTHOPEDIC SURGERY  IP CONSULT TO SOCIAL WORK  IP CONSULT TO CARDIOLOGY    Procedures: None significant except if described in hospital course.    Hospital Course: History of present illness from History and Physical: Per Dr. Ortiz on 1/27:  89 y.o. female with a history of AFib, HTN, HLD, CKD, breast CA, melanoma presents with mechanical fall while ambulating with walker to her bathroom.  Reports her walker slid sideways and she was unable to stop it, lost her balance and  hit her right leg on the walker.  She noticed swelling of her RLE, family called her PCP who recommended cold compress and wrapping the leg.  She fell a couple  soft tissues.     1.  No acute intracranial abnormality. 2.  Signs of deep white matter small vessel disease.     XR TIBIA FIBULA RIGHT (2 VIEWS)    Result Date: 1/27/2024  EXAMINATION: 2 XRAY VIEWS OF THE RIGHT TIBIA AND FIBULA 1/27/2024 3:11 pm COMPARISON: None. HISTORY: ORDERING SYSTEM PROVIDED HISTORY: Fall, hematoma TECHNOLOGIST PROVIDED HISTORY: Reason for exam:->Fall, hematoma FINDINGS: There is no sign of fracture or destructive lesion of the tibia or fibula. The knee is intact with no sign of fracture or dislocation. There is severe primary osteoarthritis of the medial and lateral joint compartments of the knee with prominent narrowing of the joint space, moderate sclerosis of the articular surfaces, and moderate marginal osteophyte formation. There is moderate primary osteoarthritis of the patellofemoral articulation with moderate joint space narrowing and moderate marginal osteophyte formation. The ankle is intact with no sign of fracture or dislocation.  There is no sign of degenerative disease. There is a large hematoma located along the lateral aspect of the mid calf, measuring approximately 12.5 cm in length and 3.5 cm in thickness. There is moderate diffuse edema throughout the knee and calf, extending across the ankle.  There is moderate medial soft tissue swelling at the ankle.     1. No sign of fracture or dislocation of the tibia or fibula. 2. Large hematoma along the lateral aspect of the mid calf. 3. Severe primary osteoarthritis of the medial and lateral joint compartments of the knee with moderate primary osteoarthritis of the patellofemoral articulation.         Patient Instructions:   Current Discharge Medication List        START taking these medications    Details   oxyCODONE-acetaminophen (PERCOCET) 5-325 MG per tablet Take 1 tablet by mouth every 6 hours as needed for Pain for up to 7 days. Intended supply: 7 days. Take lowest dose possible to manage pain Max Daily Amount: 4

## 2024-01-31 NOTE — CARE COORDINATION
CM note: discharge order noted.  Transportation arranged with [facility] for wheelchair  at 4:30 PM today.  Facility liaison and patient's son notified of arrangements.  HENS completed, DEMETRIA needs signed by physician.

## 2024-01-31 NOTE — PROGRESS NOTES
Inpatient wound care note  Gave update on wound to Dr Bower  Discussed discharge plan  Will cont with xeroform dressing cover with dsd  Pt to see Dr Dial first than wound care center  Pt may need referred to plastics

## 2024-02-02 ENCOUNTER — OFFICE VISIT (OUTPATIENT)
Dept: ORTHOPEDIC SURGERY | Age: 89
End: 2024-02-02

## 2024-02-02 VITALS — WEIGHT: 132 LBS | BODY MASS INDEX: 24.29 KG/M2 | HEIGHT: 62 IN

## 2024-02-02 DIAGNOSIS — S80.11XD LEG HEMATOMA, RIGHT, SUBSEQUENT ENCOUNTER: ICD-10-CM

## 2024-02-02 DIAGNOSIS — Z47.89 ORTHOPEDIC AFTERCARE: Primary | ICD-10-CM

## 2024-02-02 PROCEDURE — 99024 POSTOP FOLLOW-UP VISIT: CPT | Performed by: ORTHOPAEDIC SURGERY

## 2024-02-02 ASSESSMENT — ENCOUNTER SYMPTOMS
SHORTNESS OF BREATH: 0
ALLERGIC/IMMUNOLOGIC NEGATIVE: 1
ABDOMINAL DISTENTION: 0
EYE DISCHARGE: 0

## 2024-02-02 NOTE — PROGRESS NOTES
Mary Khan (:  9/3/1928) is a 89 y.o. female,Established patient, here for evaluation of the following chief complaint(s):  Follow-up (Po right leg hematoma decompression and I&D. Presents in wheelchair from [facility]. Unaccompanied. 7/10 pain at time of visit. )         ASSESSMENT/PLAN:  1. Orthopedic aftercare  -     [facility]  2. Leg hematoma, right, subsequent encounter  -     [facility]    Wound at the time of surgery was [id number].  Skin overlying hematoma likely nonviable but used as biologic dressing.    Continue dressing changes  Needs outpatient wound clinic to help continue to heal large R leg wound  No recurrence of hematoma at this time      Return in about 6 weeks (around 3/15/2024).         Subjective   SUBJECTIVE/OBJECTIVE:  HPI    89 year old female 1 week s/p R leg I&D for large hematoma.  In facility.  No one is with her today.  Wound clinic has not started.    Past Medical History:   Diagnosis Date    Breast cancer (HCC)     Cancer (HCC)     melonoma - back; left breast CA    Chronic back pain     CKD (chronic kidney disease) stage 3, GFR 30-59 ml/min (HCC)     Hyperlipidemia     Hypertension     Macular degeneration     Osteoarthritis     Restless legs syndrome      Past Surgical History:   Procedure Laterality Date    BREAST LUMPECTOMY      BREAST SURGERY Left 6/3/15    lumpectomy    CATARACT REMOVAL WITH IMPLANT Right 3 8 16    COLONOSCOPY  5/27/15    EYE SURGERY Right 2022    PARS PLANA VITRECTOMY, ENDO LASER, GAS FLUID EXCHANGE, 25 G, MAC, RIGHT EYE performed by Adria Ibrahim MD at [facility] OR    HYSTERECTOMY (CERVIX STATUS UNKNOWN)      with uterus suspension.    KNEE SURGERY Right 2024    KNEE INCISION AND DRAINAGE performed by Silverio Donohue MD at [facility] OR    MOHS SURGERY        Family History   Problem Relation Age of Onset    Cancer Mother         breast, colon

## 2024-02-12 NOTE — DISCHARGE INSTRUCTIONS
Visit Discharge/Physician Orders    Discharge condition: Stable    Assessment of pain at discharge: moderate    Anesthetic used: 4% lidocaine    Discharge to: ECF    Left via:Private automobile    Accompanied by: family    ECF/[facility]:  [place] - new orders    Dressing Orders: Right leg: cleanse wound with normal saline, apply adaptic followed by plain alginate. Cover with abd's, secure with kerlix and tape. Change daily.  Left leg: cleanse wound with normal saline, apply xeroform and cover with abd's. Secure with kerlix and tape. Change daily.    Treatment Orders: Culture taken 2/13 - Begin taking Doxycycline 100mg by mouth twice a day for 10 days. Elevate legs above heart level as much as possible.  Eat foods high in protein and vitaminC to promote healing.    [facility] followup visit ________1 week_____________________  (Please note your next appointment above and if you are unable to keep, kindly give a 24 hour notice. Thank you.)    Physician signature:__________________________      If you experience any of the following, please call the Wound Care Center during business hours:    * Increase in Pain  * Temperature over 101  * Increase in drainage from your wound  * Drainage with a foul odor  * Bleeding  * Increase in swelling  * Need for compression bandage changes due to slippage, breakthrough drainage.    If you need medical attention outside of the business hours of the Wound Care Centers please contact your PCP or go to the nearest emergency room.

## 2024-02-13 ENCOUNTER — HOSPITAL ENCOUNTER (OUTPATIENT)
Dept: WOUND CARE | Age: 89
Discharge: HOME | End: 2024-02-13
Payer: MEDICARE

## 2024-02-13 VITALS
TEMPERATURE: 98 F | HEART RATE: 53 BPM | RESPIRATION RATE: 18 BRPM | SYSTOLIC BLOOD PRESSURE: 117 MMHG | DIASTOLIC BLOOD PRESSURE: 46 MMHG

## 2024-02-13 PROCEDURE — 87205 SMEAR GRAM STAIN: CPT

## 2024-02-13 PROCEDURE — 87070 CULTURE OTHR SPECIMN AEROBIC: CPT

## 2024-02-13 PROCEDURE — 11042 DBRDMT SUBQ TIS 1ST 20SQCM/<: CPT

## 2024-02-13 PROCEDURE — 87077 CULTURE AEROBIC IDENTIFY: CPT

## 2024-02-13 PROCEDURE — 99213 OFFICE O/P EST LOW 20 MIN: CPT

## 2024-02-13 RX ORDER — LIDOCAINE HYDROCHLORIDE 40 MG/ML
SOLUTION TOPICAL ONCE
Status: COMPLETED | OUTPATIENT
Start: 2024-02-13 | End: 2024-02-13

## 2024-02-13 RX ADMIN — LIDOCAINE HYDROCHLORIDE 25 ML: 40 SOLUTION TOPICAL at 09:15

## 2024-02-13 NOTE — PLAN OF CARE
Problem: Chronic Conditions and Co-morbidities  Goal: Patient's chronic conditions and co-morbidity symptoms are monitored and maintained or improved  Outcome: Progressing     Problem: Pain  Goal: Verbalizes/displays adequate comfort level or baseline comfort level  Outcome: Progressing     Problem: Cognitive:  Goal: Knowledge of wound care  Description: Knowledge of wound care  Outcome: Progressing  Goal: Understands risk factors for wounds  Description: Understands risk factors for wounds  Outcome: Progressing     Problem: Wound:  Goal: Will show signs of wound healing; wound closure and no evidence of infection  Description: Will show signs of wound healing; wound closure and no evidence of infection  Outcome: Progressing

## 2024-02-17 LAB
MICROORGANISM SPEC CULT: ABNORMAL
MICROORGANISM/AGENT SPEC: ABNORMAL
SPECIMEN DESCRIPTION: ABNORMAL

## 2024-02-19 NOTE — DISCHARGE INSTRUCTIONS
Visit Discharge/Physician Orders     Discharge condition: Stable     Assessment of pain at discharge: moderate     Anesthetic used: 4% lidocaine     Discharge to: ECF     Left via:Private automobile     Accompanied by: family     ECF/HHA:  [facility] - new orders     Dressing Orders: Right leg: cleanse wound with normal saline, apply adaptic followed by plain alginate. Cover with abd's, secure with kerlix and tape. Change daily.  Left leg: cleanse wound with normal saline, apply xeroform and cover with abd's. Secure with kerlix and tape. Change daily.     Treatment Orders: Culture taken 2/13 - Begin taking Doxycycline 100mg by mouth twice a day for 10 days. Elevate legs above heart level as much as possible.  Eat foods high in protein and vitaminC to promote healing.     C followup visit ________1 week_____________________  (Please note your next appointment above and if you are unable to keep, kindly give a 24 hour notice. Thank you.)     Physician signature:__________________________        If you experience any of the following, please call the Wound Care Center during business hours:     * Increase in Pain  * Temperature over 101  * Increase in drainage from your wound  * Drainage with a foul odor  * Bleeding  * Increase in swelling  * Need for compression bandage changes due to slippage, breakthrough drainage.

## 2024-02-20 ENCOUNTER — HOSPITAL ENCOUNTER (OUTPATIENT)
Dept: WOUND CARE | Age: 89
Discharge: HOME | End: 2024-02-20

## 2024-02-20 NOTE — DISCHARGE INSTRUCTIONS
Visit Discharge/Physician Orders     Discharge condition: Stable     Assessment of pain at discharge: moderate     Anesthetic used: 4% lidocaine     Discharge to: ECF     Left via:Private automobile     Accompanied by: family     ECF/HHA:  [facility] [place] -      Dressing Orders: Right leg: cleanse wound with normal saline, apply adaptic followed by plain alginate. Cover with ABD's, secure with kerlix and tape. Change daily.  In clinic - wrap leg from base of toes to back of knee with kerlix and coban    Treatment Orders:  Elevate legs above heart level as much as possible.  Eat foods high in protein and vitamin C to promote healing.     [facility] followup visit ________1 week_____________________  (Please note your next appointment above and if you are unable to keep, kindly give a 24 hour notice. Thank you.)     Physician signature:__________________________        If you experience any of the following, please call the Wound Care Center during business hours:     * Increase in Pain  * Temperature over 101  * Increase in drainage from your wound  * Drainage with a foul odor  * Bleeding  * Increase in swelling  * Need for compression bandage changes due to slippage, breakthrough drainage.

## 2024-02-27 ENCOUNTER — HOSPITAL ENCOUNTER (OUTPATIENT)
Dept: WOUND CARE | Age: 89
Discharge: HOME | End: 2024-02-27
Attending: PODIATRIST
Payer: MEDICARE

## 2024-02-27 VITALS
RESPIRATION RATE: 18 BRPM | DIASTOLIC BLOOD PRESSURE: 58 MMHG | TEMPERATURE: 97.6 F | SYSTOLIC BLOOD PRESSURE: 114 MMHG | HEART RATE: 47 BPM
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
## 2024-02-27 PROCEDURE — 11042 DBRDMT SUBQ TIS 1ST 20SQCM/<: CPT

## 2024-02-27 RX ORDER — LIDOCAINE 4 G/G
1 PATCH TOPICAL DAILY
COMMUNITY

## 2024-02-27 RX ORDER — POLYETHYLENE GLYCOL 3350 17 G/17G
17 POWDER, FOR SOLUTION ORAL DAILY
COMMUNITY

## 2024-02-27 RX ORDER — DOXYCYCLINE HYCLATE 100 MG/1
100 CAPSULE ORAL 2 TIMES DAILY
COMMUNITY

## 2024-02-27 ASSESSMENT — PAIN SCALES - GENERAL: PAINLEVEL_OUTOF10: 4

## 2024-02-27 ASSESSMENT — PAIN DESCRIPTION - ORIENTATION: ORIENTATION: RIGHT

## 2024-02-27 ASSESSMENT — PAIN DESCRIPTION - DESCRIPTORS: DESCRIPTORS: SHARP;SHOOTING;ACHING

## 2024-03-04 PROBLEM — L97.912 ULCER OF RIGHT LEG, WITH FAT LAYER EXPOSED (HCC): Status: ACTIVE | Noted: 2024-03-04

## 2024-03-04 PROBLEM — I89.0 LYMPHEDEMA: Status: ACTIVE | Noted: 2024-03-04

## 2024-03-04 NOTE — DISCHARGE INSTRUCTIONS
Visit Discharge/Physician Orders     Discharge condition: Stable     Assessment of pain at discharge: moderate     Anesthetic used: 4% lidocaine     Discharge to: ECF     Left via:Private automobile     Accompanied by: family     ECF/HHA:  [facility] [place] -      Dressing Orders: Right leg: cleanse wound with normal saline, apply adaptic followed by plain alginate. Cover with ABD's, secure with kerlix and tape. Change daily.  In clinic - wrap leg from base of toes to back of knee with kerlix and coban     Treatment Orders:  Elevate legs above heart level as much as possible.  Eat foods high in protein and vitamin C to promote healing.     [facility] followup visit ________1 week_____________________  (Please note your next appointment above and if you are unable to keep, kindly give a 24 hour notice. Thank you.)     Physician signature:__________________________        If you experience any of the following, please call the Wound Care Center during business hours:     * Increase in Pain  * Temperature over 101  * Increase in drainage from your wound  * Drainage with a foul odor  * Bleeding  * Increase in swelling  * Need for compression bandage changes due to slippage, breakthrough drainage.

## 2024-03-05 ENCOUNTER — HOSPITAL ENCOUNTER (OUTPATIENT)
Dept: WOUND CARE | Age: 89
Discharge: HOME | End: 2024-03-05
Attending: PODIATRIST

## 2024-03-18 NOTE — DISCHARGE INSTRUCTIONS
Visit Discharge/Physician Orders     Discharge condition: Stable     Assessment of pain at discharge: moderate     Anesthetic used: 4% lidocaine     Discharge to: ECF     Left via:Private automobile     Accompanied by: family     ECF/HHA:  [place] -      Dressing Orders: Right leg: cleanse wound with normal saline, apply adaptic followed by plain alginate. Cover with ABD's, secure with kerlix and tape. Change daily.  In clinic - wrap leg from base of toes to back of knee with kerlix and coban     Treatment Orders:  Elevate legs above heart level as much as possible.  Eat foods high in protein and vitamin C to promote healing.     [facility] followup visit ________1 week_____________________  (Please note your next appointment above and if you are unable to keep, kindly give a 24 hour notice. Thank you.)     Physician signature:__________________________        If you experience any of the following, please call the Wound Care Center during business hours:     * Increase in Pain  * Temperature over 101  * Increase in drainage from your wound  * Drainage with a foul odor  * Bleeding  * Increase in swelling  * Need for compression bandage changes due to slippage, breakthrough drainage.

## 2024-03-19 ENCOUNTER — HOSPITAL ENCOUNTER (OUTPATIENT)
Dept: WOUND CARE | Age: 89
Discharge: HOME | End: 2024-03-19
Attending: PODIATRIST

## 2024-05-08 ENCOUNTER — APPOINTMENT (OUTPATIENT)
Dept: GENERAL RADIOLOGY | Age: 89
DRG: 309 | End: 2024-05-08
Payer: MEDICARE

## 2024-05-08 ENCOUNTER — APPOINTMENT (OUTPATIENT)
Dept: CT IMAGING | Age: 89
DRG: 309 | End: 2024-05-08
Payer: MEDICARE

## 2024-05-08 ENCOUNTER — HOSPITAL ENCOUNTER (INPATIENT)
Age: 89
LOS: 8 days | Discharge: NURSING, REHAB OR LONG-TERM CARE | DRG: 309 | End: 2024-05-16
Attending: EMERGENCY MEDICINE | Admitting: INTERNAL MEDICINE
Payer: MEDICARE

## 2024-05-08 DIAGNOSIS — I50.32 CHRONIC DIASTOLIC CONGESTIVE HEART FAILURE (HCC): ICD-10-CM

## 2024-05-08 DIAGNOSIS — R00.1 SYMPTOMATIC BRADYCARDIA: Primary | ICD-10-CM

## 2024-05-08 LAB
ALBUMIN SERPL-MCNC: 3.3 G/DL (ref 3.5–5.2)
ALP SERPL-CCNC: 96 U/L (ref 35–104)
ALT SERPL-CCNC: 14 U/L (ref 0–32)
ANION GAP SERPL CALCULATED.3IONS-SCNC: 13 MMOL/L (ref 7–16)
AST SERPL-CCNC: 23 U/L (ref 0–31)
BASOPHILS # BLD: 0.03 K/UL (ref 0–0.2)
BASOPHILS NFR BLD: 1 % (ref 0–2)
BILIRUB SERPL-MCNC: 0.5 MG/DL (ref 0–1.2)
BILIRUB UR QL STRIP: NEGATIVE
BNP SERPL-MCNC: 6121 PG/ML (ref 0–450)
BUN SERPL-MCNC: 60 MG/DL (ref 6–23)
CALCIUM SERPL-MCNC: 8.9 MG/DL (ref 8.6–10.2)
CHLORIDE SERPL-SCNC: 96 MMOL/L (ref 98–107)
CLARITY UR: CLEAR
CO2 SERPL-SCNC: 24 MMOL/L (ref 22–29)
COLOR UR: YELLOW
CREAT SERPL-MCNC: 2.8 MG/DL (ref 0.5–1)
EOSINOPHIL # BLD: 0.03 K/UL (ref 0.05–0.5)
EOSINOPHILS RELATIVE PERCENT: 1 % (ref 0–6)
EPI CELLS #/AREA URNS HPF: NORMAL /HPF
ERYTHROCYTE [DISTWIDTH] IN BLOOD BY AUTOMATED COUNT: 15.2 % (ref 11.5–15)
GFR, ESTIMATED: 15 ML/MIN/1.73M2
GLUCOSE BLD-MCNC: 144 MG/DL (ref 74–99)
GLUCOSE SERPL-MCNC: 114 MG/DL (ref 74–99)
GLUCOSE UR STRIP-MCNC: NEGATIVE MG/DL
HCT VFR BLD AUTO: 31.4 % (ref 34–48)
HGB BLD-MCNC: 10.2 G/DL (ref 11.5–15.5)
HGB UR QL STRIP.AUTO: NEGATIVE
IMM GRANULOCYTES # BLD AUTO: <0.03 K/UL (ref 0–0.58)
IMM GRANULOCYTES NFR BLD: 0 % (ref 0–5)
KETONES UR STRIP-MCNC: NEGATIVE MG/DL
LEUKOCYTE ESTERASE UR QL STRIP: NEGATIVE
LYMPHOCYTES NFR BLD: 1 K/UL (ref 1.5–4)
LYMPHOCYTES RELATIVE PERCENT: 21 % (ref 20–42)
MAGNESIUM SERPL-MCNC: 2.5 MG/DL (ref 1.6–2.6)
MCH RBC QN AUTO: 32.1 PG (ref 26–35)
MCHC RBC AUTO-ENTMCNC: 32.5 G/DL (ref 32–34.5)
MCV RBC AUTO: 98.7 FL (ref 80–99.9)
MONOCYTES NFR BLD: 0.68 K/UL (ref 0.1–0.95)
MONOCYTES NFR BLD: 14 % (ref 2–12)
NEUTROPHILS NFR BLD: 63 % (ref 43–80)
NEUTS SEG NFR BLD: 3.04 K/UL (ref 1.8–7.3)
NITRITE UR QL STRIP: NEGATIVE
PH UR STRIP: 6.5 [PH] (ref 5–9)
PLATELET # BLD AUTO: 178 K/UL (ref 130–450)
PMV BLD AUTO: 11.3 FL (ref 7–12)
POTASSIUM SERPL-SCNC: 5 MMOL/L (ref 3.5–5)
PROT SERPL-MCNC: 5.6 G/DL (ref 6.4–8.3)
PROT UR STRIP-MCNC: NEGATIVE MG/DL
RBC # BLD AUTO: 3.18 M/UL (ref 3.5–5.5)
RBC #/AREA URNS HPF: NORMAL /HPF
SODIUM SERPL-SCNC: 133 MMOL/L (ref 132–146)
SP GR UR STRIP: 1.01 (ref 1–1.03)
TROPONIN I SERPL HS-MCNC: 68 NG/L (ref 0–9)
TROPONIN I SERPL HS-MCNC: 72 NG/L (ref 0–9)
UROBILINOGEN UR STRIP-ACNC: 0.2 EU/DL (ref 0–1)
WBC #/AREA URNS HPF: NORMAL /HPF
WBC OTHER # BLD: 4.8 K/UL (ref 4.5–11.5)

## 2024-05-08 PROCEDURE — 70450 CT HEAD/BRAIN W/O DYE: CPT

## 2024-05-08 PROCEDURE — 2580000003 HC RX 258

## 2024-05-08 PROCEDURE — 99223 1ST HOSP IP/OBS HIGH 75: CPT | Performed by: INTERNAL MEDICINE

## 2024-05-08 PROCEDURE — 71045 X-RAY EXAM CHEST 1 VIEW: CPT

## 2024-05-08 PROCEDURE — 6360000002 HC RX W HCPCS: Performed by: INTERNAL MEDICINE

## 2024-05-08 PROCEDURE — 96374 THER/PROPH/DIAG INJ IV PUSH: CPT

## 2024-05-08 PROCEDURE — 96366 THER/PROPH/DIAG IV INF ADDON: CPT

## 2024-05-08 PROCEDURE — 99285 EMERGENCY DEPT VISIT HI MDM: CPT

## 2024-05-08 PROCEDURE — 83880 ASSAY OF NATRIURETIC PEPTIDE: CPT

## 2024-05-08 PROCEDURE — 84484 ASSAY OF TROPONIN QUANT: CPT

## 2024-05-08 PROCEDURE — 2000000000 HC ICU R&B

## 2024-05-08 PROCEDURE — 6360000002 HC RX W HCPCS

## 2024-05-08 PROCEDURE — 81001 URINALYSIS AUTO W/SCOPE: CPT

## 2024-05-08 PROCEDURE — 83735 ASSAY OF MAGNESIUM: CPT

## 2024-05-08 PROCEDURE — 85025 COMPLETE CBC W/AUTO DIFF WBC: CPT

## 2024-05-08 PROCEDURE — 96365 THER/PROPH/DIAG IV INF INIT: CPT

## 2024-05-08 PROCEDURE — 93005 ELECTROCARDIOGRAM TRACING: CPT

## 2024-05-08 PROCEDURE — 82962 GLUCOSE BLOOD TEST: CPT

## 2024-05-08 PROCEDURE — 6370000000 HC RX 637 (ALT 250 FOR IP): Performed by: INTERNAL MEDICINE

## 2024-05-08 PROCEDURE — 80053 COMPREHEN METABOLIC PANEL: CPT

## 2024-05-08 RX ORDER — ATROPINE SULFATE 0.1 MG/ML
1 INJECTION INTRAVENOUS ONCE
Status: COMPLETED | OUTPATIENT
Start: 2024-05-08 | End: 2024-05-08

## 2024-05-08 RX ORDER — POLYETHYLENE GLYCOL 3350 17 G/17G
17 POWDER, FOR SOLUTION ORAL DAILY PRN
Status: DISCONTINUED | OUTPATIENT
Start: 2024-05-08 | End: 2024-05-16 | Stop reason: HOSPADM

## 2024-05-08 RX ORDER — ONDANSETRON 4 MG/1
4 TABLET, ORALLY DISINTEGRATING ORAL EVERY 8 HOURS PRN
Status: DISCONTINUED | OUTPATIENT
Start: 2024-05-08 | End: 2024-05-16 | Stop reason: HOSPADM

## 2024-05-08 RX ORDER — 0.9 % SODIUM CHLORIDE 0.9 %
1000 INTRAVENOUS SOLUTION INTRAVENOUS ONCE
Status: COMPLETED | OUTPATIENT
Start: 2024-05-08 | End: 2024-05-08

## 2024-05-08 RX ORDER — GABAPENTIN 400 MG/1
400 CAPSULE ORAL NIGHTLY
Status: DISCONTINUED | OUTPATIENT
Start: 2024-05-09 | End: 2024-05-13

## 2024-05-08 RX ORDER — ACETAMINOPHEN 650 MG/1
650 SUPPOSITORY RECTAL EVERY 6 HOURS PRN
Status: DISCONTINUED | OUTPATIENT
Start: 2024-05-08 | End: 2024-05-16 | Stop reason: HOSPADM

## 2024-05-08 RX ORDER — ONDANSETRON 2 MG/ML
4 INJECTION INTRAMUSCULAR; INTRAVENOUS EVERY 6 HOURS PRN
Status: DISCONTINUED | OUTPATIENT
Start: 2024-05-08 | End: 2024-05-16 | Stop reason: HOSPADM

## 2024-05-08 RX ORDER — DOPAMINE HYDROCHLORIDE 320 MG/100ML
1-20 INJECTION, SOLUTION INTRAVENOUS CONTINUOUS
Status: DISCONTINUED | OUTPATIENT
Start: 2024-05-08 | End: 2024-05-09

## 2024-05-08 RX ORDER — SODIUM CHLORIDE 9 MG/ML
INJECTION, SOLUTION INTRAVENOUS PRN
Status: DISCONTINUED | OUTPATIENT
Start: 2024-05-08 | End: 2024-05-16 | Stop reason: HOSPADM

## 2024-05-08 RX ORDER — METOPROLOL SUCCINATE 25 MG/1
25 TABLET, EXTENDED RELEASE ORAL DAILY
Status: ON HOLD | COMMUNITY
End: 2024-05-16 | Stop reason: HOSPADM

## 2024-05-08 RX ORDER — BUMETANIDE 1 MG/1
1 TABLET ORAL DAILY
Status: DISCONTINUED | OUTPATIENT
Start: 2024-05-09 | End: 2024-05-16

## 2024-05-08 RX ORDER — AMIODARONE HYDROCHLORIDE 100 MG/1
100 TABLET ORAL DAILY
Status: ON HOLD | COMMUNITY
Start: 2024-04-04 | End: 2024-05-16 | Stop reason: HOSPADM

## 2024-05-08 RX ORDER — TORSEMIDE 10 MG/1
10 TABLET ORAL DAILY
Status: ON HOLD | COMMUNITY
End: 2024-05-16 | Stop reason: HOSPADM

## 2024-05-08 RX ORDER — DOXYCYCLINE HYCLATE 100 MG/1
100 CAPSULE ORAL 2 TIMES DAILY
Status: DISCONTINUED | OUTPATIENT
Start: 2024-05-09 | End: 2024-05-11

## 2024-05-08 RX ORDER — HEPARIN SODIUM 5000 [USP'U]/ML
5000 INJECTION, SOLUTION INTRAVENOUS; SUBCUTANEOUS EVERY 8 HOURS SCHEDULED
Status: DISCONTINUED | OUTPATIENT
Start: 2024-05-09 | End: 2024-05-10

## 2024-05-08 RX ORDER — SODIUM CHLORIDE 0.9 % (FLUSH) 0.9 %
5-40 SYRINGE (ML) INJECTION EVERY 12 HOURS SCHEDULED
Status: DISCONTINUED | OUTPATIENT
Start: 2024-05-09 | End: 2024-05-16 | Stop reason: HOSPADM

## 2024-05-08 RX ORDER — SODIUM CHLORIDE 0.9 % (FLUSH) 0.9 %
5-40 SYRINGE (ML) INJECTION PRN
Status: DISCONTINUED | OUTPATIENT
Start: 2024-05-08 | End: 2024-05-16 | Stop reason: HOSPADM

## 2024-05-08 RX ORDER — ACETAMINOPHEN 325 MG/1
650 TABLET ORAL EVERY 6 HOURS PRN
Status: DISCONTINUED | OUTPATIENT
Start: 2024-05-08 | End: 2024-05-16 | Stop reason: HOSPADM

## 2024-05-08 RX ADMIN — ONDANSETRON 4 MG: 2 INJECTION INTRAMUSCULAR; INTRAVENOUS at 22:28

## 2024-05-08 RX ADMIN — SODIUM CHLORIDE 1000 ML: 9 INJECTION, SOLUTION INTRAVENOUS at 15:23

## 2024-05-08 RX ADMIN — DOPAMINE HYDROCHLORIDE 5 MCG/KG/MIN: 320 INJECTION, SOLUTION INTRAVENOUS at 15:50

## 2024-05-08 RX ADMIN — ACETAMINOPHEN 650 MG: 325 TABLET ORAL at 22:28

## 2024-05-08 RX ADMIN — ATROPINE SULFATE 1 MG: 0.1 INJECTION, SOLUTION INTRAVENOUS at 15:13

## 2024-05-08 ASSESSMENT — PAIN DESCRIPTION - LOCATION: LOCATION: BACK

## 2024-05-08 ASSESSMENT — PAIN DESCRIPTION - DESCRIPTORS: DESCRIPTORS: ACHING

## 2024-05-08 ASSESSMENT — PAIN SCALES - GENERAL: PAINLEVEL_OUTOF10: 5

## 2024-05-08 NOTE — ED PROVIDER NOTES
[facility] EMERGENCY DEPARTMENT  EMERGENCY DEPARTMENT ENCOUNTER      Pt Name: Mary Khan  MRN: [id number]  Birthdate 9/3/1928  Date of evaluation: 5/8/2024  Provider: Alvaro Valdovinos MD  PCP: Sandeep Ewing MD  Note Started: 3:13 PM EDT 5/8/24    CHIEF COMPLAINT       Chief Complaint   Patient presents with    Dizziness     Sent in by home health nurse for hypotension and dizziness-pt is axox3 with no current complaints       HISTORY OF PRESENT ILLNESS: 1 or more Elements   History From: Patient, patient's son  Limitations to history : None    Mary Khan is a 89 y.o. female who presents 89 y.o. female who presents with reports of low heart rate on at home evaluation with home nursing.  Patient reports she was recently discharged from rehab facility back to home.  She is probably alert and oriented x 4.  She reports that on evaluation today with her home nurse, was noted to have decreased heart rate.  Per patient and patient's son who presents during this interview, she reportedly did not receive Entresto while in rehab facility, she reports she may have received another medication.  She does report some lightheadedness earlier today however denies room spinning sensation or loss of position sense.  Currently denies any acute nausea, vomiting, fever, chills, chest pain, shortness of breath, weakness, numbness, tingling in extremities, dysuria, diarrhea, constipation.    Nursing Notes were all reviewed and agreed with or any disagreements were addressed in the HPI.    REVIEW OF SYSTEMS :    Positives and Pertinent negatives as per HPI.     PAST MEDICAL HISTORY/Chronic Conditions Affecting Care    has a past medical history of Breast cancer (HCC), Cancer (HCC), Chronic back pain, CKD (chronic kidney disease) stage 3, GFR 30-59 ml/min (HCC), Hyperlipidemia, Hypertension, Macular degeneration, Osteoarthritis, and Restless legs syndrome.     SURGICAL HISTORY     Past  cardioversion, resuscitative medications, defibrillation, compressions.    [facility]    I discussed the results of the workup with the patient as well as the recommendation for admission.  Patient verbalizes their understanding and is agreeable and amenable to the plan at this time.  All questions answered, through shared decision making we will plan for admission.    Disposition Considerations (Tests not ordered but considered, Shared Decision Making, Pt Expectation of Test or Tx.): Admit    FINAL IMPRESSION      1. Symptomatic bradycardia          DISPOSITION/PLAN     DISPOSITION Admitted 05/08/2024 06:24:08 PM    PATIENT REFERRED TO:  No follow-up provider specified.    DISCHARGE MEDICATIONS:  New Prescriptions    No medications on file       DISCONTINUED MEDICATIONS:  Discontinued Medications    METOPROLOL SUCCINATE (TOPROL XL) 25 MG EXTENDED RELEASE TABLET    Take 1 tablet by mouth daily            (Please note that portions of this note were completed with a voice recognition program.  Efforts were made to edit the dictations but occasionally words are mis-transcribed.)    Alvaro Valdovinos MD (electronically signed)

## 2024-05-08 NOTE — H&P
History & Physicial  Mary Khan  [id number]  9/3/1928  05/08/24  Primary Care:  Sandeep Ewing MD  [address] [facility] [id number]        Chief Complaint   Patient presents with    Dizziness     Sent in by home health nurse for hypotension and dizziness-pt is axox3 with no current complaints       HPI:    89-year-old female presents to the emergency department after visiting nurse noticed a very slow heart rate.  The nurses in the house to do a wound care check and dressing change.  Patient had just been discharged from the skilled nursing facility.  It appears that she has been on amiodarone and beta-blockade.  Both medications have been stopped.  She was placed on dopamine and is responded with heart rate in this 60s.  It is in the low 60s.  I personally reviewed the twelve-lead EKG and can confirm that the heart rate was in the low 30s.      Prior to Visit Medications    Medication Sig Taking? Authorizing Provider   doxycycline hyclate (VIBRAMYCIN) 100 MG capsule Take 1 capsule by mouth 2 times daily  Brittanie Doe MD   polyethylene glycol (MIRALAX) 17 g PACK packet Take 17 g by mouth daily  Brittanie Doe MD   lidocaine (HM LIDOCAINE PATCH) 4 % external patch Place 1 patch onto the skin daily  Brittanie Doe MD   apixaban (ELIQUIS) 2.5 MG TABS tablet Take 1 tablet by mouth 2 times daily  Brittanie Doe MD   sacubitril-valsartan (ENTRESTO) 24-26 MG per tablet Take 1 tablet by mouth 2 times daily  Brittanie Doe MD   bumetanide (BUMEX) 2 MG tablet Take 0.5 tablets by mouth daily  Sandeep Ewing MD   gabapentin (NEURONTIN) 400 MG capsule Take 1 capsule by mouth nightly as needed.  Brittanie Doe MD   Cholecalciferol (VITAMIN D) 2000 UNITS CAPS capsule Take by mouth daily  Brittanie Doe MD   acetaminophen (TYLENOL) 325 MG tablet Take 2 tablets by mouth every 6 hours as needed for Pain  Brittanie Doe MD     Social History  abdominal tenderness. There is no guarding.   Musculoskeletal:      Right lower leg: Edema present.      Left lower leg: Edema present.   Skin:     Coloration: Skin is pale.   Neurological:      General: No focal deficit present.      Mental Status: She is alert and oriented to person, place, and time. Mental status is at baseline.      Cranial Nerves: No cranial nerve deficit.      Motor: No weakness.   Psychiatric:         Mood and Affect: Mood normal.         Behavior: Behavior normal.         Thought Content: Thought content normal.         Judgment: Judgment normal.         Principal Problem:    Bradycardia  Active Problems:    Bradycardia with 31-40 beats per minute  Resolved Problems:    * No resolved hospital problems. *    Plan:  Admit to ICU because of dopamine  Discontinue beta-blockade discontinue amiodarone  Eliquis on hold in case of pacemaker needed  I spoke with intensive care/Dr. French for acceptance  Consultation with cardiology.  I talked with cardiology.  Wound care right lower extremity  Any med that can cause hypotension has been held including gabapentin  DVT Prophylaxis: Currently on Eliquis being held.  She will receive subcutaneous heparin  Code Status: Patient states that she has a living will and she is a DNR.  For purposes of treatment today she is a DNR CCA    Electronically signed by Fredrick Sevilla MD on 5/8/2024 at 6:25 PM

## 2024-05-09 ENCOUNTER — APPOINTMENT (OUTPATIENT)
Age: 89
DRG: 309 | End: 2024-05-09
Payer: MEDICARE

## 2024-05-09 LAB
ALBUMIN SERPL-MCNC: 3.3 G/DL (ref 3.5–5.2)
ALP SERPL-CCNC: 104 U/L (ref 35–104)
ALT SERPL-CCNC: 15 U/L (ref 0–32)
ANION GAP SERPL CALCULATED.3IONS-SCNC: 12 MMOL/L (ref 7–16)
AST SERPL-CCNC: 27 U/L (ref 0–31)
BASOPHILS # BLD: 0.04 K/UL (ref 0–0.2)
BASOPHILS NFR BLD: 0 % (ref 0–2)
BILIRUB SERPL-MCNC: 0.4 MG/DL (ref 0–1.2)
BUN SERPL-MCNC: 57 MG/DL (ref 6–23)
CA-I BLD-SCNC: 1.16 MMOL/L (ref 1.15–1.33)
CALCIUM SERPL-MCNC: 8.9 MG/DL (ref 8.6–10.2)
CHLORIDE SERPL-SCNC: 98 MMOL/L (ref 98–107)
CO2 SERPL-SCNC: 24 MMOL/L (ref 22–29)
CREAT SERPL-MCNC: 1.8 MG/DL (ref 0.5–1)
ECHO AV AREA PEAK VELOCITY: 2.1 CM2
ECHO AV AREA VTI: 2.3 CM2
ECHO AV AREA/BSA PEAK VELOCITY: 1.3 CM2/M2
ECHO AV AREA/BSA VTI: 1.4 CM2/M2
ECHO AV CUSP MM: 1.4 CM
ECHO AV MEAN GRADIENT: 7 MMHG
ECHO AV MEAN VELOCITY: 1.2 M/S
ECHO AV PEAK GRADIENT: 14 MMHG
ECHO AV PEAK VELOCITY: 1.9 M/S
ECHO AV VELOCITY RATIO: 0.89
ECHO AV VTI: 34.8 CM
ECHO BSA: 1.63 M2
ECHO LA DIAMETER INDEX: 2.55 CM/M2
ECHO LA DIAMETER: 4.1 CM
ECHO LA VOL A-L A2C: 53 ML (ref 22–52)
ECHO LA VOL A-L A4C: 76 ML (ref 22–52)
ECHO LA VOL BP: 62 ML (ref 22–52)
ECHO LA VOL MOD A2C: 53 ML (ref 22–52)
ECHO LA VOL MOD A4C: 72 ML (ref 22–52)
ECHO LA VOL/BSA BIPLANE: 39 ML/M2 (ref 16–34)
ECHO LA VOLUME AREA LENGTH: 65 ML
ECHO LA VOLUME INDEX A-L A2C: 33 ML/M2 (ref 16–34)
ECHO LA VOLUME INDEX A-L A4C: 47 ML/M2 (ref 16–34)
ECHO LA VOLUME INDEX AREA LENGTH: 40 ML/M2 (ref 16–34)
ECHO LA VOLUME INDEX MOD A2C: 33 ML/M2 (ref 16–34)
ECHO LA VOLUME INDEX MOD A4C: 45 ML/M2 (ref 16–34)
ECHO LV FRACTIONAL SHORTENING: 41 % (ref 28–44)
ECHO LV INTERNAL DIMENSION DIASTOLE INDEX: 2.55 CM/M2
ECHO LV INTERNAL DIMENSION DIASTOLIC: 4.1 CM (ref 3.9–5.3)
ECHO LV INTERNAL DIMENSION SYSTOLIC INDEX: 1.49 CM/M2
ECHO LV INTERNAL DIMENSION SYSTOLIC: 2.4 CM
ECHO LV IVSD: 0.8 CM (ref 0.6–0.9)
ECHO LV IVSS: 1.3 CM
ECHO LV MASS 2D: 105.6 G (ref 67–162)
ECHO LV MASS INDEX 2D: 65.6 G/M2 (ref 43–95)
ECHO LV POSTERIOR WALL DIASTOLIC: 0.9 CM (ref 0.6–0.9)
ECHO LV POSTERIOR WALL SYSTOLIC: 1.3 CM
ECHO LV RELATIVE WALL THICKNESS RATIO: 0.44
ECHO LVOT AREA: 2.3 CM2
ECHO LVOT AV VTI INDEX: 1.03
ECHO LVOT DIAM: 1.7 CM
ECHO LVOT MEAN GRADIENT: 5 MMHG
ECHO LVOT PEAK GRADIENT: 12 MMHG
ECHO LVOT PEAK VELOCITY: 1.7 M/S
ECHO LVOT STROKE VOLUME INDEX: 50.3 ML/M2
ECHO LVOT SV: 81 ML
ECHO LVOT VTI: 35.7 CM
ECHO MV A VELOCITY: 0.39 M/S
ECHO MV AREA PHT: 4.8 CM2
ECHO MV AREA VTI: 1.7 CM2
ECHO MV E DECELERATION TIME (DT): 174.2 MS
ECHO MV E VELOCITY: 1.45 M/S
ECHO MV E/A RATIO: 3.72
ECHO MV LVOT VTI INDEX: 1.32
ECHO MV MAX VELOCITY: 1.7 M/S
ECHO MV MEAN GRADIENT: 2 MMHG
ECHO MV MEAN VELOCITY: 0.5 M/S
ECHO MV PEAK GRADIENT: 12 MMHG
ECHO MV PRESSURE HALF TIME (PHT): 45.8 MS
ECHO MV VTI: 47 CM
ECHO PV MAX VELOCITY: 1.6 M/S
ECHO PV MEAN GRADIENT: 4 MMHG
ECHO PV MEAN VELOCITY: 0.9 M/S
ECHO PV PEAK GRADIENT: 10 MMHG
ECHO PV VTI: 23.9 CM
ECHO RV INTERNAL DIMENSION: 3.3 CM
ECHO RV LONGITUDINAL DIMENSION: 6.4 CM
ECHO RV MID DIMENSION: 1.8 CM
ECHO TV REGURGITANT MAX VELOCITY: 3.31 M/S
ECHO TV REGURGITANT PEAK GRADIENT: 44 MMHG
EKG ATRIAL RATE: 31 BPM
EKG ATRIAL RATE: 64 BPM
EKG Q-T INTERVAL: 436 MS
EKG Q-T INTERVAL: 588 MS
EKG QRS DURATION: 76 MS
EKG QRS DURATION: 80 MS
EKG QTC CALCULATION (BAZETT): 446 MS
EKG QTC CALCULATION (BAZETT): 454 MS
EKG R AXIS: -15 DEGREES
EKG R AXIS: -2 DEGREES
EKG T AXIS: 27 DEGREES
EKG T AXIS: 61 DEGREES
EKG VENTRICULAR RATE: 36 BPM
EKG VENTRICULAR RATE: 63 BPM
EOSINOPHIL # BLD: 0 K/UL (ref 0.05–0.5)
EOSINOPHILS RELATIVE PERCENT: 0 % (ref 0–6)
ERYTHROCYTE [DISTWIDTH] IN BLOOD BY AUTOMATED COUNT: 14.7 % (ref 11.5–15)
GFR, ESTIMATED: 25 ML/MIN/1.73M2
GLUCOSE SERPL-MCNC: 147 MG/DL (ref 74–99)
HCT VFR BLD AUTO: 34.2 % (ref 34–48)
HGB BLD-MCNC: 11.3 G/DL (ref 11.5–15.5)
IMM GRANULOCYTES # BLD AUTO: 0.04 K/UL (ref 0–0.58)
IMM GRANULOCYTES NFR BLD: 0 % (ref 0–5)
LYMPHOCYTES NFR BLD: 0.71 K/UL (ref 1.5–4)
LYMPHOCYTES RELATIVE PERCENT: 6 % (ref 20–42)
MAGNESIUM SERPL-MCNC: 2.5 MG/DL (ref 1.6–2.6)
MCH RBC QN AUTO: 31.8 PG (ref 26–35)
MCHC RBC AUTO-ENTMCNC: 33 G/DL (ref 32–34.5)
MCV RBC AUTO: 96.3 FL (ref 80–99.9)
MONOCYTES NFR BLD: 1.43 K/UL (ref 0.1–0.95)
MONOCYTES NFR BLD: 13 % (ref 2–12)
NEUTROPHILS NFR BLD: 81 % (ref 43–80)
NEUTS SEG NFR BLD: 9.25 K/UL (ref 1.8–7.3)
PHOSPHATE SERPL-MCNC: 3.9 MG/DL (ref 2.5–4.5)
PLATELET # BLD AUTO: 219 K/UL (ref 130–450)
PMV BLD AUTO: 11.2 FL (ref 7–12)
POTASSIUM SERPL-SCNC: 5 MMOL/L (ref 3.5–5)
PROT SERPL-MCNC: 6.3 G/DL (ref 6.4–8.3)
RBC # BLD AUTO: 3.55 M/UL (ref 3.5–5.5)
SODIUM SERPL-SCNC: 134 MMOL/L (ref 132–146)
T4 FREE SERPL-MCNC: 1.5 NG/DL (ref 0.9–1.7)
TSH SERPL DL<=0.05 MIU/L-ACNC: 0.71 UIU/ML (ref 0.27–4.2)
WBC OTHER # BLD: 11.5 K/UL (ref 4.5–11.5)

## 2024-05-09 PROCEDURE — 99232 SBSQ HOSP IP/OBS MODERATE 35: CPT | Performed by: INTERNAL MEDICINE

## 2024-05-09 PROCEDURE — 84439 ASSAY OF FREE THYROXINE: CPT

## 2024-05-09 PROCEDURE — 2580000003 HC RX 258

## 2024-05-09 PROCEDURE — 84100 ASSAY OF PHOSPHORUS: CPT

## 2024-05-09 PROCEDURE — A4216 STERILE WATER/SALINE, 10 ML: HCPCS

## 2024-05-09 PROCEDURE — 93308 TTE F-UP OR LMTD: CPT

## 2024-05-09 PROCEDURE — 97530 THERAPEUTIC ACTIVITIES: CPT

## 2024-05-09 PROCEDURE — 87070 CULTURE OTHR SPECIMN AEROBIC: CPT

## 2024-05-09 PROCEDURE — 2000000000 HC ICU R&B

## 2024-05-09 PROCEDURE — C9113 INJ PANTOPRAZOLE SODIUM, VIA: HCPCS

## 2024-05-09 PROCEDURE — 85025 COMPLETE CBC W/AUTO DIFF WBC: CPT

## 2024-05-09 PROCEDURE — 36415 COLL VENOUS BLD VENIPUNCTURE: CPT

## 2024-05-09 PROCEDURE — 93010 ELECTROCARDIOGRAM REPORT: CPT | Performed by: INTERNAL MEDICINE

## 2024-05-09 PROCEDURE — 82330 ASSAY OF CALCIUM: CPT

## 2024-05-09 PROCEDURE — 93005 ELECTROCARDIOGRAM TRACING: CPT | Performed by: INTERNAL MEDICINE

## 2024-05-09 PROCEDURE — 6360000002 HC RX W HCPCS: Performed by: INTERNAL MEDICINE

## 2024-05-09 PROCEDURE — 86403 PARTICLE AGGLUT ANTBDY SCRN: CPT

## 2024-05-09 PROCEDURE — 87081 CULTURE SCREEN ONLY: CPT

## 2024-05-09 PROCEDURE — 6370000000 HC RX 637 (ALT 250 FOR IP): Performed by: INTERNAL MEDICINE

## 2024-05-09 PROCEDURE — 6370000000 HC RX 637 (ALT 250 FOR IP)

## 2024-05-09 PROCEDURE — 84443 ASSAY THYROID STIM HORMONE: CPT

## 2024-05-09 PROCEDURE — 99291 CRITICAL CARE FIRST HOUR: CPT | Performed by: INTERNAL MEDICINE

## 2024-05-09 PROCEDURE — 2580000003 HC RX 258: Performed by: INTERNAL MEDICINE

## 2024-05-09 PROCEDURE — 87205 SMEAR GRAM STAIN: CPT

## 2024-05-09 PROCEDURE — 6360000002 HC RX W HCPCS

## 2024-05-09 PROCEDURE — 97161 PT EVAL LOW COMPLEX 20 MIN: CPT

## 2024-05-09 PROCEDURE — 80053 COMPREHEN METABOLIC PANEL: CPT

## 2024-05-09 PROCEDURE — 83735 ASSAY OF MAGNESIUM: CPT

## 2024-05-09 RX ORDER — DOPAMINE HYDROCHLORIDE 320 MG/100ML
1-20 INJECTION, SOLUTION INTRAVENOUS CONTINUOUS
Status: DISCONTINUED | OUTPATIENT
Start: 2024-05-09 | End: 2024-05-11

## 2024-05-09 RX ORDER — TOBRAMYCIN 3 MG/ML
1 SOLUTION/ DROPS OPHTHALMIC
Status: DISCONTINUED | OUTPATIENT
Start: 2024-05-09 | End: 2024-05-10

## 2024-05-09 RX ADMIN — DOXYCYCLINE HYCLATE 100 MG: 100 CAPSULE ORAL at 20:25

## 2024-05-09 RX ADMIN — DOXYCYCLINE HYCLATE 100 MG: 100 CAPSULE ORAL at 09:14

## 2024-05-09 RX ADMIN — TOBRAMYCIN OPHTHALMIC SOLUTION 1 DROP: 3 SOLUTION/ DROPS OPHTHALMIC at 17:15

## 2024-05-09 RX ADMIN — TOBRAMYCIN OPHTHALMIC SOLUTION 1 DROP: 3 SOLUTION/ DROPS OPHTHALMIC at 04:16

## 2024-05-09 RX ADMIN — SODIUM CHLORIDE 40 MG: 9 INJECTION, SOLUTION INTRAMUSCULAR; INTRAVENOUS; SUBCUTANEOUS at 09:14

## 2024-05-09 RX ADMIN — HEPARIN SODIUM 5000 UNITS: 5000 INJECTION INTRAVENOUS; SUBCUTANEOUS at 15:01

## 2024-05-09 RX ADMIN — HEPARIN SODIUM 5000 UNITS: 5000 INJECTION INTRAVENOUS; SUBCUTANEOUS at 06:47

## 2024-05-09 RX ADMIN — Medication 10 ML: at 09:24

## 2024-05-09 RX ADMIN — DOXYCYCLINE HYCLATE 100 MG: 100 CAPSULE ORAL at 00:29

## 2024-05-09 RX ADMIN — ACETAMINOPHEN 650 MG: 325 TABLET ORAL at 14:26

## 2024-05-09 RX ADMIN — TOBRAMYCIN OPHTHALMIC SOLUTION 1 DROP: 3 SOLUTION/ DROPS OPHTHALMIC at 12:08

## 2024-05-09 RX ADMIN — DOPAMINE HYDROCHLORIDE 15 MCG/KG/MIN: 320 INJECTION, SOLUTION INTRAVENOUS at 11:23

## 2024-05-09 RX ADMIN — ACETAMINOPHEN 650 MG: 325 TABLET ORAL at 20:30

## 2024-05-09 RX ADMIN — Medication 10 ML: at 20:26

## 2024-05-09 RX ADMIN — HEPARIN SODIUM 5000 UNITS: 5000 INJECTION INTRAVENOUS; SUBCUTANEOUS at 00:30

## 2024-05-09 RX ADMIN — TOBRAMYCIN OPHTHALMIC SOLUTION 1 DROP: 3 SOLUTION/ DROPS OPHTHALMIC at 09:23

## 2024-05-09 RX ADMIN — TOBRAMYCIN OPHTHALMIC SOLUTION 1 DROP: 3 SOLUTION/ DROPS OPHTHALMIC at 20:25

## 2024-05-09 RX ADMIN — HEPARIN SODIUM 5000 UNITS: 5000 INJECTION INTRAVENOUS; SUBCUTANEOUS at 21:59

## 2024-05-09 ASSESSMENT — PAIN SCALES - GENERAL
PAINLEVEL_OUTOF10: 3
PAINLEVEL_OUTOF10: 3
PAINLEVEL_OUTOF10: 0
PAINLEVEL_OUTOF10: 5
PAINLEVEL_OUTOF10: 3
PAINLEVEL_OUTOF10: 3
PAINLEVEL_OUTOF10: 0

## 2024-05-09 ASSESSMENT — PAIN DESCRIPTION - LOCATION
LOCATION: BACK

## 2024-05-09 ASSESSMENT — PAIN - FUNCTIONAL ASSESSMENT: PAIN_FUNCTIONAL_ASSESSMENT: ACTIVITIES ARE NOT PREVENTED

## 2024-05-09 ASSESSMENT — PAIN DESCRIPTION - ORIENTATION: ORIENTATION: LOWER

## 2024-05-09 ASSESSMENT — PAIN DESCRIPTION - DESCRIPTORS: DESCRIPTORS: ACHING

## 2024-05-09 NOTE — PROGRESS NOTES
Progress  Note  Chief Complaint   Patient presents with    Dizziness     Sent in by home health nurse for hypotension and dizziness-pt is axox3 with no current complaints     Historical Issues:  Current Facility-Administered Medications   Medication Dose Route Frequency Provider Last Rate Last Admin    pantoprazole (PROTONIX) 40 mg in sodium chloride (PF) 0.9 % 10 mL injection  40 mg IntraVENous Daily Marlon Mathew APRN - CNP   40 mg at 05/09/24 0914    tobramycin (TOBREX) 0.3 % ophthalmic solution 1 drop  1 drop Both Eyes 6 times per day Marlon Mathew APRN - CNP   1 drop at 05/09/24 0923    DOPamine (INTROPIN) 800 mg in dextrose 5 % 250 mL infusion  1-20 mcg/kg/min IntraVENous Continuous Ron Lopez MD 16.8 mL/hr at 05/09/24 1106 15 mcg/kg/min at 05/09/24 1106    doxycycline hyclate (VIBRAMYCIN) capsule 100 mg  100 mg Oral BID Fredrick Sevilla MD   100 mg at 05/09/24 0914    sodium chloride flush 0.9 % injection 5-40 mL  5-40 mL IntraVENous 2 times per day Fredrick Sevilla MD   10 mL at 05/09/24 0924    sodium chloride flush 0.9 % injection 5-40 mL  5-40 mL IntraVENous PRN Fredrick Sevilla MD        0.9 % sodium chloride infusion   IntraVENous PRN Fredrick Sevilla MD        heparin (porcine) injection 5,000 Units  5,000 Units SubCUTAneous 3 times per day Fredrick Sevilla MD   5,000 Units at 05/09/24 0647    ondansetron (ZOFRAN-ODT) disintegrating tablet 4 mg  4 mg Oral Q8H PRN Fredrick Sevilla MD        Or    ondansetron (ZOFRAN) injection 4 mg  4 mg IntraVENous Q6H PRN Fredrick Sevilla MD   4 mg at 05/08/24 2228    polyethylene glycol (GLYCOLAX) packet 17 g  17 g Oral Daily PRN Fredrick Sevilla MD        acetaminophen (TYLENOL) tablet 650 mg  650 mg Oral Q6H PRN Fredrick Sevilla MD   650 mg at 05/08/24 2228    Or    acetaminophen (TYLENOL) suppository 650 mg  650 mg Rectal Q6H PRN Fredrick Sevilla MD        [Held by provider] apixaban (ELIQUIS) tablet 2.5 mg  2.5 mg Oral BID Fredrick Sevilla MD        [Held by

## 2024-05-09 NOTE — ACP (ADVANCE CARE PLANNING)
Advance Care Planning   Healthcare Decision Maker:    Primary Decision Maker: Georgi Khan - Hiram - [phone]    Today we documented Decision Maker(s) consistent with Legal Next of Kin hierarchy.      Electronically signed by Margret Small RN-BC on 5/9/2024 at 7:59 AM

## 2024-05-09 NOTE — PROGRESS NOTES
4 Eyes Skin Assessment     NAME:  Mary Khan  YOB: 1928  MEDICAL RECORD NUMBER:  [id number]    The patient is being assessed for  Admission    I agree that at least one RN has performed a thorough Head to Toe Skin Assessment on the patient. ALL assessment sites listed below have been assessed.      Areas assessed by both nurses:    Head, Face, Ears, Shoulders, Back, Chest, Arms, Elbows, Hands, Sacrum. Buttock, Coccyx, Ischium, Legs. Feet and Heels, and Under Medical Devices    Red blanchable buttocks,   red boggy heels blanchable,   scabs right elbow right knee,   generalized bruising,   vascular discoloration BLE,   right outer leg wound (multiple open areas) red draining,   bilateral eyes red and crusted,        Does the Patient have a Wound? Yes wound(s) were present on assessment. LDA wound assessment was Initiated and completed by RN       Lior Prevention initiated by RN: Yes  Wound Care Orders initiated by RN: Yes    Pressure Injury (Stage 3,4, Unstageable, DTI, NWPT, and Complex wounds) if present, place Wound referral order by RN under : yes    New Ostomies, if present place, Ostomy referral order under : No     Nurse 1 eSignature: Electronically signed by Amanda Lopez RN on 5/8/24 at 11:32 PM EDT    **SHARE this note so that the co-signing nurse can place an eSignature**    Nurse 2 eSignature: Electronically signed by Vanessa Yeboah RN on 5/10/24 at 7:30 AM EDT

## 2024-05-09 NOTE — PROGRESS NOTES
Physical Therapy Initial Evaluation/Plan of Care    Room #:  IC06/IC06-01  Patient Name: Mary Khan  YOB: 1928  MRN: [id number]    Date of Service: 5/9/2024     Tentative placement recommendation: Subacute unless patient meets goals then Home Health Physical Therapy with 24/7 assist/supervision  Equipment recommendation: To be determined      Evaluating Physical Therapist: Ion Mariscal, PT #[id number]      Specific Provider Orders/Date/Referring Provider :   05/09/24 1100    PT eval and treat  Start:  05/09/24 1100,   End:  05/09/24 1100,   ONE TIME,   Standing Count:  1 Occurrences,   R       Comments:  Please only as tolerated  Pedro French MD    Admitting Diagnosis:   Bradycardia [R00.1]  Symptomatic bradycardia [R00.1]      Surgery: none  Visit Diagnoses         Codes    Symptomatic bradycardia    -  Primary R00.1    Chronic diastolic congestive heart failure (HCC)     I50.32            Patient Active Problem List   Diagnosis    Breast cancer (HCC)    Hypertension    Malignant neoplasm of lower-outer quadrant of left female breast (HCC)    Cellulitis of lower extremity    Hyperlipidemia    Chronic back pain    Non-healing ulcer (HCC)    CKD (chronic kidney disease) stage 4, GFR 15-29 ml/min (HCC)    Chronic diastolic heart failure (HCC)    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 Pt pleasantly confused at times. Pt required min A for bed mobility, mod A transfers and gait. Limited by increased HR, tachy and desat to mid 80s with recovery when seated. The patient will benefit from continued skilled therapy to increase strength and improve balance for safe functional  with uterus suspension.    KNEE SURGERY Right 1/28/2024    KNEE INCISION AND DRAINAGE [facility] Silverio Donohue [facility]    [facility]         SUBJECTIVE:    Precautions: Up with assistance, falls , monitor vitals.     Social history: Patient lives alone in a ranch home  with 3 steps, with rail  to enter home.  Tub shower       Equipment owned: Wheeled Walker and Crutches,      AM-PAC Basic Mobility       AM-PAC Basic Mobility - Inpatient   How much help is needed turning from your back to your side while in a flat bed without using bedrails?: A Little  How much help is needed moving from lying on your back to sitting on the side of a flat bed without using bedrails?: A Little  How much help is needed moving to and from a bed to a chair?: A Little  How much help is needed standing up from a chair using your arms?: A Little  How much help is needed walking in hospital room?: A Lot  How much help is needed climbing 3-5 steps with a railing?: A Lot  AM-PAC Inpatient Mobility Raw Score : 16  AM-PAC Inpatient T-Scale Score : 40.78  Mobility Inpatient CMS 0-100% Score: 54.16  Mobility Inpatient CMS G-Code Modifier : CK    Nursing cleared patient for PT evaluation. The admitting diagnosis and active problem list as listed above have been reviewed prior to the initiation of this evaluation.    OBJECTIVE:   Initial Evaluation  Date: 5/9/2024 Treatment Date:     Short Term/ Long Term   Goals   Was pt agreeable to Eval/treatment? Yes  To be met in 3 days   Pain level   0/10       Bed Mobility  Using rails and head of bed elevated:     Rolling: Minimal assist of 1    Supine to sit: Minimal assist of 1    Sit to supine: Not assessed patient in chair    Scooting: Minimal assist of 1    Rolling: Independent    Supine to sit: Independent    Sit to supine: Independent    Scooting: Independent     Transfers Sit to stand: Moderate assist of 1   Sit to stand: Independent    Ambulation     5 feet using  wheeled walker with

## 2024-05-09 NOTE — CARE COORDINATION
Case Management Assessment  Initial Evaluation    Date/Time of Evaluation: 5/9/2024 2:22 PM  Assessment Completed by: Margret Small RN    If patient is discharged prior to next notation, then this note serves as note for discharge by case management.    Patient Name: Mary Khan                   YOB: 1928  Diagnosis: Bradycardia [R00.1]  Symptomatic bradycardia [R00.1]                   Date / Time: 5/8/2024  2:36 PM    Patient Admission Status: Inpatient   Readmission Risk (Low < 19, Mod (19-27), High > 27): Readmission Risk Score: 16.1    Current PCP: Sandeep Ewing MD  PCP verified by CM? Yes    Chart Reviewed: Yes      History Provided by: Patient  Patient Orientation: Alert and Oriented    Patient Cognition: Alert    Hospitalization in the last 30 days (Readmission):  No        Advance Directives:      Code Status: DNR-CCA   Patient's Primary Decision Maker is: Named in Scanned ACP Document (copy of living will and poa-hc in soft chart)    Primary Decision Maker: ErinGeorgi - Hiram - [phone]    Discharge Planning:    Patient lives with: Alone Type of Home: House  Primary Care Giver: Self  Patient Support Systems include: Children, Family Members   Current services prior to admission: Other (Comment) (brett)            Current DME:  WW, WC            Type of Home Care services:  Nursing Services    ADLS  Prior functional level: Independent in ADLs/IADLs, Other (see comment), Mobility (uses a ww at home and  for outings, has [facility])  Current functional level: Independent in ADLs/IADLs, Mobility, Other (see comment) (plans at discharge to home with [facility])    PT AM-PAC: 16 /24      Family can provide assistance at DC: Yes  Would you like Case Management to discuss the discharge plan with any other family members/significant others, and if so, who? Yes (can talk to Georgi only child if needed)  Plans to Return to Present Housing: Yes  Other Identified Issues/Barriers to  individualized plan of care/goals, treatment preferences and shares the quality data associated with the providers. [x] Yes [] No        Margret Small RN-BC  Case Management Department  Ph: [phone]

## 2024-05-09 NOTE — CONSULTS
Today's Date: 5/9/2024  Patient Name: Mary Khan  Date of admission: 5/8/2024  2:36 PM  Patient's age: 89 y.o., 9/3/1928female    Admission Dx: Bradycardia [R00.1]  Symptomatic bradycardia [R00.1]    Requesting Physician: Fredrick Sevilla MD    Chief Complaint   Patient presents with    Dizziness     Sent in by home health nurse for hypotension and dizziness-pt is axox3 with no current complaints       HISTORY OF PRESENT ILLNESS:      89-year-old pleasant lady known to me, has past cardiac history as below, presented to the hospital secondary to low heart rate that was detected by a visiting nurse at home.  She has been on amiodarone and metoprolol.  Her creatinine was elevated.  Did not have any symptoms of chest pain, or severe shortness of breath.  Denied any syncope.    ALLERGIES: None     FAMILY HISTORY:  Positive for hypertension, entire family. Negative for heart and diabetes.      SOCIAL HISTORY: Nonsmoker.  No alcohol use. No illicit drug use..    PAST MEDICAL HISTORY:   03/20/23 Lexiscan nuclear stress test showed unremarkable study and ejection fraction of 82% ___ 03/19/23 2D echo Doppler showed normal LV function and moderate mitral regurgitation and moderate to severe tricuspid regurgitation with moderate to severe pulmonary hypertension ___ Hypertension ___ hypercholesterolemia ___ Breast cancer     PAST SURGICAL HISTORY:  Mastectomy for cancer ___ Eye surgery    Past Medical History:   has a past medical history of Breast cancer (HCC), Cancer (HCC), Chronic back pain, CKD (chronic kidney disease) stage 3, GFR 30-59 ml/min (HCC), Hyperlipidemia, Hypertension, Macular degeneration, Osteoarthritis, and Restless legs syndrome.    Past Surgical History:   has a past surgical history that includes Hysterectomy; Mohs surgery; Colonoscopy (5/27/15); Breast surgery (Left, 6/3/15); Cataract removal with implant (Right, 3 8 16); Breast lumpectomy; Eye surgery (Right, 1/28/2022); and knee surgery  Head W/O Contrast    Result Date: 5/8/2024  EXAMINATION: CT OF THE HEAD WITHOUT CONTRAST  5/8/2024 4:02 pm TECHNIQUE: CT of the head was performed without the administration of intravenous contrast. Automated exposure control, iterative reconstruction, and/or weight based adjustment of the mA/kV was utilized to reduce the radiation dose to as low as reasonably achievable. COMPARISON: 01/27/2024 HISTORY: ORDERING SYSTEM PROVIDED HISTORY: Dizziness TECHNOLOGIST PROVIDED HISTORY: Has a \"code stroke\" or \"stroke alert\" been called?->No Reason for exam:->Dizziness Decision Support Exception - unselect if not a suspected or confirmed emergency medical condition->Emergency Medical Condition (MA) FINDINGS: BRAIN/VENTRICLES: There is no acute intracranial hemorrhage, mass effect or midline shift. No abnormal extra-axial fluid collection.  The gray-white differentiation is maintained without evidence of an acute infarct. There is prominence of the ventricles and sulci due to global parenchymal volume loss. There are nonspecific areas of hypoattenuation within the periventricular and subcortical white matter, which likely represent chronic microvascular ischemic change. There is mild cerebral atrophy with periventricular white matter changes. ORBITS: The visualized portion of the orbits demonstrate no acute abnormality. SINUSES: The visualized paranasal sinuses and mastoid air cells demonstrate no acute abnormality. SOFT TISSUES/SKULL: No acute abnormality of the visualized skull or soft tissues.     1. No acute intracranial abnormality. 2. Mild cerebral atrophy with periventricular white matter changes. 3. Nonspecific white matter changes, likely related to chronic microvascular ischemic disease.     XR CHEST PORTABLE    Result Date: 5/8/2024  EXAMINATION: ONE XRAY VIEW OF THE CHEST 5/8/2024 3:08 pm COMPARISON: Portable chest from 01/27/2024 HISTORY: ORDERING SYSTEM PROVIDED HISTORY: Bradycardia TECHNOLOGIST PROVIDED HISTORY:

## 2024-05-09 NOTE — PLAN OF CARE
Problem: Skin/Tissue Integrity  Goal: Absence of new skin breakdown  Description: 1.  Monitor for areas of redness and/or skin breakdown  2.  Assess vascular access sites hourly  3.  Every 4-6 hours minimum:  Change oxygen saturation probe site  4.  Every 4-6 hours:  If on nasal continuous positive airway pressure, respiratory therapy assess nares and determine need for appliance change or resting period.  5/9/2024 1552 by Gisela Gutierrez RN  Outcome: Progressing  5/9/2024 0254 by Amanda Lopez RN  Outcome: Progressing     Problem: Safety - Adult  Goal: Free from fall injury  5/9/2024 1552 by Gisela Gutierrez RN  Outcome: Progressing  5/9/2024 0254 by Amanda Lopez RN  Outcome: Progressing     Problem: ABCDS Injury Assessment  Goal: Absence of physical injury  5/9/2024 0254 by Amanda Lopez RN  Outcome: Progressing     Problem: Pain  Goal: Verbalizes/displays adequate comfort level or baseline comfort level  Outcome: Progressing

## 2024-05-09 NOTE — CONSULTS
ICU Intensivist Attestation:    I saw, examined, and discussed the patient with Marlon MUNOZ and agree with his assessment and plan.    I have examined the patient myself at the bedside and have gone over a new EKG which does not show any evidence of P waves and it was a junctional rhythm with the patient currently receiving dopamine at 15 mcg/kg/min and off the beta-blocker and amiodarone that she was taking previously.    The patient is very independent at home and has not felt weak except for the last day or 2 prior to admission when she felt somewhat lightheaded as well and came to the hospital.    .  The patient has clearly not been eating or drinking very well as evidenced by the elevated BUN/creatinine.  However, she is eating and drinking freely now in we will hold off on any IV fluids at the moment since she is not hypotensive.    Given the patient's history of amiodarone use, we should keep in mind that the half-life of this drug is quite long and that it may interfere with thyroid metabolism.  Therefore, we will check a TSH and a T4.    She should be out of bed as well but we will ask PT and OT to work with her while she is in the intensive care unit.    I looked at the x-ray myself and there is no current evidence of pneumonia or fluid overload.  Annual echocardiogram is still pending.    Electronically signed by Pedro French MD on 5/9/2024 at 10:39 AM  Assessment and Plan  Patient is a 89 y.o. female with the following medical Problems:     Symptomatic bradycardia  Conjunctivitis with purulent drainage  Acute kidney injury    Most recent hemodynamics reviewed, continue to monitor  Dopamine ordered and infusing  Beta-blockers and amiodarone Home medications held  Eliquis held in the event patient requires pacemaker  CBC, CMP, mag, Phos, ionized calcium ordered  Monitor electrolytes and replace as necessary  Echocardiogram from 5/17/2022 showed EF equals 77%.  E/a flow reversal noted.

## 2024-05-09 NOTE — FLOWSHEET NOTE
Inpatient Wound Care    Admit Date: 5/8/2024  2:36 PM    Reason for consult:  right lower leg    Significant history:    Past Medical History:   Diagnosis Date    Breast cancer (HCC)     Cancer (HCC)     melonoma - back; left breast CA    Chronic back pain     CKD (chronic kidney disease) stage 3, GFR 30-59 ml/min (HCC)     Hyperlipidemia     Hypertension     Macular degeneration     Osteoarthritis     Restless legs syndrome        Wound history:      Findings:     05/09/24 1504   Wound 02/13/24 Pretibial Right #2   Date First Assessed/Time First Assessed: 02/13/24 0905   Present on Original Admission: Yes  Wound Approximate Age at First Assessment (Weeks): 2 weeks  Primary Wound Type: Traumatic  Location: Pretibial  Wound Location Orientation: Right  Wound Descr...   Wound Image    Wound Etiology   (old trauma)   Wound Cleansed Cleansed with saline   Dressing/Treatment Alginate   Wound Length (cm) 10 cm   Wound Width (cm) 10 cm   Wound Surface Area (cm^2) 100 cm^2   Change in Wound Size % (l*w) 51.39   Wound Assessment Pink/red   Drainage Amount Scant (moist but unmeasurable)   Drainage Description Sanguinous   Odor None       **Informed Consent**    The patient has given verbal consent to have photos taken of right leg and inserted into their chart as part of their permanent medical record for purposes of documentation, treatment management and/or medical review.   All Images taken on 5/9/24 of patient name: Mary Khan were transmitted and stored on secured Epic  Site located within Media Folder Tab by a registered [employer] Mobile Application Device.       Impression:  this is from an old injury      Interventions in place:  using alginate    Plan:  Cont to use alginate to pad area  Change every other day  Off load heels      Marcella Shah RN 5/9/2024 3:05 PM

## 2024-05-10 ENCOUNTER — APPOINTMENT (OUTPATIENT)
Dept: GENERAL RADIOLOGY | Age: 89
DRG: 309 | End: 2024-05-10
Payer: MEDICARE

## 2024-05-10 PROBLEM — I50.32 CHRONIC DIASTOLIC CONGESTIVE HEART FAILURE (HCC): Status: ACTIVE | Noted: 2024-05-10

## 2024-05-10 PROBLEM — N18.9 ACUTE KIDNEY INJURY SUPERIMPOSED ON CKD (HCC): Status: ACTIVE | Noted: 2024-05-10

## 2024-05-10 PROBLEM — N17.9 ACUTE KIDNEY INJURY SUPERIMPOSED ON CKD (HCC): Status: ACTIVE | Noted: 2024-05-10

## 2024-05-10 PROBLEM — I48.0 PAROXYSMAL A-FIB (HCC): Status: ACTIVE | Noted: 2024-05-10

## 2024-05-10 PROBLEM — R00.1 SYMPTOMATIC BRADYCARDIA: Status: ACTIVE | Noted: 2024-05-10

## 2024-05-10 LAB
ALBUMIN SERPL-MCNC: 3.5 G/DL (ref 3.5–5.2)
ALP SERPL-CCNC: 105 U/L (ref 35–104)
ALT SERPL-CCNC: 14 U/L (ref 0–32)
ANION GAP SERPL CALCULATED.3IONS-SCNC: 10 MMOL/L (ref 7–16)
ANION GAP SERPL CALCULATED.3IONS-SCNC: 13 MMOL/L (ref 7–16)
AST SERPL-CCNC: 25 U/L (ref 0–31)
BASOPHILS # BLD: 0.02 K/UL (ref 0–0.2)
BASOPHILS NFR BLD: 0 % (ref 0–2)
BILIRUB SERPL-MCNC: 0.5 MG/DL (ref 0–1.2)
BUN SERPL-MCNC: 51 MG/DL (ref 6–23)
BUN SERPL-MCNC: 55 MG/DL (ref 6–23)
CALCIUM SERPL-MCNC: 8.8 MG/DL (ref 8.6–10.2)
CALCIUM SERPL-MCNC: 9.1 MG/DL (ref 8.6–10.2)
CHLORIDE SERPL-SCNC: 103 MMOL/L (ref 98–107)
CHLORIDE SERPL-SCNC: 98 MMOL/L (ref 98–107)
CO2 SERPL-SCNC: 23 MMOL/L (ref 22–29)
CO2 SERPL-SCNC: 24 MMOL/L (ref 22–29)
CREAT SERPL-MCNC: 1.7 MG/DL (ref 0.5–1)
CREAT SERPL-MCNC: 1.9 MG/DL (ref 0.5–1)
EKG ATRIAL RATE: 54 BPM
EKG Q-T INTERVAL: 436 MS
EKG QRS DURATION: 74 MS
EKG QTC CALCULATION (BAZETT): 420 MS
EKG R AXIS: -6 DEGREES
EKG T AXIS: 53 DEGREES
EKG VENTRICULAR RATE: 56 BPM
EOSINOPHIL # BLD: 0.04 K/UL (ref 0.05–0.5)
EOSINOPHILS RELATIVE PERCENT: 1 % (ref 0–6)
ERYTHROCYTE [DISTWIDTH] IN BLOOD BY AUTOMATED COUNT: 14.8 % (ref 11.5–15)
GFR, ESTIMATED: 24 ML/MIN/1.73M2
GFR, ESTIMATED: 27 ML/MIN/1.73M2
GLUCOSE SERPL-MCNC: 131 MG/DL (ref 74–99)
GLUCOSE SERPL-MCNC: 99 MG/DL (ref 74–99)
HCT VFR BLD AUTO: 34.8 % (ref 34–48)
HGB BLD-MCNC: 11.4 G/DL (ref 11.5–15.5)
IMM GRANULOCYTES # BLD AUTO: <0.03 K/UL (ref 0–0.58)
IMM GRANULOCYTES NFR BLD: 0 % (ref 0–5)
LYMPHOCYTES NFR BLD: 1.12 K/UL (ref 1.5–4)
LYMPHOCYTES RELATIVE PERCENT: 19 % (ref 20–42)
MAGNESIUM SERPL-MCNC: 2.4 MG/DL (ref 1.6–2.6)
MCH RBC QN AUTO: 31.8 PG (ref 26–35)
MCHC RBC AUTO-ENTMCNC: 32.8 G/DL (ref 32–34.5)
MCV RBC AUTO: 96.9 FL (ref 80–99.9)
MICROORGANISM SPEC CULT: NORMAL
MONOCYTES NFR BLD: 0.66 K/UL (ref 0.1–0.95)
MONOCYTES NFR BLD: 11 % (ref 2–12)
NEUTROPHILS NFR BLD: 68 % (ref 43–80)
NEUTS SEG NFR BLD: 3.97 K/UL (ref 1.8–7.3)
PHOSPHATE SERPL-MCNC: 3.8 MG/DL (ref 2.5–4.5)
PLATELET # BLD AUTO: 186 K/UL (ref 130–450)
PMV BLD AUTO: 11 FL (ref 7–12)
POTASSIUM SERPL-SCNC: 4.9 MMOL/L (ref 3.5–5)
POTASSIUM SERPL-SCNC: 5.5 MMOL/L (ref 3.5–5)
PROT SERPL-MCNC: 6.3 G/DL (ref 6.4–8.3)
RBC # BLD AUTO: 3.59 M/UL (ref 3.5–5.5)
SODIUM SERPL-SCNC: 134 MMOL/L (ref 132–146)
SODIUM SERPL-SCNC: 137 MMOL/L (ref 132–146)
SPECIMEN DESCRIPTION: NORMAL
WBC OTHER # BLD: 5.8 K/UL (ref 4.5–11.5)

## 2024-05-10 PROCEDURE — 2700000000 HC OXYGEN THERAPY PER DAY

## 2024-05-10 PROCEDURE — 97535 SELF CARE MNGMENT TRAINING: CPT | Performed by: OCCUPATIONAL THERAPIST

## 2024-05-10 PROCEDURE — 97165 OT EVAL LOW COMPLEX 30 MIN: CPT | Performed by: OCCUPATIONAL THERAPIST

## 2024-05-10 PROCEDURE — C9113 INJ PANTOPRAZOLE SODIUM, VIA: HCPCS

## 2024-05-10 PROCEDURE — 2580000003 HC RX 258

## 2024-05-10 PROCEDURE — 71045 X-RAY EXAM CHEST 1 VIEW: CPT

## 2024-05-10 PROCEDURE — 80048 BASIC METABOLIC PNL TOTAL CA: CPT

## 2024-05-10 PROCEDURE — 93010 ELECTROCARDIOGRAM REPORT: CPT | Performed by: INTERNAL MEDICINE

## 2024-05-10 PROCEDURE — 6360000002 HC RX W HCPCS: Performed by: INTERNAL MEDICINE

## 2024-05-10 PROCEDURE — 83735 ASSAY OF MAGNESIUM: CPT

## 2024-05-10 PROCEDURE — 6370000000 HC RX 637 (ALT 250 FOR IP): Performed by: INTERNAL MEDICINE

## 2024-05-10 PROCEDURE — 99291 CRITICAL CARE FIRST HOUR: CPT | Performed by: INTERNAL MEDICINE

## 2024-05-10 PROCEDURE — 99233 SBSQ HOSP IP/OBS HIGH 50: CPT | Performed by: INTERNAL MEDICINE

## 2024-05-10 PROCEDURE — 80053 COMPREHEN METABOLIC PANEL: CPT

## 2024-05-10 PROCEDURE — 97530 THERAPEUTIC ACTIVITIES: CPT | Performed by: OCCUPATIONAL THERAPIST

## 2024-05-10 PROCEDURE — 2000000000 HC ICU R&B

## 2024-05-10 PROCEDURE — 36415 COLL VENOUS BLD VENIPUNCTURE: CPT

## 2024-05-10 PROCEDURE — A4216 STERILE WATER/SALINE, 10 ML: HCPCS

## 2024-05-10 PROCEDURE — 84100 ASSAY OF PHOSPHORUS: CPT

## 2024-05-10 PROCEDURE — 6360000002 HC RX W HCPCS

## 2024-05-10 PROCEDURE — 2580000003 HC RX 258: Performed by: INTERNAL MEDICINE

## 2024-05-10 PROCEDURE — 85025 COMPLETE CBC W/AUTO DIFF WBC: CPT

## 2024-05-10 RX ORDER — DOCUSATE SODIUM 100 MG/1
100 CAPSULE, LIQUID FILLED ORAL DAILY
Status: DISCONTINUED | OUTPATIENT
Start: 2024-05-10 | End: 2024-05-10

## 2024-05-10 RX ORDER — SODIUM CHLORIDE 9 MG/ML
INJECTION, SOLUTION INTRAVENOUS CONTINUOUS
Status: DISCONTINUED | OUTPATIENT
Start: 2024-05-10 | End: 2024-05-13

## 2024-05-10 RX ORDER — HEPARIN SODIUM 5000 [USP'U]/ML
5000 INJECTION, SOLUTION INTRAVENOUS; SUBCUTANEOUS EVERY 8 HOURS SCHEDULED
Status: DISCONTINUED | OUTPATIENT
Start: 2024-05-10 | End: 2024-05-13

## 2024-05-10 RX ORDER — DOCUSATE SODIUM 100 MG/1
100 CAPSULE, LIQUID FILLED ORAL 2 TIMES DAILY
Status: DISCONTINUED | OUTPATIENT
Start: 2024-05-11 | End: 2024-05-15

## 2024-05-10 RX ORDER — POLYETHYLENE GLYCOL 3350 17 G/17G
17 POWDER, FOR SOLUTION ORAL DAILY
Status: DISCONTINUED | OUTPATIENT
Start: 2024-05-11 | End: 2024-05-15

## 2024-05-10 RX ADMIN — DOXYCYCLINE HYCLATE 100 MG: 100 CAPSULE ORAL at 10:18

## 2024-05-10 RX ADMIN — TOBRAMYCIN OPHTHALMIC SOLUTION 1 DROP: 3 SOLUTION/ DROPS OPHTHALMIC at 04:11

## 2024-05-10 RX ADMIN — SODIUM CHLORIDE: 9 INJECTION, SOLUTION INTRAVENOUS at 07:01

## 2024-05-10 RX ADMIN — HEPARIN SODIUM 5000 UNITS: 5000 INJECTION INTRAVENOUS; SUBCUTANEOUS at 06:29

## 2024-05-10 RX ADMIN — APIXABAN 2.5 MG: 2.5 TABLET, FILM COATED ORAL at 10:39

## 2024-05-10 RX ADMIN — DOCUSATE SODIUM 100 MG: 100 CAPSULE, LIQUID FILLED ORAL at 10:39

## 2024-05-10 RX ADMIN — POLYETHYLENE GLYCOL 3350 17 G: 17 POWDER, FOR SOLUTION ORAL at 22:29

## 2024-05-10 RX ADMIN — Medication 10 ML: at 20:49

## 2024-05-10 RX ADMIN — HEPARIN SODIUM 5000 UNITS: 5000 INJECTION INTRAVENOUS; SUBCUTANEOUS at 20:51

## 2024-05-10 RX ADMIN — DOPAMINE HYDROCHLORIDE 15 MCG/KG/MIN: 320 INJECTION, SOLUTION INTRAVENOUS at 02:28

## 2024-05-10 RX ADMIN — DOXYCYCLINE HYCLATE 100 MG: 100 CAPSULE ORAL at 20:48

## 2024-05-10 RX ADMIN — Medication 10 ML: at 10:19

## 2024-05-10 RX ADMIN — SODIUM CHLORIDE 40 MG: 9 INJECTION, SOLUTION INTRAMUSCULAR; INTRAVENOUS; SUBCUTANEOUS at 10:19

## 2024-05-10 ASSESSMENT — PAIN SCALES - GENERAL
PAINLEVEL_OUTOF10: 0

## 2024-05-10 NOTE — PROGRESS NOTES
Pulmonary/Critical Care Progress Note    We are following patient for possible third-degree heart block with junctional rhythm, previous hypotension, weakness, arrhythmias treated with beta-blockers and amiodarone, MOMO/CKD, hyperkalemia, diastolic dysfunction, possible need for pacemaker    SUBJECTIVE:  The patient did very well last night and was in a chair independently yesterday.  She told me precisely what she ate and how much she ate for supper last night.    This morning, 1 can detect P waves on her rhythm strip with amiodarone and metoprolol being held.    However, the potassium was 5.5 but I believe this is secondary to both some volume contraction (patient was on bumetanide) as well as sacubitril/valsartan and (Entresto) both of which may cause hyperkalemia.  These have been held since admission as well.    Therefore, since her blood pressure was 1 30-1 40 systolic, we reduced her dopamine from 15 to 10 mcg/kg/min and will observe closely from then.  It appears that she does have some underlying sinus rhythm after all and may not need a pacemaker.    We will repeat a BMP in several hours time and reassess need for treatment of her hyperkalemia which at this time is modest.  Despite the patient's diastolic dysfunction detected on echocardiogram yesterday, I would hold off any diuretics since I think she is somewhat dehydrated in any case.    MEDICATIONS:   pantoprazole (PROTONIX) 40 mg in sodium chloride (PF) 0.9 % 10 mL injection  40 mg IntraVENous Daily    tobramycin  1 drop Both Eyes 6 times per day    doxycycline hyclate  100 mg Oral BID    sodium chloride flush  5-40 mL IntraVENous 2 times per day    heparin (porcine)  5,000 Units SubCUTAneous 3 times per day    [Held by provider] apixaban  2.5 mg Oral BID    [Held by provider] bumetanide  1 mg Oral Daily    [Held by provider] gabapentin  400 mg Oral Nightly    [Held by provider] sacubitril-valsartan  1 tablet Oral BID      sodium chloride 40 mL/hr at  this note reflect this event.      CRITICAL CARE TIME:  34 minutes    Electronically signed by Pedro French MD on 5/10/2024 at 10:06 AM

## 2024-05-10 NOTE — PLAN OF CARE
Problem: Discharge Planning  Goal: Discharge to home or other facility with appropriate resources  Outcome: Progressing     Problem: Skin/Tissue Integrity  Goal: Absence of new skin breakdown  Description: 1.  Monitor for areas of redness and/or skin breakdown  2.  Assess vascular access sites hourly  3.  Every 4-6 hours minimum:  Change oxygen saturation probe site  4.  Every 4-6 hours:  If on nasal continuous positive airway pressure, respiratory therapy assess nares and determine need for appliance change or resting period.  5/10/2024 0029 by Amanda Lopez RN  Outcome: Progressing       Problem: Safety - Adult  Goal: Free from fall injury  5/10/2024 0029 by Amanda Lopez RN  Outcome: Progressing       Problem: ABCDS Injury Assessment  Goal: Absence of physical injury  Outcome: Progressing     Problem: Pain  Goal: Verbalizes/displays adequate comfort level or baseline comfort level  5/10/2024 0029 by Amanda Lopez RN  Outcome: Progressing    Problem: Chronic Conditions and Co-morbidities  Goal: Patient's chronic conditions and co-morbidity symptoms are monitored and maintained or improved  Outcome: Progressing

## 2024-05-10 NOTE — PROGRESS NOTES
[facility]   ICU Progress Note    Admitting Date and Time: 5/8/2024  2:36 PM  Admit Dx: Bradycardia [R00.1]  Symptomatic bradycardia [R00.1]    Subjective:    Pt feels tired and somewhat woozy. Complains BP cuff on left wrist is very tight.    Per RN: Dopamine drip was at 15 mcg/kg/min but now down to 7.5 mcg/kg/min.       apixaban  2.5 mg Oral BID    docusate sodium  100 mg Oral Daily    pantoprazole (PROTONIX) 40 mg in sodium chloride (PF) 0.9 % 10 mL injection  40 mg IntraVENous Daily    doxycycline hyclate  100 mg Oral BID    sodium chloride flush  5-40 mL IntraVENous 2 times per day    [Held by provider] bumetanide  1 mg Oral Daily    [Held by provider] gabapentin  400 mg Oral Nightly     sodium chloride flush, 5-40 mL, PRN  sodium chloride, , PRN  ondansetron, 4 mg, Q8H PRN   Or  ondansetron, 4 mg, Q6H PRN  polyethylene glycol, 17 g, Daily PRN  acetaminophen, 650 mg, Q6H PRN   Or  acetaminophen, 650 mg, Q6H PRN         Objective:    BP (!) 125/58   Pulse 65   Temp 98.2 °F (36.8 °C) (Oral)   Resp 15   Ht 1.575 m (5' 2.01\")   Wt 59.6 kg (131 lb 6.3 oz)   SpO2 97%   BMI 24.03 kg/m²   General Appearance: alert and oriented to person, place and time and in no acute distress  Skin: warm and dry  Head: normocephalic and atraumatic  Eyes: pupils equal, round, and reactive to light, extraocular eye movements intact, conjunctivae normal; bilateral lower lid ectropion.  Neck: neck supple and non tender without mass   Pulmonary/Chest: clear to auscultation bilaterally- no wheezes, rales or rhonchi, normal air movement, no respiratory distress  Cardiovascular: bradycardic, normal S1 and S2 and no carotid bruits  Abdomen: soft, non-tender, non-distended, normal bowel sounds, no masses or organomegaly  Extremities: no cyanosis, no clubbing and no edema  Neurologic: no cranial nerve deficit and speech normal      Recent Labs     05/09/24  0405 05/10/24  0358 05/10/24  1205    134 137

## 2024-05-10 NOTE — PROGRESS NOTES
Comprehensive Nutrition Assessment    Type and Reason for Visit:  Initial, RD Nutrition Re-Screen/LOS    Nutrition Recommendations/Plan:   Continue current diet   Recommend ONS Ensure Enlive Daily        Malnutrition Assessment:  Malnutrition Status:  Insufficient data (05/10/24 0934)    Context:  Acute Illness     Findings of the 6 clinical characteristics of malnutrition:  Energy Intake:  Mild decrease in energy intake (Comment)  Weight Loss:  Unable to assess (d/t lack of accurate recent wt hx per EMR - 30lb wt loss since 2019)     Body Fat Loss:  Unable to assess (sleeping, covered to chin in blankets)     Muscle Mass Loss:  Unable to assess (sleeping, covered to chin in blankets)    Fluid Accumulation:  No significant fluid accumulation     Strength:  Not Performed    Nutrition Assessment:    Pt admit w/ low heart rate, noted bradycardia. PMHx of breast Ca, CKD Stg 3, HTN/HLD, restless leg syndrome. Noted intake % with improved intake, noted poor intake PTA, sleeping at this time has not had breakfast. Will add ONS to optimize nutrition, continue to monitor while inpatient.    Nutrition Related Findings:    abd soft, NT, ND, active BSx4, generalized edema, I/O's WDL, A&Ox4, sleeping on time of assessment Wound Type: Wound Consult Pending (noted RLL traumatic wound)       Current Nutrition Intake & Therapies:    Average Meal Intake: %  Average Supplements Intake: None Ordered  ADULT DIET; Regular  ADULT ORAL NUTRITION SUPPLEMENT; Lunch; Standard High Calorie/High Protein Oral Supplement    Anthropometric Measures:  Height: 157.5 cm (5' 2.01\")  Ideal Body Weight (IBW): 110 lbs (50 kg)    Admission Body Weight: 61 kg (134 lb 8 oz) (5/8 actual)  Current Body Weight: 59.6 kg (131 lb 6.3 oz) (5/10 bed scale), 119.4 % IBW. Weight Source: Bed Scale  Current BMI (kg/m2): 24  Usual Body Weight:  (IVA d/t lack of recent wt hx per EMR, noted    156 lbs 13 oz in 5/2019)  Weight Adjustment For: No

## 2024-05-10 NOTE — PLAN OF CARE
Problem: Discharge Planning  Goal: Discharge to home or other facility with appropriate resources  5/10/2024 0900 by Sylvie Ann, RN  Outcome: Progressing     Problem: Skin/Tissue Integrity  Goal: Absence of new skin breakdown  Description: 1.  Monitor for areas of redness and/or skin breakdown  2.  Assess vascular access sites hourly  3.  Every 4-6 hours minimum:  Change oxygen saturation probe site  4.  Every 4-6 hours:  If on nasal continuous positive airway pressure, respiratory therapy assess nares and determine need for appliance change or resting period.  5/10/2024 0900 by Sylvie Ann, RN  Outcome: Progressing     Problem: Safety - Adult  Goal: Free from fall injury  5/10/2024 0900 by Sylvie Ann, RN  Outcome: Progressing     Problem: ABCDS Injury Assessment  Goal: Absence of physical injury  5/10/2024 0900 by Sylvie Ann, RN  Outcome: Progressing     Problem: Pain  Goal: Verbalizes/displays adequate comfort level or baseline comfort level  5/10/2024 0900 by Sylvie Ann, RN  Outcome: Progressing     Problem: Chronic Conditions and Co-morbidities  Goal: Patient's chronic conditions and co-morbidity symptoms are monitored and maintained or improved  5/10/2024 0900 by Sylvie Ann, RN  Outcome: Progressing

## 2024-05-10 NOTE — PROGRESS NOTES
OCCUPATIONAL THERAPY INITIAL EVALUATION    [facility]  [address]         Date:5/10/2024                                                   Patient Name: Mary Khan     MRN: [id number]     : 9/3/1928     Room: [place] [address]       Evaluating OT: Hilda Panchal, OTR/L; [id number]       Referring Provider and Orders/Date:    OT eval and treat  Start:  24 1100,   End:  24 1100,   ONE TIME,   Standing Count:  1 Occurrences,   R       Comments:  Please only as tolerated  Pedro French MD    Recommended placement: subacute due to inability to stand at sink for ADLs and limitations with fall risk       Diagnosis:   1. Symptomatic bradycardia    2. Chronic diastolic congestive heart failure (HCC)         Surgery: none      Pertinent Medical History:        Past Medical History:   Diagnosis Date    Breast cancer (HCC)     Cancer (HCC)     melonoma - back; left breast CA    Chronic back pain     CKD (chronic kidney disease) stage 3, GFR 30-59 ml/min (HCC)     Hyperlipidemia     Hypertension     Macular degeneration     Osteoarthritis     Restless legs syndrome           Past Surgical History:   Procedure Laterality Date    BREAST LUMPECTOMY      BREAST SURGERY Left 6/3/15    lumpectomy    CATARACT REMOVAL WITH IMPLANT Right 3 8 16    COLONOSCOPY  5/27/15    EYE SURGERY Right 2022    PARS PLANA VITRECTOMY, ENDO LASER, GAS FLUID EXCHANGE, 25 G, MAC, RIGHT EYE performed by Adria Ibrahim MD at [facility] OR    HYSTERECTOMY (CERVIX STATUS UNKNOWN)      with uterus suspension.    KNEE SURGERY Right 2024    KNEE INCISION AND DRAINAGE performed by Silverio Donohue MD at [facility] OR    MOHS SURGERY         Precautions:  Fall Risk, MRSA        Assessment of current deficits     [x] Functional mobility  [x]ADLs  [x] Strength               []Cognition     [x] Functional transfers   [x] IADLs         [x] Safety Awareness      ADL/Self Care [id number]  14 1    Orthotic Management [id number]       Manual [id number]     Neuro Re-Ed [id number]       Non-Billable Time          Evaluation Time additionally includes thorough review of current medical information, gathering information on past medical history/social history and prior level of function, interpretation of standardized testing/informal observation of tasks, assessment of data and development of plan of care and goals.        Hilda Panchal OTR/L; [id number]

## 2024-05-10 NOTE — CARE COORDINATION
5/10/2024 ICU, Dopamine gtt, 3lnc, Bradycardia, pt up oob eating breakfast. Pt lives alone, therapy recommendations for BETO versus 24/7 care at home with HHC. NAYELY orders needed for [facility] at discharge. Updated [place]. [facility]. CM placed a call to jayro Laureano- to review discharge planning. IF SNF at discharge NO PRECERT, would need DEMETRIA and HENS. Cm following. Electronically signed by Margret Small RN-BC on 5/10/2024 at 10:32 AM

## 2024-05-10 NOTE — PROGRESS NOTES
Date:  5/10/2024  Patient: Mary Khan  Admission:  5/8/2024  2:36 PM  Admit DX: Bradycardia [R00.1]  Symptomatic bradycardia [R00.1]  Age:  89 y.o., 9/3/1928     LOS: 2 days       SUBJECTIVE:      Patient is doing fairly well  No further bradycardia  She is still on low-dose dopamine.  Blood pressure is stable  Denies any cardiac symptoms        OBJECTIVE:    BP (!) 142/78   Pulse 60   Temp 97.6 °F (36.4 °C) (Oral)   Resp 23   Ht 1.575 m (5' 2.01\")   Wt 59.6 kg (131 lb 6.3 oz)   SpO2 98%   BMI 24.03 kg/m²     Intake/Output Summary (Last 24 hours) at 5/10/2024 0756  Last data filed at 5/10/2024 0600  Gross per 24 hour   Intake 1546.66 ml   Output 1800 ml   Net -253.34 ml       EXAM:     Head: Normocephalic, Normal hearing, Oral mucosa is moist.  Neck: Supple, No carotid bruit, No jugular venous distention, No lymphadenopathy.  Respiratory: Lungs are clear to auscultation, Respirations are non-labored, Breath sounds are equal, Symmetrical chest wall expansion.  Cardiovascular: Heart rate in the 60s now, No gallop, Good pulses equal in all extremities, No edema.  Gastrointestinal: Soft, Non-tender, Non-distended, Normal bowel sounds.  Musculoskeletal: Normal strength, No tenderness, No deformity.  Integumentary:  Warm, Dry.    Neurologic: Alert, Oriented, No focal deficits.  Cognition and Speech: Oriented, Speech clear and coherent.  Psychiatric: Cooperative, Appropriate mood & affect, Normal judgment.    Current Inpatient Medications:   pantoprazole (PROTONIX) 40 mg in sodium chloride (PF) 0.9 % 10 mL injection  40 mg IntraVENous Daily    tobramycin  1 drop Both Eyes 6 times per day    doxycycline hyclate  100 mg Oral BID    sodium chloride flush  5-40 mL IntraVENous 2 times per day    heparin (porcine)  5,000 Units SubCUTAneous 3 times per day    [Held by provider] apixaban  2.5 mg Oral BID    [Held by provider] bumetanide  1 mg Oral Daily    [Held by provider] gabapentin  400 mg Oral Nightly

## 2024-05-11 LAB
ALBUMIN SERPL-MCNC: 2.8 G/DL (ref 3.5–5.2)
ALP SERPL-CCNC: 89 U/L (ref 35–104)
ALT SERPL-CCNC: 12 U/L (ref 0–32)
ANION GAP SERPL CALCULATED.3IONS-SCNC: 12 MMOL/L (ref 7–16)
AST SERPL-CCNC: 19 U/L (ref 0–31)
BASOPHILS # BLD: 0.04 K/UL (ref 0–0.2)
BASOPHILS NFR BLD: 1 % (ref 0–2)
BILIRUB SERPL-MCNC: 0.4 MG/DL (ref 0–1.2)
BUN SERPL-MCNC: 47 MG/DL (ref 6–23)
CALCIUM SERPL-MCNC: 8.5 MG/DL (ref 8.6–10.2)
CHLORIDE SERPL-SCNC: 104 MMOL/L (ref 98–107)
CO2 SERPL-SCNC: 21 MMOL/L (ref 22–29)
CREAT SERPL-MCNC: 1.7 MG/DL (ref 0.5–1)
EOSINOPHIL # BLD: 0.08 K/UL (ref 0.05–0.5)
EOSINOPHILS RELATIVE PERCENT: 3 % (ref 0–6)
ERYTHROCYTE [DISTWIDTH] IN BLOOD BY AUTOMATED COUNT: 14.7 % (ref 11.5–15)
GFR, ESTIMATED: 28 ML/MIN/1.73M2
GLUCOSE SERPL-MCNC: 81 MG/DL (ref 74–99)
HCT VFR BLD AUTO: 31.6 % (ref 34–48)
HGB BLD-MCNC: 10.2 G/DL (ref 11.5–15.5)
IMM GRANULOCYTES # BLD AUTO: 0.06 K/UL (ref 0–0.58)
IMM GRANULOCYTES NFR BLD: 2 % (ref 0–5)
LYMPHOCYTES NFR BLD: 1.01 K/UL (ref 1.5–4)
LYMPHOCYTES RELATIVE PERCENT: 31 % (ref 20–42)
MAGNESIUM SERPL-MCNC: 2.1 MG/DL (ref 1.6–2.6)
MCH RBC QN AUTO: 31.5 PG (ref 26–35)
MCHC RBC AUTO-ENTMCNC: 32.3 G/DL (ref 32–34.5)
MCV RBC AUTO: 97.5 FL (ref 80–99.9)
MICROORGANISM SPEC CULT: ABNORMAL
MICROORGANISM SPEC CULT: ABNORMAL
MICROORGANISM/AGENT SPEC: ABNORMAL
MONOCYTES NFR BLD: 0.56 K/UL (ref 0.1–0.95)
MONOCYTES NFR BLD: 17 % (ref 2–12)
NEUTROPHILS NFR BLD: 46 % (ref 43–80)
NEUTS SEG NFR BLD: 1.49 K/UL (ref 1.8–7.3)
PHOSPHATE SERPL-MCNC: 2.8 MG/DL (ref 2.5–4.5)
PLATELET, FLUORESCENCE: 130 K/UL (ref 130–450)
PMV BLD AUTO: 10.6 FL (ref 7–12)
POTASSIUM SERPL-SCNC: 4.5 MMOL/L (ref 3.5–5)
PROT SERPL-MCNC: 5.3 G/DL (ref 6.4–8.3)
RBC # BLD AUTO: 3.24 M/UL (ref 3.5–5.5)
SODIUM SERPL-SCNC: 137 MMOL/L (ref 132–146)
SPECIMEN DESCRIPTION: ABNORMAL
WBC OTHER # BLD: 3.2 K/UL (ref 4.5–11.5)

## 2024-05-11 PROCEDURE — 84100 ASSAY OF PHOSPHORUS: CPT

## 2024-05-11 PROCEDURE — 6370000000 HC RX 637 (ALT 250 FOR IP)

## 2024-05-11 PROCEDURE — 99232 SBSQ HOSP IP/OBS MODERATE 35: CPT | Performed by: INTERNAL MEDICINE

## 2024-05-11 PROCEDURE — 6370000000 HC RX 637 (ALT 250 FOR IP): Performed by: INTERNAL MEDICINE

## 2024-05-11 PROCEDURE — 2000000000 HC ICU R&B

## 2024-05-11 PROCEDURE — 36415 COLL VENOUS BLD VENIPUNCTURE: CPT

## 2024-05-11 PROCEDURE — 83735 ASSAY OF MAGNESIUM: CPT

## 2024-05-11 PROCEDURE — 2580000003 HC RX 258

## 2024-05-11 PROCEDURE — 6360000002 HC RX W HCPCS

## 2024-05-11 PROCEDURE — 99291 CRITICAL CARE FIRST HOUR: CPT | Performed by: INTERNAL MEDICINE

## 2024-05-11 PROCEDURE — 2580000003 HC RX 258: Performed by: INTERNAL MEDICINE

## 2024-05-11 PROCEDURE — 2700000000 HC OXYGEN THERAPY PER DAY

## 2024-05-11 PROCEDURE — C9113 INJ PANTOPRAZOLE SODIUM, VIA: HCPCS

## 2024-05-11 PROCEDURE — 85025 COMPLETE CBC W/AUTO DIFF WBC: CPT

## 2024-05-11 PROCEDURE — A4216 STERILE WATER/SALINE, 10 ML: HCPCS

## 2024-05-11 PROCEDURE — 6360000002 HC RX W HCPCS: Performed by: INTERNAL MEDICINE

## 2024-05-11 PROCEDURE — 80053 COMPREHEN METABOLIC PANEL: CPT

## 2024-05-11 RX ORDER — DOPAMINE HYDROCHLORIDE 320 MG/100ML
1 INJECTION, SOLUTION INTRAVENOUS CONTINUOUS
Status: DISCONTINUED | OUTPATIENT
Start: 2024-05-11 | End: 2024-05-13

## 2024-05-11 RX ORDER — DOPAMINE HYDROCHLORIDE 320 MG/100ML
1 INJECTION, SOLUTION INTRAVENOUS CONTINUOUS
Status: DISCONTINUED | OUTPATIENT
Start: 2024-05-11 | End: 2024-05-11 | Stop reason: SDUPTHER

## 2024-05-11 RX ORDER — PANTOPRAZOLE SODIUM 40 MG/1
40 TABLET, DELAYED RELEASE ORAL
Status: DISCONTINUED | OUTPATIENT
Start: 2024-05-12 | End: 2024-05-16 | Stop reason: HOSPADM

## 2024-05-11 RX ADMIN — ACETAMINOPHEN 650 MG: 325 TABLET ORAL at 04:52

## 2024-05-11 RX ADMIN — HEPARIN SODIUM 5000 UNITS: 5000 INJECTION INTRAVENOUS; SUBCUTANEOUS at 06:26

## 2024-05-11 RX ADMIN — Medication 10 ML: at 22:24

## 2024-05-11 RX ADMIN — DOCUSATE SODIUM 100 MG: 100 CAPSULE, LIQUID FILLED ORAL at 09:44

## 2024-05-11 RX ADMIN — HEPARIN SODIUM 5000 UNITS: 5000 INJECTION INTRAVENOUS; SUBCUTANEOUS at 22:24

## 2024-05-11 RX ADMIN — HEPARIN SODIUM 5000 UNITS: 5000 INJECTION INTRAVENOUS; SUBCUTANEOUS at 14:40

## 2024-05-11 RX ADMIN — POLYETHYLENE GLYCOL 3350 17 G: 17 POWDER, FOR SOLUTION ORAL at 18:27

## 2024-05-11 RX ADMIN — ACETAMINOPHEN 650 MG: 325 TABLET ORAL at 14:40

## 2024-05-11 RX ADMIN — Medication 10 ML: at 11:17

## 2024-05-11 RX ADMIN — SODIUM CHLORIDE 40 MG: 9 INJECTION, SOLUTION INTRAMUSCULAR; INTRAVENOUS; SUBCUTANEOUS at 09:42

## 2024-05-11 RX ADMIN — DOXYCYCLINE HYCLATE 100 MG: 100 CAPSULE ORAL at 09:43

## 2024-05-11 RX ADMIN — SODIUM CHLORIDE: 9 INJECTION, SOLUTION INTRAVENOUS at 08:05

## 2024-05-11 RX ADMIN — DOCUSATE SODIUM 100 MG: 100 CAPSULE, LIQUID FILLED ORAL at 21:03

## 2024-05-11 RX ADMIN — ACETAMINOPHEN 650 MG: 325 TABLET ORAL at 21:03

## 2024-05-11 ASSESSMENT — PAIN SCALES - GENERAL
PAINLEVEL_OUTOF10: 3
PAINLEVEL_OUTOF10: 3
PAINLEVEL_OUTOF10: 0
PAINLEVEL_OUTOF10: 4
PAINLEVEL_OUTOF10: 2
PAINLEVEL_OUTOF10: 3
PAINLEVEL_OUTOF10: 5

## 2024-05-11 ASSESSMENT — PAIN DESCRIPTION - PAIN TYPE: TYPE: CHRONIC PAIN

## 2024-05-11 ASSESSMENT — PAIN DESCRIPTION - LOCATION
LOCATION: BACK

## 2024-05-11 ASSESSMENT — PAIN DESCRIPTION - DESCRIPTORS: DESCRIPTORS: ACHING

## 2024-05-11 NOTE — PROGRESS NOTES
Assessment and Plan  Patient is a 89 y.o. female with the following medical Problems:     Symptomatic bradycardia  Paroxysmal atrial fibrillation  Conjunctivitis with purulent drainage  Acute kidney injury  Hyperkalemia  Heart failure with preserved ejection fraction  Hiatal hernia    Most recent hemodynamics reviewed, continue to monitor  Patient has been intermittently off dopamine.  Beta-blockers and amiodarone Home medications held  Eliquis held in the event patient requires pacemaker  CBC, CMP, mag, Phos, ionized calcium ordered  Monitor electrolytes and replace as necessary  Echocardiogram from 5/17/2022 showed EF equals 77%.  E/a flow reversal noted.  Suggestive of diastolic dysfunction.  Left atrium mildly dilated.  Borderline dilated right ventricle.  Mildly enlarged right atrium size.  Mild to moderate mitral regurgitation is present.  Mild tricuspid regurgitation.  Mild pulmonary hypertension.  Echocardiogram with normal EF.  EKG ordered as needed  Tobramycin eyedrops ordered  Oxygen therapy protocol ordered  Accurate I's and O's ordered every 8 hours  CT head without contrast showed no acute intercranial abnormality.  Chest x-ray showed new mild left basilar atelectasis.  Large hiatal hernia.  Otherwise no acute active disease seen in the chest.  Creatinine 2.8, repeat CMP and continue to monitor and trend  DVT prophylaxis heparin  PPI prophylaxis Protonix  CODE STATUS: Per chart review patient stated she had a living will and she is DNR.  For the purpose of treatment she has elected to change that to DNR CCA      History of Present Illness: Shanae Khan is 89-year-old  female with past medical history significant for lower extremity cellulitis, HLD, chronic back pain, right leg nonhealing ulcer, CKD, CHF, HTN, breast CA stage Ia, and lymphedema presented to ED with reports of low heart rate while at home and receiving evaluation from home nurse.  Patient seen and examined in the ICU at

## 2024-05-11 NOTE — PROGRESS NOTES
[facility]   ICU Progress Note    Admitting Date and Time: 5/8/2024  2:36 PM  Admit Dx: Bradycardia [R00.1]  Symptomatic bradycardia [R00.1]    Subjective:    5/10: Pt feels tired and somewhat woozy. Complains BP cuff on left wrist is very tight.  Per RN, Dopamine drip was at 15 mcg/kg/min but now down to 7.5 mcg/kg/min.    5/11: Pt resting comfortably in bed. She states she is tired.    Per RN: Dopamine drip on intermittently. She was off for several hours last evening and then again early this morning. She is now back at 1 mcg/kg/min. Patient was complaining of constipation and needed to be manually disimpacted.       [START ON 5/12/2024] pantoprazole  40 mg Oral QAM AC    [Held by provider] apixaban  2.5 mg Oral BID    heparin (porcine)  5,000 Units SubCUTAneous 3 times per day    polyethylene glycol  17 g Oral Daily    docusate sodium  100 mg Oral BID    sodium chloride flush  5-40 mL IntraVENous 2 times per day    [Held by provider] bumetanide  1 mg Oral Daily    [Held by provider] gabapentin  400 mg Oral Nightly     sodium chloride flush, 5-40 mL, PRN  sodium chloride, , PRN  ondansetron, 4 mg, Q8H PRN   Or  ondansetron, 4 mg, Q6H PRN  polyethylene glycol, 17 g, Daily PRN  acetaminophen, 650 mg, Q6H PRN   Or  acetaminophen, 650 mg, Q6H PRN         Objective:    BP 97/62   Pulse 70   Temp 97.8 °F (36.6 °C) (Oral)   Resp 17   Ht 1.575 m (5' 2.01\")   Wt 59.9 kg (132 lb 0.9 oz)   SpO2 98%   BMI 24.15 kg/m²   General Appearance: alert and oriented to person, place and time and in no acute distress  Skin: warm and dry  Head: normocephalic and atraumatic  Eyes: pupils equal, round, and reactive to light, extraocular eye movements intact, conjunctivae normal; bilateral lower lid ectropion.  Neck: neck supple and non tender without mass   Pulmonary/Chest: clear to auscultation bilaterally- no wheezes, rales or rhonchi, normal air movement, no respiratory distress  Cardiovascular:

## 2024-05-12 LAB
ALBUMIN SERPL-MCNC: 3.1 G/DL (ref 3.5–5.2)
ALP SERPL-CCNC: 93 U/L (ref 35–104)
ALT SERPL-CCNC: 12 U/L (ref 0–32)
ANION GAP SERPL CALCULATED.3IONS-SCNC: 10 MMOL/L (ref 7–16)
AST SERPL-CCNC: 20 U/L (ref 0–31)
BASOPHILS # BLD: 0.03 K/UL (ref 0–0.2)
BASOPHILS NFR BLD: 1 % (ref 0–2)
BILIRUB SERPL-MCNC: 0.5 MG/DL (ref 0–1.2)
BUN SERPL-MCNC: 40 MG/DL (ref 6–23)
CALCIUM SERPL-MCNC: 8.8 MG/DL (ref 8.6–10.2)
CHLORIDE SERPL-SCNC: 104 MMOL/L (ref 98–107)
CO2 SERPL-SCNC: 23 MMOL/L (ref 22–29)
CREAT SERPL-MCNC: 1.5 MG/DL (ref 0.5–1)
EOSINOPHIL # BLD: 0.11 K/UL (ref 0.05–0.5)
EOSINOPHILS RELATIVE PERCENT: 4 % (ref 0–6)
ERYTHROCYTE [DISTWIDTH] IN BLOOD BY AUTOMATED COUNT: 14.9 % (ref 11.5–15)
GFR, ESTIMATED: 31 ML/MIN/1.73M2
GLUCOSE SERPL-MCNC: 76 MG/DL (ref 74–99)
HCT VFR BLD AUTO: 31.2 % (ref 34–48)
HGB BLD-MCNC: 9.9 G/DL (ref 11.5–15.5)
IMM GRANULOCYTES # BLD AUTO: <0.03 K/UL (ref 0–0.58)
IMM GRANULOCYTES NFR BLD: 0 % (ref 0–5)
LYMPHOCYTES NFR BLD: 0.97 K/UL (ref 1.5–4)
LYMPHOCYTES RELATIVE PERCENT: 31 % (ref 20–42)
MAGNESIUM SERPL-MCNC: 1.9 MG/DL (ref 1.6–2.6)
MCH RBC QN AUTO: 31.1 PG (ref 26–35)
MCHC RBC AUTO-ENTMCNC: 31.7 G/DL (ref 32–34.5)
MCV RBC AUTO: 98.1 FL (ref 80–99.9)
MONOCYTES NFR BLD: 0.46 K/UL (ref 0.1–0.95)
MONOCYTES NFR BLD: 15 % (ref 2–12)
NEUTROPHILS NFR BLD: 50 % (ref 43–80)
NEUTS SEG NFR BLD: 1.56 K/UL (ref 1.8–7.3)
PHOSPHATE SERPL-MCNC: 2.7 MG/DL (ref 2.5–4.5)
PLATELET # BLD AUTO: 158 K/UL (ref 130–450)
PMV BLD AUTO: 10.5 FL (ref 7–12)
POTASSIUM SERPL-SCNC: 4.5 MMOL/L (ref 3.5–5)
PROT SERPL-MCNC: 5.4 G/DL (ref 6.4–8.3)
RBC # BLD AUTO: 3.18 M/UL (ref 3.5–5.5)
SODIUM SERPL-SCNC: 137 MMOL/L (ref 132–146)
WBC OTHER # BLD: 3.1 K/UL (ref 4.5–11.5)

## 2024-05-12 PROCEDURE — 2700000000 HC OXYGEN THERAPY PER DAY

## 2024-05-12 PROCEDURE — 85025 COMPLETE CBC W/AUTO DIFF WBC: CPT

## 2024-05-12 PROCEDURE — 6370000000 HC RX 637 (ALT 250 FOR IP): Performed by: INTERNAL MEDICINE

## 2024-05-12 PROCEDURE — 80053 COMPREHEN METABOLIC PANEL: CPT

## 2024-05-12 PROCEDURE — 84100 ASSAY OF PHOSPHORUS: CPT

## 2024-05-12 PROCEDURE — 6360000002 HC RX W HCPCS

## 2024-05-12 PROCEDURE — 6360000002 HC RX W HCPCS: Performed by: INTERNAL MEDICINE

## 2024-05-12 PROCEDURE — 36415 COLL VENOUS BLD VENIPUNCTURE: CPT

## 2024-05-12 PROCEDURE — 99291 CRITICAL CARE FIRST HOUR: CPT | Performed by: INTERNAL MEDICINE

## 2024-05-12 PROCEDURE — 6370000000 HC RX 637 (ALT 250 FOR IP)

## 2024-05-12 PROCEDURE — 99232 SBSQ HOSP IP/OBS MODERATE 35: CPT | Performed by: INTERNAL MEDICINE

## 2024-05-12 PROCEDURE — 2580000003 HC RX 258: Performed by: INTERNAL MEDICINE

## 2024-05-12 PROCEDURE — 2000000000 HC ICU R&B

## 2024-05-12 PROCEDURE — 83735 ASSAY OF MAGNESIUM: CPT

## 2024-05-12 RX ORDER — CALCIUM CARBONATE 500 MG/1
500 TABLET, CHEWABLE ORAL 3 TIMES DAILY PRN
Status: DISCONTINUED | OUTPATIENT
Start: 2024-05-12 | End: 2024-05-16 | Stop reason: HOSPADM

## 2024-05-12 RX ORDER — MAGNESIUM SULFATE 1 G/100ML
1000 INJECTION INTRAVENOUS ONCE
Status: COMPLETED | OUTPATIENT
Start: 2024-05-12 | End: 2024-05-12

## 2024-05-12 RX ADMIN — HEPARIN SODIUM 5000 UNITS: 5000 INJECTION INTRAVENOUS; SUBCUTANEOUS at 14:09

## 2024-05-12 RX ADMIN — HEPARIN SODIUM 5000 UNITS: 5000 INJECTION INTRAVENOUS; SUBCUTANEOUS at 06:40

## 2024-05-12 RX ADMIN — Medication 10 ML: at 14:09

## 2024-05-12 RX ADMIN — ACETAMINOPHEN 650 MG: 325 TABLET ORAL at 11:05

## 2024-05-12 RX ADMIN — DOPAMINE HYDROCHLORIDE 1 MCG/KG/MIN: 320 INJECTION, SOLUTION INTRAVENOUS at 12:09

## 2024-05-12 RX ADMIN — DOCUSATE SODIUM 100 MG: 100 CAPSULE, LIQUID FILLED ORAL at 09:13

## 2024-05-12 RX ADMIN — SODIUM CHLORIDE: 9 INJECTION, SOLUTION INTRAVENOUS at 05:16

## 2024-05-12 RX ADMIN — PANTOPRAZOLE SODIUM 40 MG: 40 TABLET, DELAYED RELEASE ORAL at 06:41

## 2024-05-12 RX ADMIN — CALCIUM CARBONATE (ANTACID) CHEW TAB 500 MG 500 MG: 500 CHEW TAB at 15:35

## 2024-05-12 RX ADMIN — ONDANSETRON 4 MG: 4 TABLET, ORALLY DISINTEGRATING ORAL at 16:38

## 2024-05-12 RX ADMIN — MAGNESIUM SULFATE IN DEXTROSE 1000 MG: 10 INJECTION, SOLUTION INTRAVENOUS at 06:52

## 2024-05-12 RX ADMIN — HEPARIN SODIUM 5000 UNITS: 5000 INJECTION INTRAVENOUS; SUBCUTANEOUS at 22:00

## 2024-05-12 RX ADMIN — Medication 10 ML: at 22:00

## 2024-05-12 ASSESSMENT — PAIN SCALES - GENERAL
PAINLEVEL_OUTOF10: 4
PAINLEVEL_OUTOF10: 0
PAINLEVEL_OUTOF10: 1
PAINLEVEL_OUTOF10: 0
PAINLEVEL_OUTOF10: 2

## 2024-05-12 ASSESSMENT — PAIN DESCRIPTION - ORIENTATION: ORIENTATION: POSTERIOR

## 2024-05-12 ASSESSMENT — PAIN DESCRIPTION - DESCRIPTORS: DESCRIPTORS: ACHING

## 2024-05-12 NOTE — PLAN OF CARE
Problem: Discharge Planning  Goal: Discharge to home or other facility with appropriate resources  5/12/2024 1443 by Gisela Gutierrez RN  Outcome: Progressing  5/12/2024 0155 by Monae Levi RN  Outcome: Progressing     Problem: Skin/Tissue Integrity  Goal: Absence of new skin breakdown  Description: 1.  Monitor for areas of redness and/or skin breakdown  2.  Assess vascular access sites hourly  3.  Every 4-6 hours minimum:  Change oxygen saturation probe site  4.  Every 4-6 hours:  If on nasal continuous positive airway pressure, respiratory therapy assess nares and determine need for appliance change or resting period.  5/12/2024 1443 by Gisela Gutierrez RN  Outcome: Progressing  5/12/2024 0155 by Monae Levi RN  Outcome: Progressing     Problem: Safety - Adult  Goal: Free from fall injury  5/12/2024 1443 by Gisela Gutierrez RN  Outcome: Progressing  5/12/2024 0155 by Monae Levi RN  Outcome: Progressing  Flowsheets (Taken 5/12/2024 0152)  Free From Fall Injury:   Based on caregiver fall risk screen, instruct family/caregiver to ask for assistance with transferring infant if caregiver noted to have fall risk factors   Instruct family/caregiver on patient safety     Problem: ABCDS Injury Assessment  Goal: Absence of physical injury  5/12/2024 1443 by Gisela Gutierrez RN  Outcome: Progressing  5/12/2024 0155 by Monae Levi RN  Outcome: Progressing  Flowsheets (Taken 5/12/2024 0152)  Absence of Physical Injury: Implement safety measures based on patient assessment     Problem: Pain  Goal: Verbalizes/displays adequate comfort level or baseline comfort level  5/12/2024 1443 by Gisela Gutierrez RN  Outcome: Progressing  5/12/2024 0155 by Monae Levi RN  Outcome: Progressing     Problem: Nutrition Deficit:  Goal: Optimize nutritional status  5/12/2024 0155 by Monae Levi RN  Outcome: Progressing

## 2024-05-12 NOTE — PLAN OF CARE
Problem: Discharge Planning  Goal: Discharge to home or other facility with appropriate resources  5/12/2024 0155 by Monae Levi RN  Outcome: Progressing  5/11/2024 1612 by Gisela Gutierrez RN  Outcome: Progressing     Problem: Skin/Tissue Integrity  Goal: Absence of new skin breakdown  Description: 1.  Monitor for areas of redness and/or skin breakdown  2.  Assess vascular access sites hourly  3.  Every 4-6 hours minimum:  Change oxygen saturation probe site  4.  Every 4-6 hours:  If on nasal continuous positive airway pressure, respiratory therapy assess nares and determine need for appliance change or resting period.  5/12/2024 0155 by Monae Levi RN  Outcome: Progressing  5/11/2024 1612 by Gisela Gutierrez RN  Outcome: Progressing     Problem: ABCDS Injury Assessment  Goal: Absence of physical injury  Outcome: Progressing  Flowsheets (Taken 5/12/2024 0152)  Absence of Physical Injury: Implement safety measures based on patient assessment     Problem: Pain  Goal: Verbalizes/displays adequate comfort level or baseline comfort level  5/12/2024 0155 by Monae Levi RN  Outcome: Progressing  5/11/2024 1612 by Gisela Gutierrez RN  Outcome: Progressing     Problem: Chronic Conditions and Co-morbidities  Goal: Patient's chronic conditions and co-morbidity symptoms are monitored and maintained or improved  Outcome: Progressing     Problem: Nutrition Deficit:  Goal: Optimize nutritional status  5/12/2024 0155 by Monae Levi RN  Outcome: Progressing  5/11/2024 1612 by Gisela Gutierrez RN  Outcome: Progressing

## 2024-05-12 NOTE — PROGRESS NOTES
[facility]   ICU Progress Note    Admitting Date and Time: 5/8/2024  2:36 PM  Admit Dx: Bradycardia [R00.1]  Symptomatic bradycardia [R00.1]    Subjective:    5/10: Pt feels tired and somewhat woozy. Complains BP cuff on left wrist is very tight.  Per RN, Dopamine drip was at 15 mcg/kg/min but now down to 7.5 mcg/kg/min.    5/11: Pt resting comfortably in bed. She states she is tired.  Per RN Dopamine drip on intermittently. She was off for several hours last evening and then again early this morning. She is now back at 1 mcg/kg/min. Patient was complaining of constipation and needed to be manually disimpacted.    5/12: Pt was asking for something \"peppermint\" for her stomach. When I asked her to clarify she stated and said Tums.    Per RN: remains on Dopamine 1 mcg/kg/min to help with kidney perfusion per intensivist.       pantoprazole  40 mg Oral QAM AC    [Held by provider] apixaban  2.5 mg Oral BID    heparin (porcine)  5,000 Units SubCUTAneous 3 times per day    polyethylene glycol  17 g Oral Daily    docusate sodium  100 mg Oral BID    sodium chloride flush  5-40 mL IntraVENous 2 times per day    [Held by provider] bumetanide  1 mg Oral Daily    [Held by provider] gabapentin  400 mg Oral Nightly     sodium chloride flush, 5-40 mL, PRN  sodium chloride, , PRN  ondansetron, 4 mg, Q8H PRN   Or  ondansetron, 4 mg, Q6H PRN  polyethylene glycol, 17 g, Daily PRN  acetaminophen, 650 mg, Q6H PRN   Or  acetaminophen, 650 mg, Q6H PRN         Objective:    BP (!) 144/68   Pulse 57   Temp 97.8 °F (36.6 °C) (Oral)   Resp 14   Ht 1.575 m (5' 2.01\")   Wt 61.6 kg (135 lb 14.4 oz)   SpO2 96%   BMI 24.85 kg/m²   General Appearance: alert] and in no acute distress. Hard of hearing.  Skin: warm and dry  Head: normocephalic and atraumatic  Eyes: pupils equal, round, and reactive to light, extraocular eye movements intact, conjunctivae normal; bilateral lower lid ectropion.  Neck: neck supple  computerized transcription errors may be present.     Electronically signed by José Luis Sin MD on 5/12/2024 at 12:45 PM

## 2024-05-12 NOTE — PROGRESS NOTES
Assessment and Plan  Patient is a 89 y.o. female with the following medical Problems:     Symptomatic bradycardia  Paroxysmal atrial fibrillation  Conjunctivitis with purulent drainage  Acute kidney injury  Hyperkalemia  Heart failure with preserved ejection fraction  Hiatal hernia    Most recent hemodynamics reviewed, continue to monitor  Patient has been intermittently off dopamine.  Beta-blockers and amiodarone Home medications held  Dopamine at 1 mcg/kg/min to help with kidney perfusion.  CBC, CMP, mag, Phos, ionized calcium ordered  Monitor electrolytes and replace as necessary  Echocardiogram from 5/17/2022 showed EF equals 77%.  E/a flow reversal noted.  Suggestive of diastolic dysfunction.  Left atrium mildly dilated.  Borderline dilated right ventricle.  Mildly enlarged right atrium size.  Mild to moderate mitral regurgitation is present.  Mild tricuspid regurgitation.  Mild pulmonary hypertension.  Echocardiogram with normal EF.  EKG ordered as needed  Tobramycin eyedrops ordered  Oxygen therapy protocol ordered  Accurate I's and O's ordered every 8 hours  CT head without contrast showed no acute intercranial abnormality.  Chest x-ray showed new mild left basilar atelectasis.  Large hiatal hernia.  Otherwise no acute active disease seen in the chest.  Creatinine 2.8, repeat CMP and continue to monitor and trend  DVT prophylaxis heparin  PPI prophylaxis Protonix  CODE STATUS: Per chart review patient stated she had a living will and she is DNR.  For the purpose of treatment she has elected to change that to DNR CCA      History of Present Illness: Shanae Khan is 89-year-old  female with past medical history significant for lower extremity cellulitis, HLD, chronic back pain, right leg nonhealing ulcer, CKD, CHF, HTN, breast CA stage Ia, and lymphedema presented to ED with reports of low heart rate while at home and receiving evaluation from home nurse.  Patient seen and examined in the ICU at

## 2024-05-13 ENCOUNTER — APPOINTMENT (OUTPATIENT)
Dept: GENERAL RADIOLOGY | Age: 89
DRG: 309 | End: 2024-05-13
Payer: MEDICARE

## 2024-05-13 LAB
ALBUMIN SERPL-MCNC: 3 G/DL (ref 3.5–5.2)
ALP SERPL-CCNC: 87 U/L (ref 35–104)
ALT SERPL-CCNC: 12 U/L (ref 0–32)
ANION GAP SERPL CALCULATED.3IONS-SCNC: 8 MMOL/L (ref 7–16)
AST SERPL-CCNC: 21 U/L (ref 0–31)
BASOPHILS # BLD: 0.03 K/UL (ref 0–0.2)
BASOPHILS NFR BLD: 1 % (ref 0–2)
BILIRUB SERPL-MCNC: 0.4 MG/DL (ref 0–1.2)
BUN SERPL-MCNC: 34 MG/DL (ref 6–23)
CALCIUM SERPL-MCNC: 9 MG/DL (ref 8.6–10.2)
CHLORIDE SERPL-SCNC: 107 MMOL/L (ref 98–107)
CO2 SERPL-SCNC: 23 MMOL/L (ref 22–29)
CREAT SERPL-MCNC: 1.5 MG/DL (ref 0.5–1)
EOSINOPHIL # BLD: 0.09 K/UL (ref 0.05–0.5)
EOSINOPHILS RELATIVE PERCENT: 3 % (ref 0–6)
ERYTHROCYTE [DISTWIDTH] IN BLOOD BY AUTOMATED COUNT: 15.1 % (ref 11.5–15)
GFR, ESTIMATED: 32 ML/MIN/1.73M2
GLUCOSE SERPL-MCNC: 75 MG/DL (ref 74–99)
HCT VFR BLD AUTO: 29.1 % (ref 34–48)
HGB BLD-MCNC: 9.3 G/DL (ref 11.5–15.5)
IMM GRANULOCYTES # BLD AUTO: <0.03 K/UL (ref 0–0.58)
IMM GRANULOCYTES NFR BLD: 0 % (ref 0–5)
LYMPHOCYTES NFR BLD: 1 K/UL (ref 1.5–4)
LYMPHOCYTES RELATIVE PERCENT: 31 % (ref 20–42)
MAGNESIUM SERPL-MCNC: 1.9 MG/DL (ref 1.6–2.6)
MCH RBC QN AUTO: 31.7 PG (ref 26–35)
MCHC RBC AUTO-ENTMCNC: 32 G/DL (ref 32–34.5)
MCV RBC AUTO: 99.3 FL (ref 80–99.9)
MONOCYTES NFR BLD: 0.49 K/UL (ref 0.1–0.95)
MONOCYTES NFR BLD: 15 % (ref 2–12)
NEUTROPHILS NFR BLD: 50 % (ref 43–80)
NEUTS SEG NFR BLD: 1.65 K/UL (ref 1.8–7.3)
PHOSPHATE SERPL-MCNC: 3 MG/DL (ref 2.5–4.5)
PLATELET # BLD AUTO: 168 K/UL (ref 130–450)
PMV BLD AUTO: 10.5 FL (ref 7–12)
POTASSIUM SERPL-SCNC: 4.4 MMOL/L (ref 3.5–5)
PROT SERPL-MCNC: 5.2 G/DL (ref 6.4–8.3)
RBC # BLD AUTO: 2.93 M/UL (ref 3.5–5.5)
SODIUM SERPL-SCNC: 138 MMOL/L (ref 132–146)
WBC OTHER # BLD: 3.3 K/UL (ref 4.5–11.5)

## 2024-05-13 PROCEDURE — 6370000000 HC RX 637 (ALT 250 FOR IP)

## 2024-05-13 PROCEDURE — 99291 CRITICAL CARE FIRST HOUR: CPT | Performed by: INTERNAL MEDICINE

## 2024-05-13 PROCEDURE — 2580000003 HC RX 258: Performed by: INTERNAL MEDICINE

## 2024-05-13 PROCEDURE — 6360000002 HC RX W HCPCS: Performed by: INTERNAL MEDICINE

## 2024-05-13 PROCEDURE — 84100 ASSAY OF PHOSPHORUS: CPT

## 2024-05-13 PROCEDURE — 83735 ASSAY OF MAGNESIUM: CPT

## 2024-05-13 PROCEDURE — 2700000000 HC OXYGEN THERAPY PER DAY

## 2024-05-13 PROCEDURE — 6360000002 HC RX W HCPCS

## 2024-05-13 PROCEDURE — 6370000000 HC RX 637 (ALT 250 FOR IP): Performed by: INTERNAL MEDICINE

## 2024-05-13 PROCEDURE — 80053 COMPREHEN METABOLIC PANEL: CPT

## 2024-05-13 PROCEDURE — 36415 COLL VENOUS BLD VENIPUNCTURE: CPT

## 2024-05-13 PROCEDURE — 97530 THERAPEUTIC ACTIVITIES: CPT

## 2024-05-13 PROCEDURE — 71045 X-RAY EXAM CHEST 1 VIEW: CPT

## 2024-05-13 PROCEDURE — 97110 THERAPEUTIC EXERCISES: CPT

## 2024-05-13 PROCEDURE — 99232 SBSQ HOSP IP/OBS MODERATE 35: CPT | Performed by: INTERNAL MEDICINE

## 2024-05-13 PROCEDURE — 2000000000 HC ICU R&B

## 2024-05-13 PROCEDURE — 85025 COMPLETE CBC W/AUTO DIFF WBC: CPT

## 2024-05-13 RX ORDER — GABAPENTIN 100 MG/1
100 CAPSULE ORAL NIGHTLY
Status: DISCONTINUED | OUTPATIENT
Start: 2024-05-13 | End: 2024-05-16 | Stop reason: HOSPADM

## 2024-05-13 RX ORDER — MAGNESIUM SULFATE 1 G/100ML
1000 INJECTION INTRAVENOUS ONCE
Status: COMPLETED | OUTPATIENT
Start: 2024-05-13 | End: 2024-05-13

## 2024-05-13 RX ORDER — FUROSEMIDE 10 MG/ML
20 INJECTION INTRAMUSCULAR; INTRAVENOUS ONCE
Status: COMPLETED | OUTPATIENT
Start: 2024-05-13 | End: 2024-05-13

## 2024-05-13 RX ADMIN — PANTOPRAZOLE SODIUM 40 MG: 40 TABLET, DELAYED RELEASE ORAL at 05:30

## 2024-05-13 RX ADMIN — APIXABAN 2.5 MG: 2.5 TABLET, FILM COATED ORAL at 10:56

## 2024-05-13 RX ADMIN — MAGNESIUM SULFATE IN DEXTROSE 1000 MG: 10 INJECTION, SOLUTION INTRAVENOUS at 08:19

## 2024-05-13 RX ADMIN — FUROSEMIDE 20 MG: 10 INJECTION, SOLUTION INTRAMUSCULAR; INTRAVENOUS at 10:57

## 2024-05-13 RX ADMIN — HEPARIN SODIUM 5000 UNITS: 5000 INJECTION INTRAVENOUS; SUBCUTANEOUS at 05:30

## 2024-05-13 RX ADMIN — GABAPENTIN 100 MG: 100 CAPSULE ORAL at 21:29

## 2024-05-13 RX ADMIN — SODIUM CHLORIDE: 9 INJECTION, SOLUTION INTRAVENOUS at 01:01

## 2024-05-13 RX ADMIN — Medication 10 ML: at 08:12

## 2024-05-13 ASSESSMENT — PAIN SCALES - GENERAL
PAINLEVEL_OUTOF10: 0

## 2024-05-13 NOTE — PROGRESS NOTES
Determinants of Health     Financial Resource Strain: Not on file   Food Insecurity: No Food Insecurity (5/8/2024)    Hunger Vital Sign     Worried About Running Out of Food in the Last Year: Never true     Ran Out of Food in the Last Year: Never true   Transportation Needs: No Transportation Needs (5/8/2024)    PRAPARE - Transportation     Lack of Transportation (Medical): No     Lack of Transportation (Non-Medical): No   Physical Activity: Not on file   Stress: Not on file   Social Connections: Not on file   Intimate Partner Violence: Not on file   Housing Stability: Low Risk  (5/8/2024)    Housing Stability Vital Sign     Unable to Pay for Housing in the Last Year: No     Number of Places Lived in the Last Year: 1     Unstable Housing in the Last Year: No       Current Medications:     Current Facility-Administered Medications:     calcium carbonate (TUMS) chewable tablet 500 mg, 500 mg, Oral, TID PRN, José Luis Sin MD, 500 mg at 05/12/24 1535    pantoprazole (PROTONIX) tablet 40 mg, 40 mg, Oral, QAM AC, Heaven Berg MD, 40 mg at 05/13/24 0530    DOPamine (INTROPIN) 800 mg in dextrose 5 % 250 mL infusion, 1 mcg/kg/min, IntraVENous, Continuous, Heaven Berg MD, Last Rate: 1.1 mL/hr at 05/13/24 0936, 1 mcg/kg/min at 05/13/24 0936    0.9 % sodium chloride infusion, , IntraVENous, Continuous, Heaven Berg MD, Last Rate: 50 mL/hr at 05/13/24 0936, Rate Verify at 05/13/24 0936    [Held by provider] apixaban (ELIQUIS) tablet 2.5 mg, 2.5 mg, Oral, BID, Pedro French MD, 2.5 mg at 05/10/24 1039    heparin (porcine) injection 5,000 Units, 5,000 Units, SubCUTAneous, 3 times per day, José Luis Sin MD, 5,000 Units at 05/13/24 0530    polyethylene glycol (GLYCOLAX) packet 17 g, 17 g, Oral, Daily, Marlon Mathew APRN - CNP, 17 g at 05/11/24 1827    docusate sodium (COLACE) capsule 100 mg, 100 mg, Oral, BID, Marlon Mathew, [place] - CNP, 100 mg at 05/12/24  Signs:  BP (!) 134/57   Pulse 63   Temp 97.8 °F (36.6 °C) (Oral)   Resp 12   Ht 1.575 m (5' 2.01\")   Wt 62.8 kg (138 lb 8 oz)   SpO2 90%   BMI 25.33 kg/m²     Input/Output:  In: 2291.5 [P.O.:720; I.V.:1571.5]  Out: -     Oxygen requirements: RA during the and 2 L nasal cannula at night       General appearance: Bradycardic, elderly frail woman, not in pain or distress, in no respiratory distress    HEENT: Atraumatic/normocephalic, EOMI, macular degeneration right eye, glaucoma left eye with some redness and purulent drainage noted, pharynx clear, moist mucosa, redness of the uvula appreciated,   Neck: Supple, trachea midline, no jugular venous distension, thyromegaly or carotid bruits  Chest: Equal normal breath sounds, no wheezing, no crackles and no tenderness over ribs   Cardiovascular: Normal S1 , S2, regular rate and rhythm, no murmur, rub or gallop  Abdomen: Normal sounds present, soft, lax with no tenderness, no hepatosplenomegaly, and no masses  Extremities: No edema. Pulses are equally present.   Skin: Some small skin tears noted as well as ecchymosis, no rashes on visualized skin  Neurologic: Alert and oriented x 3, No focal deficit     Investigations:  Labs, radiological imaging and cardiac work up were personally reviewed        ICU STAFF PHYSICIAN NOTE OF PERSONAL INVOLVEMENT IN CARE    As the attending physician, I certify that I personally reviewed the patient’s history and personnally examined the patient to confirm the physical findings described above,  And that I reviewed the relevant imaging studies and available reports.  I also discussed the differential diagnosis and all of the proposed management plans with the patient and individuals accompanying the patient to this visit.  They had the opportunity to ask questions about the proposed management plans and to have those questions answered.     This patient has a high probability of sudden, clinically significant deterioration, which

## 2024-05-13 NOTE — PLAN OF CARE
Problem: Discharge Planning  Goal: Discharge to home or other facility with appropriate resources  5/13/2024 0932 by Renard Mon RN  Outcome: Progressing  Flowsheets (Taken 5/13/2024 0800)  Discharge to home or other facility with appropriate resources: Identify barriers to discharge with patient and caregiver     Problem: Skin/Tissue Integrity  Goal: Absence of new skin breakdown  Description: 1.  Monitor for areas of redness and/or skin breakdown  2.  Assess vascular access sites hourly  3.  Every 4-6 hours minimum:  Change oxygen saturation probe site  4.  Every 4-6 hours:  If on nasal continuous positive airway pressure, respiratory therapy assess nares and determine need for appliance change or resting period.  5/13/2024 0932 by Renard Mon RN  Outcome: Progressing     Problem: Safety - Adult  Goal: Free from fall injury  5/13/2024 0932 by Renard Mon RN  Outcome: Progressing     Problem: ABCDS Injury Assessment  Goal: Absence of physical injury  5/13/2024 0932 by Renard Mon RN  Outcome: Progressing     Problem: Pain  Goal: Verbalizes/displays adequate comfort level or baseline comfort level  5/13/2024 0932 by Renard Mon RN  Outcome: Progressing  Flowsheets (Taken 5/13/2024 0400 by Monae Levi, RN)  Verbalizes/displays adequate comfort level or baseline comfort level: Implement non-pharmacological measures as appropriate and evaluate response     Problem: Chronic Conditions and Co-morbidities  Goal: Patient's chronic conditions and co-morbidity symptoms are monitored and maintained or improved  5/13/2024 0932 by Renard Mon RN  Outcome: Progressing  Flowsheets (Taken 5/13/2024 0800)  Care Plan - Patient's Chronic Conditions and Co-Morbidity Symptoms are Monitored and Maintained or Improved: Monitor and assess patient's chronic conditions and comorbid symptoms for stability, deterioration, or improvement     Problem: Nutrition Deficit:  Goal: Optimize nutritional status  5/13/2024 0932

## 2024-05-13 NOTE — PROGRESS NOTES
with manual disimpaction per RN on 5/11. Continue bowel regimen of Colace and Miralax.    # Disposition  -Continue to monitor response to the above treatments but anticipate will require least 24 hours for medical optimization to ensure safe discharge.    Case discussed with ICU RN at bedside.    NOTE: This report was transcribed using voice recognition software. Every effort was made to ensure accuracy; however, inadvertent computerized transcription errors may be present.     Electronically signed by José Luis Sin MD on 5/13/2024 at 4:45 PM

## 2024-05-13 NOTE — PROGRESS NOTES
Physical Therapy Treatment Note/Plan of Care    Room #:  IC04/IC04-01  Patient Name: Mary Khan  YOB: 1928  MRN: [id number]    Date of Service: 5/13/2024     Tentative placement recommendation: Subacute unless patient meets goals then Home Health Physical Therapy with 24/7 assist/supervision  Equipment recommendation: To be determined      Evaluating Physical Therapist: Ion Mariscal, PT #[id number]      Specific Provider Orders/Date/Referring Provider :   05/09/24 1100    PT eval and treat  Start:  05/09/24 1100,   End:  05/09/24 1100,   ONE TIME,   Standing Count:  1 Occurrences,   R       Comments:  Please only as tolerated  Pedro French MD    Admitting Diagnosis:   Bradycardia [R00.1]  Symptomatic bradycardia [R00.1]      Surgery: none        Patient Active Problem List   Diagnosis    Breast cancer (HCC)    Hypertension    Malignant neoplasm of lower-outer quadrant of left female breast (HCC)    Cellulitis of lower extremity    Hyperlipidemia    Chronic back pain    Non-healing ulcer (HCC)    CKD (chronic kidney disease) stage 4, GFR 15-29 ml/min (HCC)    Chronic diastolic heart failure (HCC)    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 Pt pleasantly confused at times. Visual deficits, cues needed for safety and environmental negotiation. Shortness of breath noted, rest breaks needed. The patient will benefit from continued skilled therapy to increase strength and improve balance for safe functional mobility, to

## 2024-05-13 NOTE — PLAN OF CARE
Problem: Discharge Planning  Goal: Discharge to home or other facility with appropriate resources  5/13/2024 0316 by Monae Levi RN  Outcome: Progressing  5/12/2024 1443 by Gisela Gutierrez RN  Outcome: Progressing     Problem: Skin/Tissue Integrity  Goal: Absence of new skin breakdown  Description: 1.  Monitor for areas of redness and/or skin breakdown  2.  Assess vascular access sites hourly  3.  Every 4-6 hours minimum:  Change oxygen saturation probe site  4.  Every 4-6 hours:  If on nasal continuous positive airway pressure, respiratory therapy assess nares and determine need for appliance change or resting period.  5/13/2024 0316 by Monae Levi RN  Outcome: Progressing  5/12/2024 1443 by Gisela Gutierrez RN  Outcome: Progressing     Problem: Safety - Adult  Goal: Free from fall injury  5/13/2024 0316 by Monae Levi RN  Outcome: Progressing  Flowsheets (Taken 5/13/2024 0310)  Free From Fall Injury: Based on caregiver fall risk screen, instruct family/caregiver to ask for assistance with transferring infant if caregiver noted to have fall risk factors  5/12/2024 1443 by Gisela Gutierrez RN  Outcome: Progressing     Problem: ABCDS Injury Assessment  Goal: Absence of physical injury  5/13/2024 0316 by Monae Levi RN  Outcome: Progressing  Flowsheets (Taken 5/13/2024 0310)  Absence of Physical Injury: Implement safety measures based on patient assessment  5/12/2024 1443 by Gisela Gutierrez RN  Outcome: Progressing     Problem: Pain  Goal: Verbalizes/displays adequate comfort level or baseline comfort level  5/13/2024 0316 by Monae Levi RN  Outcome: Progressing  Flowsheets (Taken 5/12/2024 2000)  Verbalizes/displays adequate comfort level or baseline comfort level:   Assess pain using appropriate pain scale   Implement non-pharmacological measures as appropriate and evaluate response  5/12/2024 1443 by Gisela Gutierrez RN  Outcome: Progressing     Problem: Chronic

## 2024-05-13 NOTE — ACP (ADVANCE CARE PLANNING)
5/13/2024  ICU, Dopamine gtt, 3lnc, Lasix IVm Electrolyte replacement. CM spoke to pts son Georgi. Georgi requested SNF at discharge. Josi with [facility] following. NO PRECERT, Needs signed [facility] and [facility] at discharge. Pt may have exhausted her Medicare SNF days, but they private paid last admission. This is being reviewed by SNF. [place] with [facility] is following, if home need NAYELY at discharge. Cm following. Electronically signed by Margret Small RN-BC on 5/13/2024 at 11:26 AM        The Plan for Transition of Care is related to the following treatment goals: SNF versus [facility]    The Patient and/or patient representative son Georgi  was provided with a choice of provider and agrees   with the discharge plan. [x] Yes [] No    Freedom of choice list was provided with basic dialogue that supports the patient's individualized plan of care/goals, treatment preferences and shares the quality data associated with the providers. [x] Yes [] No

## 2024-05-13 NOTE — PROGRESS NOTES
05/13/24 1310   Encounter Summary   Encounter Overview/Reason Spiritual/Emotional Needs   Service Provided For Patient   Referral/Consult From [facility]   Last Encounter  05/13/24  (L-HD)   Complexity of Encounter High   Spiritual/Emotional needs   Type Spiritual Support   Assessment/Intervention/Outcome   Assessment Calm;Hopeful   Intervention Active listening;Discussed illness injury and it’s impact;Explored/Affirmed feelings, thoughts, concerns;Explored Coping Skills/Resources;Nurtured Hope;Prayer (assurance of)/[place];Sustaining Presence/Ministry of presence;Read/Provided Scripture   Outcome Acceptance;Comfort;Engaged in conversation;Expressed feelings, needs, and concerns;Expressed Gratitude;Receptive      visited with patient who was sitting up in bed. Patient stated she was hopeful she could go home instead of another facility.  offered a listening presence, empathy, and prayer.  Spiritual care is ongoing and as need.    margaret [facility]

## 2024-05-13 NOTE — CARE COORDINATION
Contacted Dr. Lopez regarding ongoing treatment for bradycardia in regards to dopamine drip and anticoagulation.    He had intended for the patient to be off dopamine drip 48 hours after the meds were on hold to see if she had an indication for pacemaker.  This low level dopamine might be masking underlying sick sinus syndrome so advise for this to to be discontinued.    In addition, he requested that Eliquis be placed on hold.  No need for heparin drip at this point but we can resume Eliquis tomorrow if no indication for pacemaker.    If the patient does have recurrent bradycardia <50 or sinus pauses they are to notify Dr. Lopez directly for further instructions.  Nursing order placed regarding this issue.      Case discussed with patient's current ICU nurse over the phone.

## 2024-05-14 LAB
ALBUMIN SERPL-MCNC: 2.9 G/DL (ref 3.5–5.2)
ALP SERPL-CCNC: 85 U/L (ref 35–104)
ALT SERPL-CCNC: 16 U/L (ref 0–32)
ANION GAP SERPL CALCULATED.3IONS-SCNC: 11 MMOL/L (ref 7–16)
AST SERPL-CCNC: 32 U/L (ref 0–31)
BASOPHILS # BLD: 0.04 K/UL (ref 0–0.2)
BASOPHILS NFR BLD: 1 % (ref 0–2)
BILIRUB SERPL-MCNC: 0.4 MG/DL (ref 0–1.2)
BUN SERPL-MCNC: 32 MG/DL (ref 6–23)
CALCIUM SERPL-MCNC: 8.9 MG/DL (ref 8.6–10.2)
CHLORIDE SERPL-SCNC: 109 MMOL/L (ref 98–107)
CO2 SERPL-SCNC: 21 MMOL/L (ref 22–29)
CREAT SERPL-MCNC: 1.7 MG/DL (ref 0.5–1)
EOSINOPHIL # BLD: 0.05 K/UL (ref 0.05–0.5)
EOSINOPHILS RELATIVE PERCENT: 2 % (ref 0–6)
ERYTHROCYTE [DISTWIDTH] IN BLOOD BY AUTOMATED COUNT: 14.9 % (ref 11.5–15)
GFR, ESTIMATED: 28 ML/MIN/1.73M2
GLUCOSE SERPL-MCNC: 76 MG/DL (ref 74–99)
HCT VFR BLD AUTO: 27.5 % (ref 34–48)
HGB BLD-MCNC: 9.2 G/DL (ref 11.5–15.5)
IMM GRANULOCYTES # BLD AUTO: <0.03 K/UL (ref 0–0.58)
IMM GRANULOCYTES NFR BLD: 0 % (ref 0–5)
LYMPHOCYTES NFR BLD: 0.85 K/UL (ref 1.5–4)
LYMPHOCYTES RELATIVE PERCENT: 30 % (ref 20–42)
MAGNESIUM SERPL-MCNC: 2 MG/DL (ref 1.6–2.6)
MCH RBC QN AUTO: 33 PG (ref 26–35)
MCHC RBC AUTO-ENTMCNC: 33.5 G/DL (ref 32–34.5)
MCV RBC AUTO: 98.6 FL (ref 80–99.9)
MONOCYTES NFR BLD: 0.48 K/UL (ref 0.1–0.95)
MONOCYTES NFR BLD: 17 % (ref 2–12)
NEUTROPHILS NFR BLD: 50 % (ref 43–80)
NEUTS SEG NFR BLD: 1.41 K/UL (ref 1.8–7.3)
PHOSPHATE SERPL-MCNC: 3.2 MG/DL (ref 2.5–4.5)
PLATELET # BLD AUTO: 139 K/UL (ref 130–450)
PMV BLD AUTO: 10.2 FL (ref 7–12)
POTASSIUM SERPL-SCNC: 4.2 MMOL/L (ref 3.5–5)
PROT SERPL-MCNC: 5.2 G/DL (ref 6.4–8.3)
RBC # BLD AUTO: 2.79 M/UL (ref 3.5–5.5)
SODIUM SERPL-SCNC: 141 MMOL/L (ref 132–146)
WBC OTHER # BLD: 2.8 K/UL (ref 4.5–11.5)

## 2024-05-14 PROCEDURE — 85025 COMPLETE CBC W/AUTO DIFF WBC: CPT

## 2024-05-14 PROCEDURE — 84100 ASSAY OF PHOSPHORUS: CPT

## 2024-05-14 PROCEDURE — 6360000002 HC RX W HCPCS: Performed by: INTERNAL MEDICINE

## 2024-05-14 PROCEDURE — 2000000000 HC ICU R&B

## 2024-05-14 PROCEDURE — 2580000003 HC RX 258: Performed by: INTERNAL MEDICINE

## 2024-05-14 PROCEDURE — 99232 SBSQ HOSP IP/OBS MODERATE 35: CPT | Performed by: INTERNAL MEDICINE

## 2024-05-14 PROCEDURE — 99291 CRITICAL CARE FIRST HOUR: CPT | Performed by: INTERNAL MEDICINE

## 2024-05-14 PROCEDURE — 6370000000 HC RX 637 (ALT 250 FOR IP)

## 2024-05-14 PROCEDURE — 83735 ASSAY OF MAGNESIUM: CPT

## 2024-05-14 PROCEDURE — 80053 COMPREHEN METABOLIC PANEL: CPT

## 2024-05-14 PROCEDURE — 6370000000 HC RX 637 (ALT 250 FOR IP): Performed by: INTERNAL MEDICINE

## 2024-05-14 RX ORDER — HEPARIN SODIUM 5000 [USP'U]/ML
5000 INJECTION, SOLUTION INTRAVENOUS; SUBCUTANEOUS EVERY 8 HOURS SCHEDULED
Status: DISCONTINUED | OUTPATIENT
Start: 2024-05-14 | End: 2024-05-15

## 2024-05-14 RX ADMIN — GABAPENTIN 100 MG: 100 CAPSULE ORAL at 21:49

## 2024-05-14 RX ADMIN — PANTOPRAZOLE SODIUM 40 MG: 40 TABLET, DELAYED RELEASE ORAL at 07:16

## 2024-05-14 RX ADMIN — Medication 10 ML: at 21:51

## 2024-05-14 RX ADMIN — HEPARIN SODIUM 5000 UNITS: 5000 INJECTION INTRAVENOUS; SUBCUTANEOUS at 21:49

## 2024-05-14 RX ADMIN — Medication 10 ML: at 08:15

## 2024-05-14 RX ADMIN — HEPARIN SODIUM 5000 UNITS: 5000 INJECTION INTRAVENOUS; SUBCUTANEOUS at 15:25

## 2024-05-14 ASSESSMENT — PAIN SCALES - GENERAL: PAINLEVEL_OUTOF10: 0

## 2024-05-14 NOTE — CARE COORDINATION
5/14/2024 ICU, discontinuing dopamine gtt. Plans for transfer out of ICU later today. Room air. Pt alert and oriented. CM spoke to Georgi, pts son, yesterday and confirmed discharge plans. Referral placed to [facility]. Shahla with [facility] [address] following. NO PRECERT, Needs signed DEMETRIA and HENS at discharge. Pt has some Medicare SNF days left. Liaison will inform son Georgi. Tyaskin with [facility] is following, if pt goes home need NAYELY at discharge. CM and Pastoral care following. Electronically signed by Margret Small RN-BC on 5/14/2024 at 10:37 AM

## 2024-05-14 NOTE — PROGRESS NOTES
Date:  5/14/2024  Patient: Mary Khan  Admission:  5/8/2024  2:36 PM  Admit DX: Bradycardia [R00.1]  Symptomatic bradycardia [R00.1]  Age:  89 y.o., 9/3/1928     LOS: 6 days       SUBJECTIVE:      The patient is seen and examined  She is sitting up in a chair  Denies cardiac symptoms  She has significant hearing loss  Blood pressure is high  She was started back on dopamine before the weekend  No further bradycardia  Creatinine is at baseline (she has chronic renal insufficiency with baseline creatinine of 1.5-1.7)        OBJECTIVE:    BP (!) 163/66   Pulse 58   Temp 98.4 °F (36.9 °C) (Oral)   Resp 20   Ht 1.575 m (5' 2.01\")   Wt 62.8 kg (138 lb 8 oz)   SpO2 93%   BMI 25.33 kg/m²     Intake/Output Summary (Last 24 hours) at 5/14/2024 0301  Last data filed at 5/13/2024 1843  Gross per 24 hour   Intake 1714.01 ml   Output --   Net 1714.01 ml       EXAM:     Head: Normocephalic, Normal hearing, Oral mucosa is moist.  Neck: Supple, No carotid bruit, No jugular venous distention, No lymphadenopathy.  Respiratory: Lungs are clear to auscultation, Respirations are non-labored, Breath sounds are equal, Symmetrical chest wall expansion.  Cardiovascular: Heart rate in the 60s now, No gallop, Good pulses equal in all extremities, No edema.  Gastrointestinal: Soft, Non-tender, Non-distended, Normal bowel sounds.  Musculoskeletal: Normal strength, No tenderness, No deformity.  Integumentary:  Warm, Dry.    Neurologic: Alert, Oriented, No focal deficits.  Cognition and Speech: Oriented, Speech clear and coherent.  Psychiatric: Cooperative, Appropriate mood & affect, Normal judgment.    Current Inpatient Medications:   gabapentin  100 mg Oral Nightly    pantoprazole  40 mg Oral QAM AC    [Held by provider] apixaban  2.5 mg Oral BID    polyethylene glycol  17 g Oral Daily    docusate sodium  100 mg Oral BID    sodium chloride flush  5-40 mL IntraVENous 2 times per day    [Held by provider] bumetanide  1 mg Oral

## 2024-05-14 NOTE — PROGRESS NOTES
Assessment and Plan  Patient is a 89 y.o. female with the following medical Problems:     Symptomatic bradycardia  Paroxysmal atrial fibrillation  Conjunctivitis with purulent drainage  Acute kidney injury  Hyperkalemia  Heart failure with preserved ejection fraction  Hiatal hernia    Most recent hemodynamics reviewed, continue to monitor  Patient has been intermittently off dopamine.  Beta-blockers and amiodarone Home medications held  Heparin subcu for DVT prophylaxis while Eliquis is on hold.  CBC, CMP, mag, Phos, ionized calcium ordered  Monitor electrolytes and replace as necessary  Echocardiogram from 5/17/2022 showed EF equals 77%.  E/a flow reversal noted.  Suggestive of diastolic dysfunction.  Left atrium mildly dilated.  Borderline dilated right ventricle.  Mildly enlarged right atrium size.  Mild to moderate mitral regurgitation is present.  Mild tricuspid regurgitation.  Mild pulmonary hypertension.  Echocardiogram with normal EF.  EKG ordered as needed  Tobramycin eyedrops ordered  Oxygen therapy protocol ordered  Accurate I's and O's ordered every 8 hours  CT head without contrast showed no acute intercranial abnormality.  Chest x-ray showed new mild left basilar atelectasis.  Large hiatal hernia.  Otherwise no acute active disease seen in the chest.  Creatinine is improving.  Dopamine has been placed on hold.  Creatinine has slightly increased. Patient remains bradycardic  Judicious diuresis with close monitoring of kidney function.  DVT prophylaxis: Eliquis was resumed today.  PPI prophylaxis Protonix  CODE STATUS: Per chart review patient stated she had a living will and she is DNR.  For the purpose of treatment she has elected to change that to DNR CCA      History of Present Illness: Shanae Khan is 89-year-old  female with past medical history significant for lower extremity cellulitis, HLD, chronic back pain, right leg nonhealing ulcer, CKD, CHF, HTN, breast CA stage Ia, and  sodium chloride flush 0.9 % injection 5-40 mL, 5-40 mL, IntraVENous, 2 times per day, Fredrick Sevilla MD, 10 mL at 05/14/24 0815    sodium chloride flush 0.9 % injection 5-40 mL, 5-40 mL, IntraVENous, PRN, Fredrick Sevilla MD    0.9 % sodium chloride infusion, , IntraVENous, PRN, Fredrick Sevilla MD    ondansetron (ZOFRAN-ODT) disintegrating tablet 4 mg, 4 mg, Oral, Q8H PRN, 4 mg at 05/12/24 1638 **OR** ondansetron (ZOFRAN) injection 4 mg, 4 mg, IntraVENous, Q6H PRN, Fredrick Sevilla MD, 4 mg at 05/08/24 2228    polyethylene glycol (GLYCOLAX) packet 17 g, 17 g, Oral, Daily PRN, Fredrick Sevilla MD, 17 g at 05/10/24 2229    acetaminophen (TYLENOL) tablet 650 mg, 650 mg, Oral, Q6H PRN, 650 mg at 05/12/24 1105 **OR** acetaminophen (TYLENOL) suppository 650 mg, 650 mg, Rectal, Q6H PRN, Fredrick Sevilla MD    [Held by provider] bumetanide (BUMEX) tablet 1 mg, 1 mg, Oral, Daily, Fredrick Sevilla MD    Review of Systems:   General: denies weight gain, denies loss of appetite, fever, chills, night sweats.  HEENT: denies headaches, dizziness, head trauma, visual changes, eye pain, tinnitus, nosebleeds, hoarseness or throat pain    Respiratory: denies chest pain, dyspnea, cough and hemoptysis  Cardiovascular: denies orthopnea, paroxysmal nocturnal dyspnea, leg swelling, and previous heart attack.    Gastrointestinal: denies pain, nausea vomiting, diarrhea, constipation, melena or bleeding.  Genitourinary: denies hematuria, frequency, urgency or dysuria  Neurology: denies syncope, seizures, paralysis, paraesthesia   Endocrine: denies polyuria, polydipsia, skin or hair changes, and heat or cold intolerance  Musculoskeletal: denies joint pain, swelling, arthritis or myalgia  Hematologic: denies bleeding, adenopathy and easy bruising  Skin: denies rashes and skin discoloration  Psychiatry: denies depression    Physical Exam:   Vital Signs:  BP (!) 152/55   Pulse 51   Temp 98.2 °F (36.8 °C) (Oral)   Resp 18   Ht 1.575 m (5' 2.01\")   Wt

## 2024-05-14 NOTE — PROGRESS NOTES
[facility]   [facility]    Admitting Date and Time: 5/8/2024  2:36 PM  Admit Dx: Bradycardia [R00.1]  Symptomatic bradycardia [R00.1]    Subjective:    5/10: Pt feels tired and somewhat woozy. Complains BP cuff on left wrist is very tight.  Per RN, Dopamine drip was at 15 mcg/kg/min but now down to 7.5 mcg/kg/min.    5/11: Pt resting comfortably in bed. She states she is tired.  Per RN Dopamine drip on intermittently. She was off for several hours last evening and then again early this morning. She is now back at 1 mcg/kg/min. Patient was complaining of constipation and needed to be manually disimpacted.    5/12: Pt was asking for something \"peppermint\" for her stomach. When I asked her to clarify she stated and said Tums.  Per RN, remains on Dopamine 1 mcg/kg/min to help with kidney perfusion per intensivist.    5/13: Pt sitting up in chair. Complains of BP cuff on wrist is too tight.  Per RN, remains on Dopamine 1 mcg/kg/min to help with kidney perfusion per intensivist.    5/14: Pt sitting up in bed. She is in good spirits.    Per RN: Dopamine has been off as per my orders since yesterday evening. HR has been 50's-60's       gabapentin  100 mg Oral Nightly    pantoprazole  40 mg Oral QAM AC    [Held by provider] apixaban  2.5 mg Oral BID    polyethylene glycol  17 g Oral Daily    docusate sodium  100 mg Oral BID    sodium chloride flush  5-40 mL IntraVENous 2 times per day    [Held by provider] bumetanide  1 mg Oral Daily     calcium carbonate, 500 mg, TID PRN  sodium chloride flush, 5-40 mL, PRN  sodium chloride, , PRN  ondansetron, 4 mg, Q8H PRN   Or  ondansetron, 4 mg, Q6H PRN  polyethylene glycol, 17 g, Daily PRN  acetaminophen, 650 mg, Q6H PRN   Or  acetaminophen, 650 mg, Q6H PRN         Objective:    BP (!) 152/55   Pulse 51   Temp 98.2 °F (36.8 °C) (Oral)   Resp 18   Ht 1.575 m (5' 2.01\")   Wt 62.1 kg (136 lb 12.8 oz)   SpO2 95%   BMI 25.01 kg/m²   General

## 2024-05-15 ENCOUNTER — APPOINTMENT (OUTPATIENT)
Dept: GENERAL RADIOLOGY | Age: 89
DRG: 309 | End: 2024-05-15
Payer: MEDICARE

## 2024-05-15 LAB
ALBUMIN SERPL-MCNC: 3.3 G/DL (ref 3.5–5.2)
ALP SERPL-CCNC: 98 U/L (ref 35–104)
ALT SERPL-CCNC: 21 U/L (ref 0–32)
ANION GAP SERPL CALCULATED.3IONS-SCNC: 13 MMOL/L (ref 7–16)
AST SERPL-CCNC: 35 U/L (ref 0–31)
BASOPHILS # BLD: 0.03 K/UL (ref 0–0.2)
BASOPHILS NFR BLD: 1 % (ref 0–2)
BILIRUB SERPL-MCNC: 0.5 MG/DL (ref 0–1.2)
BUN SERPL-MCNC: 33 MG/DL (ref 6–23)
CALCIUM SERPL-MCNC: 9 MG/DL (ref 8.6–10.2)
CHLORIDE SERPL-SCNC: 105 MMOL/L (ref 98–107)
CO2 SERPL-SCNC: 20 MMOL/L (ref 22–29)
CREAT SERPL-MCNC: 1.5 MG/DL (ref 0.5–1)
EOSINOPHIL # BLD: 0.08 K/UL (ref 0.05–0.5)
EOSINOPHILS RELATIVE PERCENT: 3 % (ref 0–6)
ERYTHROCYTE [DISTWIDTH] IN BLOOD BY AUTOMATED COUNT: 15.2 % (ref 11.5–15)
GFR, ESTIMATED: 31 ML/MIN/1.73M2
GLUCOSE SERPL-MCNC: 81 MG/DL (ref 74–99)
HCT VFR BLD AUTO: 31 % (ref 34–48)
HGB BLD-MCNC: 10.2 G/DL (ref 11.5–15.5)
IMM GRANULOCYTES # BLD AUTO: <0.03 K/UL (ref 0–0.58)
IMM GRANULOCYTES NFR BLD: 0 % (ref 0–5)
LYMPHOCYTES NFR BLD: 0.95 K/UL (ref 1.5–4)
LYMPHOCYTES RELATIVE PERCENT: 30 % (ref 20–42)
MAGNESIUM SERPL-MCNC: 1.8 MG/DL (ref 1.6–2.6)
MCH RBC QN AUTO: 32.2 PG (ref 26–35)
MCHC RBC AUTO-ENTMCNC: 32.9 G/DL (ref 32–34.5)
MCV RBC AUTO: 97.8 FL (ref 80–99.9)
MONOCYTES NFR BLD: 0.56 K/UL (ref 0.1–0.95)
MONOCYTES NFR BLD: 17 % (ref 2–12)
NEUTROPHILS NFR BLD: 49 % (ref 43–80)
NEUTS SEG NFR BLD: 1.59 K/UL (ref 1.8–7.3)
PHOSPHATE SERPL-MCNC: 3.2 MG/DL (ref 2.5–4.5)
PLATELET, FLUORESCENCE: 145 K/UL (ref 130–450)
PMV BLD AUTO: 10.2 FL (ref 7–12)
POTASSIUM SERPL-SCNC: 4.1 MMOL/L (ref 3.5–5)
PROT SERPL-MCNC: 5.9 G/DL (ref 6.4–8.3)
RBC # BLD AUTO: 3.17 M/UL (ref 3.5–5.5)
SODIUM SERPL-SCNC: 138 MMOL/L (ref 132–146)
WBC OTHER # BLD: 3.2 K/UL (ref 4.5–11.5)

## 2024-05-15 PROCEDURE — 83735 ASSAY OF MAGNESIUM: CPT

## 2024-05-15 PROCEDURE — 84100 ASSAY OF PHOSPHORUS: CPT

## 2024-05-15 PROCEDURE — 6370000000 HC RX 637 (ALT 250 FOR IP): Performed by: INTERNAL MEDICINE

## 2024-05-15 PROCEDURE — 2580000003 HC RX 258: Performed by: INTERNAL MEDICINE

## 2024-05-15 PROCEDURE — 80053 COMPREHEN METABOLIC PANEL: CPT

## 2024-05-15 PROCEDURE — 97535 SELF CARE MNGMENT TRAINING: CPT

## 2024-05-15 PROCEDURE — 85025 COMPLETE CBC W/AUTO DIFF WBC: CPT

## 2024-05-15 PROCEDURE — 99232 SBSQ HOSP IP/OBS MODERATE 35: CPT | Performed by: INTERNAL MEDICINE

## 2024-05-15 PROCEDURE — 6360000002 HC RX W HCPCS

## 2024-05-15 PROCEDURE — 2000000000 HC ICU R&B

## 2024-05-15 PROCEDURE — 97530 THERAPEUTIC ACTIVITIES: CPT

## 2024-05-15 PROCEDURE — 99233 SBSQ HOSP IP/OBS HIGH 50: CPT | Performed by: INTERNAL MEDICINE

## 2024-05-15 PROCEDURE — 6360000002 HC RX W HCPCS: Performed by: INTERNAL MEDICINE

## 2024-05-15 PROCEDURE — 97110 THERAPEUTIC EXERCISES: CPT

## 2024-05-15 RX ORDER — MAGNESIUM SULFATE IN WATER 40 MG/ML
2000 INJECTION, SOLUTION INTRAVENOUS ONCE
Status: COMPLETED | OUTPATIENT
Start: 2024-05-15 | End: 2024-05-15

## 2024-05-15 RX ADMIN — ACETAMINOPHEN 650 MG: 325 TABLET ORAL at 22:54

## 2024-05-15 RX ADMIN — HEPARIN SODIUM 5000 UNITS: 5000 INJECTION INTRAVENOUS; SUBCUTANEOUS at 14:42

## 2024-05-15 RX ADMIN — Medication 10 ML: at 09:03

## 2024-05-15 RX ADMIN — HEPARIN SODIUM 5000 UNITS: 5000 INJECTION INTRAVENOUS; SUBCUTANEOUS at 06:29

## 2024-05-15 RX ADMIN — Medication 10 ML: at 20:33

## 2024-05-15 RX ADMIN — PANTOPRAZOLE SODIUM 40 MG: 40 TABLET, DELAYED RELEASE ORAL at 06:29

## 2024-05-15 RX ADMIN — GABAPENTIN 100 MG: 100 CAPSULE ORAL at 20:30

## 2024-05-15 RX ADMIN — MAGNESIUM SULFATE HEPTAHYDRATE 2000 MG: 40 INJECTION, SOLUTION INTRAVENOUS at 06:30

## 2024-05-15 RX ADMIN — APIXABAN 2.5 MG: 2.5 TABLET, FILM COATED ORAL at 20:30

## 2024-05-15 NOTE — PLAN OF CARE
Problem: Skin/Tissue Integrity  Goal: Absence of new skin breakdown  Description: 1.  Monitor for areas of redness and/or skin breakdown  2.  Assess vascular access sites hourly  3.  Every 4-6 hours minimum:  Change oxygen saturation probe site  4.  Every 4-6 hours:  If on nasal continuous positive airway pressure, respiratory therapy assess nares and determine need for appliance change or resting period.  Outcome: Progressing     Problem: Safety - Adult  Goal: Free from fall injury  Outcome: Progressing     Problem: ABCDS Injury Assessment  Goal: Absence of physical injury  Outcome: Progressing     Problem: Pain  Goal: Verbalizes/displays adequate comfort level or baseline comfort level  Outcome: Progressing     Problem: Chronic Conditions and Co-morbidities  Goal: Patient's chronic conditions and co-morbidity symptoms are monitored and maintained or improved  Outcome: Progressing     Problem: Nutrition Deficit:  Goal: Optimize nutritional status  Outcome: Progressing

## 2024-05-15 NOTE — PROGRESS NOTES
OCCUPATIONAL THERAPY TREATMENT NOTE  [facility]  [address] [facility]         Date:5/15/2024                                                   Patient Name: Mary Khan     MRN: [id number]     : 9/3/1928     Room: [place] [address]       Evaluating OT: Hilda Panchal, OTR/L; [id number]       Referring Provider and Orders/Date:    OT eval and treat  Start:  24 1100,   End:  24 1100,   ONE TIME,   Standing Count:  1 Occurrences,   R       Comments:  Please only as tolerated  Pedro French MD    Recommended placement: subacute due to inability to stand at sink for ADLs and limitations with fall risk       Diagnosis:   1. Symptomatic bradycardia    2. Chronic diastolic congestive heart failure (HCC)         Surgery: none      Pertinent Medical History:        Past Medical History:   Diagnosis Date    Breast cancer (HCC)     Cancer (HCC)     melonoma - back; left breast CA    Chronic back pain     CKD (chronic kidney disease) stage 3, GFR 30-59 ml/min (HCC)     Hyperlipidemia     Hypertension     Macular degeneration     Osteoarthritis     Paroxysmal A-fib (HCC) 5/10/2024    Restless legs syndrome           Past Surgical History:   Procedure Laterality Date    BREAST LUMPECTOMY      BREAST SURGERY Left 6/3/15    lumpectomy    CATARACT REMOVAL WITH IMPLANT Right 3 8 16    COLONOSCOPY  5/27/15    EYE SURGERY Right 2022    PARS PLANA VITRECTOMY, ENDO LASER, GAS FLUID EXCHANGE, 25 G, MAC, RIGHT EYE performed by Adria Ibrahim MD at [facility] OR    HYSTERECTOMY (CERVIX STATUS UNKNOWN)      with uterus suspension.    KNEE SURGERY Right 2024    KNEE INCISION AND DRAINAGE performed by Silverio Donohue MD at [facility] OR    MOHS SURGERY         Precautions:  Fall Risk, MRSA        Assessment of current deficits     [x] Functional mobility  [x]ADLs  [x] Strength               []Cognition     [x] Functional transfers   [x] IADLs         [x]

## 2024-05-15 NOTE — PROGRESS NOTES
Date:  5/14/2024  Patient: Mary Khan  Admission:  5/8/2024  2:36 PM  Admit DX: Bradycardia [R00.1]  Symptomatic bradycardia [R00.1]  Age:  89 y.o., 9/3/1928     LOS: 6 days       SUBJECTIVE:      Pain was seen and examined earlier today  She was sitting up in a chair asymptomatic  Heart rate was in the 50s and 60s        OBJECTIVE:    BP (!) 145/67   Pulse 56   Temp 98.2 °F (36.8 °C) (Oral)   Resp 13   Ht 1.575 m (5' 2.01\")   Wt 62.1 kg (136 lb 12.8 oz)   SpO2 95%   BMI 25.01 kg/m²     Intake/Output Summary (Last 24 hours) at 5/14/2024 2036  Last data filed at 5/14/2024 1700  Gross per 24 hour   Intake 240 ml   Output --   Net 240 ml       EXAM:     Head: Normocephalic, Normal hearing, Oral mucosa is moist.  Neck: Supple, No carotid bruit, No jugular venous distention, No lymphadenopathy.  Respiratory: Lungs are clear to auscultation, Respirations are non-labored, Breath sounds are equal, Symmetrical chest wall expansion.  Cardiovascular: Heart rate in the 60s now, No gallop, Good pulses equal in all extremities, No edema.  Gastrointestinal: Soft, Non-tender, Non-distended, Normal bowel sounds.  Musculoskeletal: Normal strength, No tenderness, No deformity.  Integumentary:  Warm, Dry.    Neurologic: Alert, Oriented, No focal deficits.  Cognition and Speech: Oriented, Speech clear and coherent.  Psychiatric: Cooperative, Appropriate mood & affect, Normal judgment.    Current Inpatient Medications:   heparin (porcine)  5,000 Units SubCUTAneous 3 times per day    gabapentin  100 mg Oral Nightly    pantoprazole  40 mg Oral QAM AC    [Held by provider] apixaban  2.5 mg Oral BID    polyethylene glycol  17 g Oral Daily    docusate sodium  100 mg Oral BID    sodium chloride flush  5-40 mL IntraVENous 2 times per day    [Held by provider] bumetanide  1 mg Oral Daily       IV Infusions (if any):   sodium chloride           Labs:   CBC:   Recent Labs     05/13/24  0510 05/14/24  0456   WBC 3.3* 2.8*   HGB  9.3* 9.2*   HCT 29.1* 27.5*    139     BMP:   Recent Labs     05/13/24  0510 05/14/24  0456    141   K 4.4 4.2   CO2 23 21*   BUN 34* 32*   CREATININE 1.5* 1.7*   LABGLOM 32* 28*   GLUCOSE 75 76     BNP: No results for input(s): \"BNP\" in the last 72 hours.  PT/INR: No results for input(s): \"PROTIME\", \"INR\" in the last 72 hours.  APTT:No results for input(s): \"APTT\" in the last 72 hours.  CARDIAC ENZYMES:No results for input(s): \"CKTOTAL\", \"CKMB\", \"CKMBINDEX\", \"TROPONINI\" in the last 72 hours.  FASTING LIPID PANEL:  Lab Results   Component Value Date/Time    HDL 79 07/27/2021 11:30 AM    TRIG 108 07/27/2021 11:30 AM     LIVER PROFILE:  Recent Labs     05/13/24  0510 05/14/24  0456   AST 21 32*   ALT 12 16       ASSESSMENT:    Medications induced bradycardia  Proxysmal atrial fibrillation  Acute renal insufficiency  on top of chronic renal insufficiency on presentation  Mildly elevated troponin  Arthritis  Hyperlipidemia       PLAN:    Of dopamine.  Okay to transfer to intermediate care unit.  Probable discharge soon        Ron Lopez MD, MD, FACC

## 2024-05-15 NOTE — PROGRESS NOTES
Comprehensive Nutrition Assessment    Type and Reason for Visit:  Reassess    Nutrition Recommendations/Plan:   Continue current diet and ONS Ensure Plus HP daily        Malnutrition Assessment:  Malnutrition Status:  Insufficient data (05/10/24 0934)    Context:  Acute Illness     Findings of the 6 clinical characteristics of malnutrition:  Energy Intake:  Mild decrease in energy intake (Comment)  Weight Loss:  Unable to assess (d/t lack of accurate recent wt hx per EMR - 30lb wt loss since 2019)     Body Fat Loss:  Unable to assess (sleeping, covered to chin in blankets)     Muscle Mass Loss:  Unable to assess (sleeping, covered to chin in blankets)    Fluid Accumulation:  No significant fluid accumulation     Strength:  Not Performed    Nutrition Assessment:    Pt admit w/ low heart rate, noted bradycardia. Heart rate and MOMO improving. PMHx of breast Ca, CKD Stg 3, HTN/HLD, restless leg syndrome. Continue to have good appetite/PO intake % of meals, noted poor intake PTA, tolerating diet and ONS. Will continue current regimen, encourage intake.    Nutrition Related Findings:    abd soft, round, tender, active BSx4, A&Ox3-4, +1/+2 edema, I/O's +5.59L since admit Wound Type: Wound Consult Pending (noted RLL traumatic wound)       Current Nutrition Intake & Therapies:    Average Meal Intake: %, 51-75%  Average Supplements Intake: 51-75%  ADULT DIET; Regular  ADULT ORAL NUTRITION SUPPLEMENT; Lunch; Standard High Calorie/High Protein Oral Supplement    Anthropometric Measures:  Height: 157.5 cm (5' 2.01\")  Ideal Body Weight (IBW): 110 lbs (50 kg)    Admission Body Weight: 61 kg (134 lb 8 oz) (5/8 actual)  Current Body Weight: 61.6 kg (135 lb 12.9 oz) (5/15 bed scale), 119.4 % IBW. Weight Source: Bed Scale  Current BMI (kg/m2): 24.8  Usual Body Weight:  (IVA d/t lack of recent wt hx per EMR, noted    156 lbs 13 oz in 5/2019)  Weight Adjustment For: No Adjustment  BMI Categories: Normal Weight (BMI 22.0

## 2024-05-15 NOTE — CARE COORDINATION
5/15/2024 ICU- potential for transfer out of icu versus discharge to SNF. Pt likely for pacemaker, eventually.   Shahla with [place] following. NO PRECERT, Needs signed DEMETRIA, KALE completed. Watch for discharge versus transfer out of icu. Call to PT/OT for updated therapy notes for discharge placement. Electronically signed by Margret Small RN-BC on 5/15/2024 at 8:42 AM

## 2024-05-15 NOTE — PROGRESS NOTES
Physical Therapy Treatment Note/Plan of Care    Room #:  IC04/IC04-01  Patient Name: Mary Khan  YOB: 1928  MRN: [id number]    Date of Service: 5/15/2024     Tentative placement recommendation: Subacute unless patient meets goals then Home Health Physical Therapy with 24/7 assist/supervision  Equipment recommendation: To be determined      Evaluating Physical Therapist: Ion Mariscal, PT #[id number]      Specific Provider Orders/Date/Referring Provider :   05/09/24 1100    PT eval and treat  Start:  05/09/24 1100,   End:  05/09/24 1100,   ONE TIME,   Standing Count:  1 Occurrences,   R       Comments:  Please only as tolerated  Pedro French MD    Admitting Diagnosis:   Bradycardia [R00.1]  Symptomatic bradycardia [R00.1]      Surgery: none        Patient Active Problem List   Diagnosis    Breast cancer (HCC)    Hypertension    Malignant neoplasm of lower-outer quadrant of left female breast (HCC)    Cellulitis of lower extremity    Hyperlipidemia    Chronic back pain    Non-healing ulcer (HCC)    CKD (chronic kidney disease) stage 4, GFR 15-29 ml/min (HCC)    Chronic diastolic heart failure (HCC)    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 Visual deficits, cues needed for safety and environmental negotiation. Shortness of breath noted, rest breaks needed between each activity. Patient able to increase her standing tolerance from last session. The patient will benefit from continued skilled therapy to increase strength and

## 2024-05-15 NOTE — PLAN OF CARE
Problem: Discharge Planning  Goal: Discharge to home or other facility with appropriate resources  Outcome: Progressing     Problem: Skin/Tissue Integrity  Goal: Absence of new skin breakdown  Description: 1.  Monitor for areas of redness and/or skin breakdown  2.  Assess vascular access sites hourly  3.  Every 4-6 hours minimum:  Change oxygen saturation probe site  4.  Every 4-6 hours:  If on nasal continuous positive airway pressure, respiratory therapy assess nares and determine need for appliance change or resting period.  5/15/2024 1017 by Bridgette Valderrama RN  Outcome: Progressing     Problem: Safety - Adult  Goal: Free from fall injury  5/15/2024 1017 by Bridgette Valderrama RN  Outcome: Progressing     Problem: ABCDS Injury Assessment  Goal: Absence of physical injury  5/15/2024 1017 by Bridgette Valderrama RN  Outcome: Progressing     Problem: Pain  Goal: Verbalizes/displays adequate comfort level or baseline comfort level  5/15/2024 1017 by Bridgette Valedrrama RN  Outcome: Progressing     Problem: Chronic Conditions and Co-morbidities  Goal: Patient's chronic conditions and co-morbidity symptoms are monitored and maintained or improved  5/15/2024 1017 by Bridgette Valderrama RN  Outcome: Progressing     Problem: Nutrition Deficit:  Goal: Optimize nutritional status  5/15/2024 1017 by Bridgette Valderrama RN  Outcome: Progressing

## 2024-05-15 NOTE — PROGRESS NOTES
alert and oriented, moves all extremities.  Patient is currently hemodynamically stabilized while on dopamine drip, heart rate is currently 60 beats a minute normal sinus rhythm.  Lungs are clear to auscultation bilaterally, patient on 2 L nasal cannula saturating 98% on the monitor.  Bowel sounds active x 4 quadrants.  Patient states that she was evaluated by a nurse that visits her home regularly and she was found to have a low heart rate and that she had some lightheadedness which ultimately led her to seek aid at [facility].  Patient's son is also at the bedside and able to answer questions, he states that she has macular degeneration in the right eye with some retina tearing as well as glaucoma in her left eye.  The son also explains that she began having drainage in her eye over a week ago.  Patient denies any nausea, vomiting, fevers, chills, chest pain, shortness of breath, numbness, tingling in extremities, or change in bowel habits    Daily progress:  May 11, 2024: Patient continues to have acute kidney injury and continues to be bradycardic.  She has been weaned off dopamine however her heart rate remains low.  Dopamine was reinitiated today.  Antibiotics were discontinued as there is no clear evidence of infection.  She has no fever, chills, rigors.  Patient remains weak however she has been able to ambulate with assistant.  She continues to be off anticoagulation but currently on DVT prophylaxis with heparin subcu.    May 12, 2024: Patient kidney function continues to improve while on low-dose dopamine.  She has no fever, chills, or rigors.  She continues to have red eyes.  Patient has been dyspneic with minimal exertion however she has been off supplemental oxygen.  She remains off full anticoagulation and currently on heparin subcu for DVT prophylaxis.  She is making good progress with dopamine infusion.    May 13, 2024: Patient's kidney function has plateaued around 1.5.  She has faint  [Held by provider] apixaban (ELIQUIS) tablet 2.5 mg, 2.5 mg, Oral, BID, Pedro French MD, 2.5 mg at 05/13/24 1056    polyethylene glycol (GLYCOLAX) packet 17 g, 17 g, Oral, Daily, Marlon Mathew APRN - CNP, 17 g at 05/11/24 1827    docusate sodium (COLACE) capsule 100 mg, 100 mg, Oral, BID, Marlon Mathew APRN - CNP, 100 mg at 05/12/24 0913    sodium chloride flush 0.9 % injection 5-40 mL, 5-40 mL, IntraVENous, 2 times per day, Fredrick Sevilla MD, 10 mL at 05/14/24 2151    sodium chloride flush 0.9 % injection 5-40 mL, 5-40 mL, IntraVENous, PRN, Fredrick Sevilla MD    0.9 % sodium chloride infusion, , IntraVENous, PRN, Fredrick Sevilla MD    ondansetron (ZOFRAN-ODT) disintegrating tablet 4 mg, 4 mg, Oral, Q8H PRN, 4 mg at 05/12/24 1638 **OR** ondansetron (ZOFRAN) injection 4 mg, 4 mg, IntraVENous, Q6H PRN, Fredrick Sevilla MD, 4 mg at 05/08/24 2228    polyethylene glycol (GLYCOLAX) packet 17 g, 17 g, Oral, Daily PRN, Fredrick Sevilla MD, 17 g at 05/10/24 2229    acetaminophen (TYLENOL) tablet 650 mg, 650 mg, Oral, Q6H PRN, 650 mg at 05/12/24 1105 **OR** acetaminophen (TYLENOL) suppository 650 mg, 650 mg, Rectal, Q6H PRN, Fredrick Sevilla MD    [Held by provider] bumetanide (BUMEX) tablet 1 mg, 1 mg, Oral, Daily, Fredrick Sevilla MD    Review of Systems:   General: denies weight gain, denies loss of appetite, fever, chills, night sweats.  HEENT: denies headaches, dizziness, head trauma, visual changes, eye pain, tinnitus, nosebleeds, hoarseness or throat pain    Respiratory: denies chest pain, dyspnea, cough and hemoptysis  Cardiovascular: denies orthopnea, paroxysmal nocturnal dyspnea, leg swelling, and previous heart attack.    Gastrointestinal: denies pain, nausea vomiting, diarrhea, constipation, melena or bleeding.  Genitourinary: denies hematuria, frequency, urgency or dysuria  Neurology: denies syncope, seizures, paralysis, paraesthesia   Endocrine: denies polyuria, polydipsia, skin or hair changes, and heat or

## 2024-05-16 VITALS
HEART RATE: 55 BPM | RESPIRATION RATE: 16 BRPM | TEMPERATURE: 98 F | HEIGHT: 62 IN | DIASTOLIC BLOOD PRESSURE: 76 MMHG | SYSTOLIC BLOOD PRESSURE: 137 MMHG | WEIGHT: 134.92 LBS | BODY MASS INDEX: 24.83 KG/M2 | OXYGEN SATURATION: 94 %

## 2024-05-16 PROCEDURE — 6370000000 HC RX 637 (ALT 250 FOR IP): Performed by: INTERNAL MEDICINE

## 2024-05-16 PROCEDURE — 2580000003 HC RX 258: Performed by: INTERNAL MEDICINE

## 2024-05-16 PROCEDURE — 99239 HOSP IP/OBS DSCHRG MGMT >30: CPT | Performed by: INTERNAL MEDICINE

## 2024-05-16 RX ORDER — PANTOPRAZOLE SODIUM 40 MG/1
40 TABLET, DELAYED RELEASE ORAL
Qty: 30 TABLET | Refills: 3 | DISCHARGE
Start: 2024-05-16

## 2024-05-16 RX ORDER — CALCIUM CARBONATE 500 MG/1
500 TABLET, CHEWABLE ORAL 3 TIMES DAILY PRN
DISCHARGE
Start: 2024-05-16 | End: 2024-06-15

## 2024-05-16 RX ORDER — GABAPENTIN 100 MG/1
100 CAPSULE ORAL NIGHTLY
Qty: 30 CAPSULE | Refills: 0 | DISCHARGE
Start: 2024-05-16 | End: 2024-06-15

## 2024-05-16 RX ORDER — POLYETHYLENE GLYCOL 3350 17 G/17G
17 POWDER, FOR SOLUTION ORAL DAILY PRN
Qty: 527 G | Refills: 0 | DISCHARGE
Start: 2024-05-16 | End: 2024-06-16

## 2024-05-16 RX ADMIN — APIXABAN 2.5 MG: 2.5 TABLET, FILM COATED ORAL at 09:00

## 2024-05-16 RX ADMIN — Medication 10 ML: at 09:00

## 2024-05-16 RX ADMIN — PANTOPRAZOLE SODIUM 40 MG: 40 TABLET, DELAYED RELEASE ORAL at 06:20

## 2024-05-16 ASSESSMENT — PAIN SCALES - GENERAL: PAINLEVEL_OUTOF10: 0

## 2024-05-16 NOTE — PLAN OF CARE
Problem: Discharge Planning  Goal: Discharge to home or other facility with appropriate resources  5/16/2024 0014 by Ninoska Hernandez, RN  Outcome: Progressing     Problem: Skin/Tissue Integrity  Goal: Absence of new skin breakdown  Description: 1.  Monitor for areas of redness and/or skin breakdown  2.  Assess vascular access sites hourly  3.  Every 4-6 hours minimum:  Change oxygen saturation probe site  4.  Every 4-6 hours:  If on nasal continuous positive airway pressure, respiratory therapy assess nares and determine need for appliance change or resting period.  5/16/2024 0014 by Ninoska Hernandez, RN  Outcome: Progressing     Problem: Safety - Adult  Goal: Free from fall injury  5/16/2024 0014 by Ninoska Hernandez, RN  Outcome: Progressing     Problem: ABCDS Injury Assessment  Goal: Absence of physical injury  5/16/2024 0014 by Ninoska Hernandez, RN  Outcome: Progressing

## 2024-05-16 NOTE — CARE COORDINATION
5/16/2024 DISCHARGED: Shahla with [facility] following. NO PRECERT, pcp signed DEMETRIA, HENS completed. Discharge arranged for 11am  per pcp request. [facility] wheelchair transport per Saskia [phone]. Georgi the legal nok and pt are aware they will be responsible for the costs of transportation to the SNF, they agree to pay as previously. WC form in soft chart. Electronically signed by CARMEN Adkins on 5/16/2024 at 9:06 AM

## 2024-05-16 NOTE — DISCHARGE SUMMARY
[facility] Hospitalist       Hospitalist Physician Discharge Summary       [facility]  [address]  [place] [address] [phone]  Go on 5/16/2024  Rehabilitation    Ron Lopez MD  [address] [address]  [phone]    Call  As needed    Sandeep Ewing MD  [address] [address]  [phone]    Schedule an appointment as soon as possible for a visit in 2 week(s)  Hospital follow up      Activity level: As tolerated with assistance     Diet: ADULT DIET; Regular  ADULT ORAL NUTRITION SUPPLEMENT; Lunch; Standard High Calorie/High Protein Oral Supplement    Labs:  BMP and CBC weekly on Mon starting 5/20    Condition at discharge: Stable     Dispo:SNF    Patient ID:  Mary Khan  [id number]  89 y.o.  9/3/1928    Admit date: 5/8/2024    Discharge date and time:  5/16/2024  8:56 AM    Admission Diagnoses: Principal Problem:    Bradycardia  Active Problems:    Bradycardia with 31-40 beats per minute    Paroxysmal A-fib (HCC)    Acute kidney injury superimposed on CKD (HCC)    Chronic diastolic congestive heart failure (HCC)    Symptomatic bradycardia  Resolved Problems:    * No resolved hospital problems. *      Discharge Diagnoses: Principal Problem:    Bradycardia  Active Problems:    Bradycardia with 31-40 beats per minute    Paroxysmal A-fib (HCC)    Acute kidney injury superimposed on CKD (HCC)    Chronic diastolic congestive heart failure (HCC)    Symptomatic bradycardia  Resolved Problems:    * No resolved hospital problems. *      Consults:  IP CONSULT TO CARDIOLOGY  IP CONSULT TO CRITICAL CARE    Procedures: Echo 5/9/2024    HPI:  89-year-old female presents to the emergency department after visiting nurse noticed a very slow heart rate.  The nurses in the house to do a wound care check and dressing change.  Patient had just been discharged from the skilled nursing facility.  It appears that she has been on  5/16/2024 at 8:56 AM

## 2024-05-16 NOTE — PLAN OF CARE
Problem: Discharge Planning  Goal: Discharge to home or other facility with appropriate resources  5/16/2024 1105 by Jeremiah Patterson RN  Outcome: Completed  Flowsheets (Taken 5/16/2024 0400 by Ashwin Lopez RN)  Discharge to home or other facility with appropriate resources: Identify barriers to discharge with patient and caregiver     Problem: Skin/Tissue Integrity  Goal: Absence of new skin breakdown  Description: 1.  Monitor for areas of redness and/or skin breakdown  2.  Assess vascular access sites hourly  3.  Every 4-6 hours minimum:  Change oxygen saturation probe site  4.  Every 4-6 hours:  If on nasal continuous positive airway pressure, respiratory therapy assess nares and determine need for appliance change or resting period.  5/16/2024 1105 by Jeremiah Patterson RN  Outcome: Completed     Problem: Safety - Adult  Goal: Free from fall injury  5/16/2024 1105 by Jeremiah Patterson RN  Outcome: Completed     Problem: ABCDS Injury Assessment  Goal: Absence of physical injury  5/16/2024 1105 by Jeremiah Patterson RN  Outcome: Completed     Problem: Pain  Goal: Verbalizes/displays adequate comfort level or baseline comfort level  Outcome: Completed     Problem: Chronic Conditions and Co-morbidities  Goal: Patient's chronic conditions and co-morbidity symptoms are monitored and maintained or improved  Outcome: Completed  Flowsheets (Taken 5/16/2024 0400 by Ashwin Lopez RN)  Care Plan - Patient's Chronic Conditions and Co-Morbidity Symptoms are Monitored and Maintained or Improved: Monitor and assess patient's chronic conditions and comorbid symptoms for stability, deterioration, or improvement     Problem: Nutrition Deficit:  Goal: Optimize nutritional status  Outcome: Completed     Problem: Skin/Tissue Integrity - Adult  Goal: Skin integrity remains intact  Outcome: Completed  Flowsheets (Taken 5/16/2024 0400 by Ashwin Lopez, RN)  Skin Integrity Remains Intact: Monitor for areas of redness and/or skin breakdown

## 2024-05-16 NOTE — PROGRESS NOTES
Patient stated she is missing a garbage bag full of clothes. Patient has two bags of belongings at bedside. Per ICU there was not a garbage bag of clothes brought in.

## 2024-05-16 NOTE — PROGRESS NOTES
[facility] Hospitalist   ICU Progress Note    Admitting Date and Time: 5/8/2024  2:36 PM  Admit Dx: Bradycardia [R00.1]  Symptomatic bradycardia [R00.1]    Subjective:    5/10: Pt feels tired and somewhat woozy. Complains BP cuff on left wrist is very tight.  Per RN, Dopamine drip was at 15 mcg/kg/min but now down to 7.5 mcg/kg/min.    5/11: Pt resting comfortably in bed. She states she is tired.  Per RN Dopamine drip on intermittently. She was off for several hours last evening and then again early this morning. She is now back at 1 mcg/kg/min. Patient was complaining of constipation and needed to be manually disimpacted.    5/12: Pt was asking for something \"peppermint\" for her stomach. When I asked her to clarify she stated and said Tums.  Per RN, remains on Dopamine 1 mcg/kg/min to help with kidney perfusion per intensivist.    5/13: Pt sitting up in chair. Complains of BP cuff on wrist is too tight.  Per RN, remains on Dopamine 1 mcg/kg/min to help with kidney perfusion per intensivist.    5/14: Pt sitting up in bed. She is in good spirits.  Per RN, Dopamine has been off as per my orders since yesterday evening. HR has been 50's-60's    5/15: Pt sitting up in chair with headphones on. She is happy to learn she is being discharged tomorrow.    Per RN: patient to be transferred to monitored floor this evening.       gabapentin  100 mg Oral Nightly    pantoprazole  40 mg Oral QAM AC    apixaban  2.5 mg Oral BID    sodium chloride flush  5-40 mL IntraVENous 2 times per day    [Held by provider] bumetanide  1 mg Oral Daily     calcium carbonate, 500 mg, TID PRN  sodium chloride flush, 5-40 mL, PRN  sodium chloride, , PRN  ondansetron, 4 mg, Q8H PRN   Or  ondansetron, 4 mg, Q6H PRN  polyethylene glycol, 17 g, Daily PRN  acetaminophen, 650 mg, Q6H PRN   Or  acetaminophen, 650 mg, Q6H PRN         Objective:    /65   Pulse 55   Temp 97.7 °F (36.5 °C) (Oral)   Resp 12   Ht 1.575 m (5' 2.01\")

## 2024-05-16 NOTE — PROGRESS NOTES
Date:  5/15/2024  Patient: Mary Khan  Admission:  5/8/2024  2:36 PM  Admit DX: Bradycardia [R00.1]  Symptomatic bradycardia [R00.1]  Age:  89 y.o., 9/3/1928     LOS: 7 days       SUBJECTIVE:      Patient seen and examined  She is sitting up in a chair  Denied any cardiac symptoms  She has not had any severe bradycardia since she has been off dopamine.  Blood pressure is controlled          OBJECTIVE:    /65   Pulse 74   Temp 97.7 °F (36.5 °C) (Oral)   Resp 22   Ht 1.575 m (5' 2.01\")   Wt 61.6 kg (135 lb 12.8 oz)   SpO2 94%   BMI 24.83 kg/m²   No intake or output data in the 24 hours ending 05/15/24 2219      EXAM:     Head: Normocephalic, Normal hearing, Oral mucosa is moist.  Neck: Supple, No carotid bruit, No jugular venous distention, No lymphadenopathy.  Respiratory: Lungs are clear to auscultation, Respirations are non-labored, Breath sounds are equal, Symmetrical chest wall expansion.  Cardiovascular: Heart rate in the 60s now, No gallop, Good pulses equal in all extremities, No edema.  Gastrointestinal: Soft, Non-tender, Non-distended, Normal bowel sounds.  Musculoskeletal: Normal strength, No tenderness, No deformity.  Integumentary:  Warm, Dry.    Neurologic: Alert, Oriented, No focal deficits.  Cognition and Speech: Oriented, Speech clear and coherent.  Psychiatric: Cooperative, Appropriate mood & affect, Normal judgment.    Current Inpatient Medications:   gabapentin  100 mg Oral Nightly    pantoprazole  40 mg Oral QAM AC    apixaban  2.5 mg Oral BID    sodium chloride flush  5-40 mL IntraVENous 2 times per day    [Held by provider] bumetanide  1 mg Oral Daily       IV Infusions (if any):   sodium chloride           Labs:   CBC:   Recent Labs     05/13/24  0510 05/14/24  0456 05/15/24  0330   WBC 3.3* 2.8* 3.2*   HGB 9.3* 9.2* 10.2*   HCT 29.1* 27.5* 31.0*    139  --      BMP:   Recent Labs     05/14/24  0456 05/15/24  0330    138   K 4.2 4.1   CO2 21* 20*   BUN 32*

## 2024-05-16 NOTE — PLAN OF CARE
Problem: Skin/Tissue Integrity  Goal: Absence of new skin breakdown  Description: 1.  Monitor for areas of redness and/or skin breakdown  2.  Assess vascular access sites hourly  3.  Every 4-6 hours minimum:  Change oxygen saturation probe site  4.  Every 4-6 hours:  If on nasal continuous positive airway pressure, respiratory therapy assess nares and determine need for appliance change or resting period.  5/15/2024 2017 by Jamey Alejo RN  Outcome: Progressing     Problem: Safety - Adult  Goal: Free from fall injury  5/15/2024 2017 by Jamey Alejo RN  Outcome: Progressing     Problem: ABCDS Injury Assessment  Goal: Absence of physical injury  5/15/2024 2017 by Jamey Alejo RN  Outcome: Progressing     Problem: Pain  Goal: Verbalizes/displays adequate comfort level or baseline comfort level  5/15/2024 2017 by Jamey Alejo RN  Outcome: Progressing  Flowsheets (Taken 5/15/2024 1600)  Verbalizes/displays adequate comfort level or baseline comfort level: Encourage patient to monitor pain and request assistance     Problem: Chronic Conditions and Co-morbidities  Goal: Patient's chronic conditions and co-morbidity symptoms are monitored and maintained or improved  5/15/2024 2017 by Jamey Alejo RN  Outcome: Progressing     Problem: Nutrition Deficit:  Goal: Optimize nutritional status  5/15/2024 2017 by Jamey Alejo RN  Outcome: Progressing     Problem: Skin/Tissue Integrity - Adult  Goal: Skin integrity remains intact  Outcome: Progressing     Problem: Musculoskeletal - Adult  Goal: Return mobility to safest level of function  Outcome: Progressing     Problem: Infection - Adult  Goal: Absence of infection during hospitalization  Outcome: Progressing

## 2024-05-26 NOTE — PLAN OF CARE
Problem: Discharge Planning  Goal: Discharge to home or other facility with appropriate resources  5/18/2022 2148 by Judy Dodson RN  Outcome: Progressing     Problem: Safety - Adult  Goal: Free from fall injury  5/18/2022 2148 by Judy Dodson RN  Outcome: Progressing     Problem: ABCDS Injury Assessment  Goal: Absence of physical injury  5/18/2022 2148 by Judy Dodson RN  Outcome: Progressing     Problem: Pain  Goal: Verbalizes/displays adequate comfort level or baseline comfort level  5/18/2022 2148 by Judy Dodson RN  Outcome: Progressing     Problem: Skin/Tissue Integrity  Goal: Absence of new skin breakdown  Description: 1. Monitor for areas of redness and/or skin breakdown  2. Assess vascular access sites hourly  3. Every 4-6 hours minimum:  Change oxygen saturation probe site  4. Every 4-6 hours:  If on nasal continuous positive airway pressure, respiratory therapy assess nares and determine need for appliance change or resting period.   5/18/2022 2148 by Judy Dodson RN  Outcome: Progressing     Problem: Chronic Conditions and Co-morbidities  Goal: Patient's chronic conditions and co-morbidity symptoms are monitored and maintained or improved  5/18/2022 2148 by Judy Dodson RN  Outcome: Progressing No

## 2024-06-04 NOTE — PROGRESS NOTES
Physician Progress Note      PATIENT:               YOUSIF PINZON  CSN #:                  [id number]  :                       9/3/1928  ADMIT DATE:       2024 2:36 PM  DISCH DATE:        2024 11:03 AM  RESPONDING  PROVIDER #:        José Luis Bueno MD          QUERY TEXT:    Pt admitted with bradycardia and has Chronic diastolic congestive heart   failure documented. Patient noted to have elevated ProBNP, cardiomegaly on   chest x-ray with crackles noted on exam, and treated with IV lasix.  After   study, can the acuity of the diastolic heart failure be specified as:      The medical record reflects the following:  Risk Factors: HTN, CKD  Clinical Indicators: Progress note  \"...Acute kidney injury superimposed   on CKD... Chronic diastolic congestive heart failure...Bradycardia   (medication-induced)...\",  Critical Care Note 5/15 \"...Heart failure with   preserved ejection fraction...Echocardiogram with normal EF...May 13, 2023...   She has faint wheezing and crackles on exam today.  Chest x-ray looks mildly   congested.  IV fluid was discontinued and patient was given 1 dose of Lasix   today...+ve crackles\", Echocardiogram  \"...Left Ventricle: Normal left   ventricular systolic function with a visually estimated EF  Treatment: labs, IV Lasix, PO Bumex, Cardiology Consult, Echocardiogram, Chest   x-ray    Thank you,  ZEB Lomeli, RN, [facility]  [phone]  Options provided:  -- Acute on Chronic (decompensated/exacerbated) Diastolic CHF/HFpEF  -- Chronic Diastolic (compensated) CHF/HFpEF  -- Other - I will add my own diagnosis  -- Disagree - Not applicable / Not valid  -- Disagree - Clinically unable to determine / Unknown  -- Refer to Clinical Documentation Reviewer    PROVIDER RESPONSE TEXT:    This patient has chronic diastolic (compensated) CHF/HFpEF.    Query created by: Rene Lipscomb on 2024 12:04 PM      QUERY TEXT:    Pt admitted with bradycardia. Pt noted to

## 2024-08-12 ENCOUNTER — HOSPITAL ENCOUNTER (EMERGENCY)
Age: 89
Discharge: HOME | End: 2024-08-12
Attending: STUDENT IN AN ORGANIZED HEALTH CARE EDUCATION/TRAINING PROGRAM
Payer: MEDICARE

## 2024-08-12 ENCOUNTER — APPOINTMENT (OUTPATIENT)
Dept: CT IMAGING | Age: 89
End: 2024-08-12
Payer: MEDICARE

## 2024-08-12 VITALS
HEART RATE: 68 BPM | BODY MASS INDEX: 21.03 KG/M2 | TEMPERATURE: 98.2 F | OXYGEN SATURATION: 98 % | RESPIRATION RATE: 18 BRPM | WEIGHT: 115 LBS | SYSTOLIC BLOOD PRESSURE: 138 MMHG | DIASTOLIC BLOOD PRESSURE: 60 MMHG

## 2024-08-12 DIAGNOSIS — M48.061 SPINAL STENOSIS OF LUMBAR REGION, UNSPECIFIED WHETHER NEUROGENIC CLAUDICATION PRESENT: ICD-10-CM

## 2024-08-12 DIAGNOSIS — M51.36 LUMBAR DEGENERATIVE DISC DISEASE: Primary | ICD-10-CM

## 2024-08-12 DIAGNOSIS — K59.00 CONSTIPATION, UNSPECIFIED CONSTIPATION TYPE: ICD-10-CM

## 2024-08-12 LAB
ALBUMIN SERPL-MCNC: 4.2 G/DL (ref 3.5–5.2)
ALP SERPL-CCNC: 120 U/L (ref 35–104)
ALT SERPL-CCNC: 16 U/L (ref 0–32)
ANION GAP SERPL CALCULATED.3IONS-SCNC: 12 MMOL/L (ref 7–16)
AST SERPL-CCNC: 24 U/L (ref 0–31)
BACTERIA URNS QL MICRO: ABNORMAL
BASOPHILS # BLD: 0.01 K/UL (ref 0–0.2)
BASOPHILS NFR BLD: 0 % (ref 0–2)
BILIRUB SERPL-MCNC: 0.7 MG/DL (ref 0–1.2)
BILIRUB UR QL STRIP: NEGATIVE
BUN SERPL-MCNC: 30 MG/DL (ref 6–23)
CALCIUM SERPL-MCNC: 9.9 MG/DL (ref 8.6–10.2)
CHLORIDE SERPL-SCNC: 93 MMOL/L (ref 98–107)
CLARITY UR: CLEAR
CO2 SERPL-SCNC: 26 MMOL/L (ref 22–29)
COLOR UR: YELLOW
CREAT SERPL-MCNC: 1.1 MG/DL (ref 0.5–1)
EKG ATRIAL RATE: 70 BPM
EKG Q-T INTERVAL: 400 MS
EKG QRS DURATION: 70 MS
EKG QTC CALCULATION (BAZETT): 434 MS
EKG R AXIS: -25 DEGREES
EKG T AXIS: 34 DEGREES
EKG VENTRICULAR RATE: 71 BPM
EOSINOPHIL # BLD: 0 K/UL (ref 0.05–0.5)
EOSINOPHILS RELATIVE PERCENT: 0 % (ref 0–6)
ERYTHROCYTE [DISTWIDTH] IN BLOOD BY AUTOMATED COUNT: 15.3 % (ref 11.5–15)
GFR, ESTIMATED: 47 ML/MIN/1.73M2
GLUCOSE SERPL-MCNC: 116 MG/DL (ref 74–99)
GLUCOSE UR STRIP-MCNC: NEGATIVE MG/DL
HCT VFR BLD AUTO: 37.2 % (ref 34–48)
HGB BLD-MCNC: 12.5 G/DL (ref 11.5–15.5)
HGB UR QL STRIP.AUTO: NEGATIVE
IMM GRANULOCYTES # BLD AUTO: 0.04 K/UL (ref 0–0.58)
IMM GRANULOCYTES NFR BLD: 1 % (ref 0–5)
KETONES UR STRIP-MCNC: NEGATIVE MG/DL
LACTATE BLDV-SCNC: 1.2 MMOL/L (ref 0.5–2.2)
LEUKOCYTE ESTERASE UR QL STRIP: NEGATIVE
LIPASE SERPL-CCNC: 44 U/L (ref 13–60)
LYMPHOCYTES NFR BLD: 0.63 K/UL (ref 1.5–4)
LYMPHOCYTES RELATIVE PERCENT: 9 % (ref 20–42)
MCH RBC QN AUTO: 30.9 PG (ref 26–35)
MCHC RBC AUTO-ENTMCNC: 33.6 G/DL (ref 32–34.5)
MCV RBC AUTO: 92.1 FL (ref 80–99.9)
MONOCYTES NFR BLD: 0.72 K/UL (ref 0.1–0.95)
MONOCYTES NFR BLD: 10 % (ref 2–12)
NEUTROPHILS NFR BLD: 80 % (ref 43–80)
NEUTS SEG NFR BLD: 5.67 K/UL (ref 1.8–7.3)
NITRITE UR QL STRIP: NEGATIVE
PH UR STRIP: 6.5 [PH] (ref 5–9)
PLATELET # BLD AUTO: 210 K/UL (ref 130–450)
PMV BLD AUTO: 10.1 FL (ref 7–12)
POTASSIUM SERPL-SCNC: 4.1 MMOL/L (ref 3.5–5)
PROT SERPL-MCNC: 7.1 G/DL (ref 6.4–8.3)
PROT UR STRIP-MCNC: NEGATIVE MG/DL
RBC # BLD AUTO: 4.04 M/UL (ref 3.5–5.5)
RBC #/AREA URNS HPF: ABNORMAL /HPF
SODIUM SERPL-SCNC: 131 MMOL/L (ref 132–146)
SP GR UR STRIP: 1.01 (ref 1–1.03)
TROPONIN I SERPL HS-MCNC: 40 NG/L (ref 0–9)
TROPONIN I SERPL HS-MCNC: 43 NG/L (ref 0–9)
UROBILINOGEN UR STRIP-ACNC: 0.2 EU/DL (ref 0–1)
WBC #/AREA URNS HPF: ABNORMAL /HPF
WBC OTHER # BLD: 7.1 K/UL (ref 4.5–11.5)

## 2024-08-12 PROCEDURE — 84484 ASSAY OF TROPONIN QUANT: CPT

## 2024-08-12 PROCEDURE — 81001 URINALYSIS AUTO W/SCOPE: CPT

## 2024-08-12 PROCEDURE — 83605 ASSAY OF LACTIC ACID: CPT

## 2024-08-12 PROCEDURE — 6360000004 HC RX CONTRAST MEDICATION: Performed by: RADIOLOGY

## 2024-08-12 PROCEDURE — 83690 ASSAY OF LIPASE: CPT

## 2024-08-12 PROCEDURE — 93005 ELECTROCARDIOGRAM TRACING: CPT | Performed by: STUDENT IN AN ORGANIZED HEALTH CARE EDUCATION/TRAINING PROGRAM

## 2024-08-12 PROCEDURE — 6370000000 HC RX 637 (ALT 250 FOR IP): Performed by: EMERGENCY MEDICINE

## 2024-08-12 PROCEDURE — 80053 COMPREHEN METABOLIC PANEL: CPT

## 2024-08-12 PROCEDURE — 96374 THER/PROPH/DIAG INJ IV PUSH: CPT

## 2024-08-12 PROCEDURE — 6360000002 HC RX W HCPCS: Performed by: STUDENT IN AN ORGANIZED HEALTH CARE EDUCATION/TRAINING PROGRAM

## 2024-08-12 PROCEDURE — 85025 COMPLETE CBC W/AUTO DIFF WBC: CPT

## 2024-08-12 PROCEDURE — 93010 ELECTROCARDIOGRAM REPORT: CPT | Performed by: INTERNAL MEDICINE

## 2024-08-12 PROCEDURE — 74177 CT ABD & PELVIS W/CONTRAST: CPT

## 2024-08-12 PROCEDURE — 99285 EMERGENCY DEPT VISIT HI MDM: CPT

## 2024-08-12 RX ORDER — ACETAMINOPHEN 325 MG/1
650 TABLET ORAL ONCE
Status: COMPLETED | OUTPATIENT
Start: 2024-08-12 | End: 2024-08-12

## 2024-08-12 RX ORDER — DOCUSATE SODIUM 100 MG/1
100 CAPSULE, LIQUID FILLED ORAL ONCE
Status: COMPLETED | OUTPATIENT
Start: 2024-08-12 | End: 2024-08-12

## 2024-08-12 RX ORDER — MORPHINE SULFATE 4 MG/ML
4 INJECTION, SOLUTION INTRAMUSCULAR; INTRAVENOUS ONCE
Status: COMPLETED | OUTPATIENT
Start: 2024-08-12 | End: 2024-08-12

## 2024-08-12 RX ORDER — DOCUSATE SODIUM 100 MG/1
100 CAPSULE, LIQUID FILLED ORAL 2 TIMES DAILY PRN
Qty: 20 CAPSULE | Refills: 0 | Status: SHIPPED | OUTPATIENT
Start: 2024-08-12 | End: 2024-08-22

## 2024-08-12 RX ORDER — POLYETHYLENE GLYCOL 3350 17 G/17G
17 POWDER, FOR SOLUTION ORAL DAILY
Status: DISCONTINUED | OUTPATIENT
Start: 2024-08-12 | End: 2024-08-12 | Stop reason: HOSPADM

## 2024-08-12 RX ADMIN — ACETAMINOPHEN 650 MG: 325 TABLET ORAL at 08:44

## 2024-08-12 RX ADMIN — IOPAMIDOL 75 ML: 755 INJECTION, SOLUTION INTRAVENOUS at 06:50

## 2024-08-12 RX ADMIN — DOCUSATE SODIUM 100 MG: 100 CAPSULE, LIQUID FILLED ORAL at 08:44

## 2024-08-12 RX ADMIN — POLYETHYLENE GLYCOL 3350 17 G: 17 POWDER, FOR SOLUTION ORAL at 08:43

## 2024-08-12 RX ADMIN — MORPHINE SULFATE 4 MG: 4 INJECTION, SOLUTION INTRAMUSCULAR; INTRAVENOUS at 04:27

## 2024-08-12 ASSESSMENT — PAIN SCALES - GENERAL: PAINLEVEL_OUTOF10: 4

## 2024-08-12 ASSESSMENT — LIFESTYLE VARIABLES
HOW OFTEN DO YOU HAVE A DRINK CONTAINING ALCOHOL: NEVER
HOW MANY STANDARD DRINKS CONTAINING ALCOHOL DO YOU HAVE ON A TYPICAL DAY: PATIENT DOES NOT DRINK

## 2024-08-12 ASSESSMENT — ENCOUNTER SYMPTOMS
COUGH: 0
DIARRHEA: 0
NAUSEA: 0
ABDOMINAL PAIN: 1
VOMITING: 0
COLOR CHANGE: 0
SHORTNESS OF BREATH: 0
BACK PAIN: 1

## 2024-08-12 ASSESSMENT — PAIN DESCRIPTION - LOCATION: LOCATION: ABDOMEN

## 2024-08-12 NOTE — ED PROVIDER NOTES
8/12/2024 10:03 AM        START taking these medications    Details   docusate sodium (COLACE) 100 MG capsule Take 1 capsule by mouth 2 times daily as needed for Constipation, Disp-20 capsule, R-0Normal      diclofenac sodium (VOLTAREN) 1 % GEL Apply 2 g topically 4 times daily, Topical, 4 TIMES DAILY Starting Mon 8/12/2024, Disp-2 g, R-0, Normal             Diagnosis:  1. Lumbar degenerative disc disease    2. Spinal stenosis of lumbar region, unspecified whether neurogenic claudication present    3. Constipation, unspecified constipation type        Disposition:  Patient's disposition: Discharge to home  Patient's condition is stable.            Silvio Zuleta DO  08/12/24 1070

## 2024-08-24 ENCOUNTER — APPOINTMENT (OUTPATIENT)
Dept: CT IMAGING | Age: 89
DRG: 543 | End: 2024-08-24
Payer: MEDICARE

## 2024-08-24 ENCOUNTER — APPOINTMENT (OUTPATIENT)
Dept: GENERAL RADIOLOGY | Age: 89
DRG: 543 | End: 2024-08-24
Payer: MEDICARE

## 2024-08-24 ENCOUNTER — HOSPITAL ENCOUNTER (INPATIENT)
Age: 89
LOS: 3 days | Discharge: NURSING, REHAB OR LONG-TERM CARE | DRG: 543 | End: 2024-08-27
Attending: STUDENT IN AN ORGANIZED HEALTH CARE EDUCATION/TRAINING PROGRAM | Admitting: INTERNAL MEDICINE
Payer: MEDICARE

## 2024-08-24 ENCOUNTER — APPOINTMENT (OUTPATIENT)
Dept: ULTRASOUND IMAGING | Age: 89
DRG: 543 | End: 2024-08-24
Payer: MEDICARE

## 2024-08-24 DIAGNOSIS — R26.2 DIFFICULTY IN WALKING: ICD-10-CM

## 2024-08-24 DIAGNOSIS — S92.002A CLOSED DISPLACED FRACTURE OF LEFT CALCANEUS, UNSPECIFIED PORTION OF CALCANEUS, INITIAL ENCOUNTER: Primary | ICD-10-CM

## 2024-08-24 PROBLEM — S82.892A CLOSED LEFT ANKLE FRACTURE, INITIAL ENCOUNTER: Status: ACTIVE | Noted: 2024-08-24

## 2024-08-24 LAB
ALBUMIN SERPL-MCNC: 3.6 G/DL (ref 3.5–5.2)
ALP SERPL-CCNC: 142 U/L (ref 35–104)
ALT SERPL-CCNC: 23 U/L (ref 0–32)
ANION GAP SERPL CALCULATED.3IONS-SCNC: 12 MMOL/L (ref 7–16)
AST SERPL-CCNC: 28 U/L (ref 0–31)
BASOPHILS # BLD: 0 K/UL (ref 0–0.2)
BASOPHILS NFR BLD: 0 % (ref 0–2)
BILIRUB SERPL-MCNC: 0.8 MG/DL (ref 0–1.2)
BUN SERPL-MCNC: 39 MG/DL (ref 6–23)
CALCIUM SERPL-MCNC: 9.1 MG/DL (ref 8.6–10.2)
CHLORIDE SERPL-SCNC: 93 MMOL/L (ref 98–107)
CO2 SERPL-SCNC: 25 MMOL/L (ref 22–29)
CREAT SERPL-MCNC: 1.2 MG/DL (ref 0.5–1)
EOSINOPHIL # BLD: 0.01 K/UL (ref 0.05–0.5)
EOSINOPHILS RELATIVE PERCENT: 0 % (ref 0–6)
ERYTHROCYTE [DISTWIDTH] IN BLOOD BY AUTOMATED COUNT: 16.2 % (ref 11.5–15)
GFR, ESTIMATED: 43 ML/MIN/1.73M2
GLUCOSE SERPL-MCNC: 85 MG/DL (ref 74–99)
HCT VFR BLD AUTO: 33 % (ref 34–48)
HGB BLD-MCNC: 11.4 G/DL (ref 11.5–15.5)
IMM GRANULOCYTES # BLD AUTO: 0.04 K/UL (ref 0–0.58)
IMM GRANULOCYTES NFR BLD: 1 % (ref 0–5)
LYMPHOCYTES NFR BLD: 0.56 K/UL (ref 1.5–4)
LYMPHOCYTES RELATIVE PERCENT: 9 % (ref 20–42)
MCH RBC QN AUTO: 32 PG (ref 26–35)
MCHC RBC AUTO-ENTMCNC: 34.5 G/DL (ref 32–34.5)
MCV RBC AUTO: 92.7 FL (ref 80–99.9)
MONOCYTES NFR BLD: 0.59 K/UL (ref 0.1–0.95)
MONOCYTES NFR BLD: 10 % (ref 2–12)
NEUTROPHILS NFR BLD: 80 % (ref 43–80)
NEUTS SEG NFR BLD: 4.91 K/UL (ref 1.8–7.3)
PLATELET # BLD AUTO: 208 K/UL (ref 130–450)
PMV BLD AUTO: 10 FL (ref 7–12)
POTASSIUM SERPL-SCNC: 4.9 MMOL/L (ref 3.5–5)
PROT SERPL-MCNC: 6.2 G/DL (ref 6.4–8.3)
RBC # BLD AUTO: 3.56 M/UL (ref 3.5–5.5)
RBC # BLD: ABNORMAL 10*6/UL
SODIUM SERPL-SCNC: 130 MMOL/L (ref 132–146)
WBC OTHER # BLD: 6.1 K/UL (ref 4.5–11.5)

## 2024-08-24 PROCEDURE — 99285 EMERGENCY DEPT VISIT HI MDM: CPT

## 2024-08-24 PROCEDURE — 73630 X-RAY EXAM OF FOOT: CPT

## 2024-08-24 PROCEDURE — 6370000000 HC RX 637 (ALT 250 FOR IP): Performed by: NURSE PRACTITIONER

## 2024-08-24 PROCEDURE — 99222 1ST HOSP IP/OBS MODERATE 55: CPT | Performed by: INTERNAL MEDICINE

## 2024-08-24 PROCEDURE — 85025 COMPLETE CBC W/AUTO DIFF WBC: CPT

## 2024-08-24 PROCEDURE — 6370000000 HC RX 637 (ALT 250 FOR IP)

## 2024-08-24 PROCEDURE — 80053 COMPREHEN METABOLIC PANEL: CPT

## 2024-08-24 PROCEDURE — 1200000000 HC SEMI PRIVATE

## 2024-08-24 PROCEDURE — 73620 X-RAY EXAM OF FOOT: CPT

## 2024-08-24 PROCEDURE — 73700 CT LOWER EXTREMITY W/O DYE: CPT

## 2024-08-24 PROCEDURE — 73610 X-RAY EXAM OF ANKLE: CPT

## 2024-08-24 PROCEDURE — APPSS45 APP SPLIT SHARED TIME 31-45 MINUTES: Performed by: NURSE PRACTITIONER

## 2024-08-24 PROCEDURE — 93971 EXTREMITY STUDY: CPT

## 2024-08-24 PROCEDURE — 6370000000 HC RX 637 (ALT 250 FOR IP): Performed by: STUDENT IN AN ORGANIZED HEALTH CARE EDUCATION/TRAINING PROGRAM

## 2024-08-24 RX ORDER — AMLODIPINE BESYLATE 5 MG/1
5 TABLET ORAL DAILY
Status: DISCONTINUED | OUTPATIENT
Start: 2024-08-24 | End: 2024-08-27 | Stop reason: HOSPADM

## 2024-08-24 RX ORDER — HYDROCHLOROTHIAZIDE 25 MG/1
25 TABLET ORAL DAILY
Status: ON HOLD | COMMUNITY
End: 2024-08-27 | Stop reason: HOSPADM

## 2024-08-24 RX ORDER — PANTOPRAZOLE SODIUM 40 MG/1
40 TABLET, DELAYED RELEASE ORAL
Status: DISCONTINUED | OUTPATIENT
Start: 2024-08-25 | End: 2024-08-26

## 2024-08-24 RX ORDER — GABAPENTIN 100 MG/1
100 CAPSULE ORAL NIGHTLY
Status: DISCONTINUED | OUTPATIENT
Start: 2024-08-24 | End: 2024-08-27 | Stop reason: HOSPADM

## 2024-08-24 RX ORDER — HYDROCODONE BITARTRATE AND ACETAMINOPHEN 5; 325 MG/1; MG/1
1 TABLET ORAL ONCE
Status: COMPLETED | OUTPATIENT
Start: 2024-08-24 | End: 2024-08-24

## 2024-08-24 RX ORDER — HYDROCODONE BITARTRATE AND ACETAMINOPHEN 5; 325 MG/1; MG/1
1 TABLET ORAL EVERY 6 HOURS PRN
Status: DISCONTINUED | OUTPATIENT
Start: 2024-08-24 | End: 2024-08-27 | Stop reason: HOSPADM

## 2024-08-24 RX ORDER — AMLODIPINE BESYLATE 5 MG/1
5 TABLET ORAL DAILY
COMMUNITY

## 2024-08-24 RX ADMIN — GABAPENTIN 100 MG: 100 CAPSULE ORAL at 21:24

## 2024-08-24 RX ADMIN — HYDROCODONE BITARTRATE AND ACETAMINOPHEN 1 TABLET: 5; 325 TABLET ORAL at 01:25

## 2024-08-24 RX ADMIN — HYDROCODONE BITARTRATE AND ACETAMINOPHEN 1 TABLET: 5; 325 TABLET ORAL at 12:25

## 2024-08-24 RX ADMIN — HYDROCODONE BITARTRATE AND ACETAMINOPHEN 1 TABLET: 5; 325 TABLET ORAL at 04:31

## 2024-08-24 RX ADMIN — HYDROCODONE BITARTRATE AND ACETAMINOPHEN 1 TABLET: 5; 325 TABLET ORAL at 20:09

## 2024-08-24 RX ADMIN — APIXABAN 2.5 MG: 2.5 TABLET, FILM COATED ORAL at 17:16

## 2024-08-24 RX ADMIN — AMLODIPINE BESYLATE 5 MG: 5 TABLET ORAL at 17:16

## 2024-08-24 ASSESSMENT — PAIN DESCRIPTION - DESCRIPTORS
DESCRIPTORS: ACHING;DISCOMFORT
DESCRIPTORS: BURNING
DESCRIPTORS: BURNING;ACHING;SORE

## 2024-08-24 ASSESSMENT — PAIN DESCRIPTION - LOCATION
LOCATION: FOOT
LOCATION: ANKLE

## 2024-08-24 ASSESSMENT — PAIN SCALES - GENERAL
PAINLEVEL_OUTOF10: 8
PAINLEVEL_OUTOF10: 8
PAINLEVEL_OUTOF10: 3
PAINLEVEL_OUTOF10: 8
PAINLEVEL_OUTOF10: 10

## 2024-08-24 ASSESSMENT — PAIN DESCRIPTION - ORIENTATION
ORIENTATION: LEFT

## 2024-08-24 ASSESSMENT — PAIN - FUNCTIONAL ASSESSMENT: PAIN_FUNCTIONAL_ASSESSMENT: PREVENTS OR INTERFERES SOME ACTIVE ACTIVITIES AND ADLS

## 2024-08-24 NOTE — CONSULTS
Department of Orthopedic Surgery  Resident Consult Note    Reason for Consult: Left calcaneus fracture    HISTORY OF PRESENT ILLNESS:       Patient is a 89 y.o. female who presents with left heel pain.  Patient in room with son.  Reports she sustained left heel pain approximately 1 week ago.  Denies falls or trauma.  Reports he is attempted to be ambulatory over the week with increasing pain prompting her ED visit for evaluation.  X-ray and CT were obtained demonstrating left calcaneus fracture for which orthopedic surgery was consulted for.  Denies distal numbness/ting/paresthesias, denies any other orthopedic complaints.    Past Medical History:        Diagnosis Date    Bradycardia 05/08/2024    Breast cancer ([facility])     Cancer ([facility])     melonoma - back; left breast CA    Chronic back pain     CKD (chronic kidney disease) stage 3, GFR 30-59 ml/min ([facility])     Hyperlipidemia     Hypertension     Macular degeneration     MRSA (methicillin resistant Staphylococcus aureus) infection 05/11/2024    right pretibial    Osteoarthritis     Paroxysmal A-fib ([facility]) 05/10/2024    Restless legs syndrome      Past Surgical History:        Procedure Laterality Date    BREAST LUMPECTOMY      BREAST SURGERY Left 6/3/15    lumpectomy    CATARACT REMOVAL WITH IMPLANT Right 3 8 16    COLONOSCOPY  5/27/15    EYE SURGERY Right 1/28/2022    PARS PLANA VITRECTOMY, ENDO LASER, GAS FLUID EXCHANGE, 25 G, MAC, RIGHT EYE performed by Adria Ibrahim MD at [facility] OR    HYSTERECTOMY (CERVIX STATUS UNKNOWN)      with uterus suspension.    KNEE SURGERY Right 1/28/2024    KNEE INCISION AND DRAINAGE performed by Silverio Donohue MD at [facility] OR    MOHS SURGERY       Current Medications:   No current facility-administered medications for this encounter.  Allergies:  Flagyl [metronidazole], Adhesive tape, and Penicillin g    Social History:   TOBACCO:   reports that she has never smoked. She has never used smokeless tobacco.  ETOH:   reports no

## 2024-08-24 NOTE — H&P
[facility] Hospitalist Group   History and Physical      CHIEF COMPLAINT:  left ankle pain    History of Present Illness:  89 y.o. female with a history of Breast cancer, CKD, HLD, HTN, PAF, RLS presents with Left ankle pain and difficulty ambulating. Pt states she has noted ongoing pain to left ankle for about a week, however significantly worsened last night. She denies trauma and fall. States she lives alone in a Ranch style home and uses a walker and a cane to assist with ambulation. She denies any fevers, chills, N/V/D, CP, SOB. Son is at bedside. Pt received Norco 5-325mg x 2 in ED course. Decision to admit pt for further evaluation and treatment.     Informant(s) for H&P:Pt, son, and EMR    REVIEW OF SYSTEMS:  Admits to left foot pain. Denies fevers, chills, cp, sob, n/v, ha, vision/hearing changes, wt changes, hot/cold flashes, other open skin lesions, diarrhea, constipation, dysuria/hematuria unless noted in HPI. Complete ROS performed with the patient and is otherwise negative.      PMH:  Past Medical History:   Diagnosis Date    Bradycardia 05/08/2024    Breast cancer ([facility]CC)     Cancer ([facility])     melonoma - back; left breast CA    Chronic back pain     CKD (chronic kidney disease) stage 3, GFR 30-59 ml/min (HCC)     Hyperlipidemia     Hypertension     Macular degeneration     MRSA (methicillin resistant Staphylococcus aureus) infection 05/11/2024    right pretibial    Osteoarthritis     Paroxysmal A-fib (HCC) 05/10/2024    Restless legs syndrome        Surgical History:  Past Surgical History:   Procedure Laterality Date    BREAST LUMPECTOMY      BREAST SURGERY Left 6/3/15    lumpectomy    CATARACT REMOVAL WITH IMPLANT Right 3 8 16    COLONOSCOPY  5/27/15    EYE SURGERY Right 1/28/2022    PARS PLANA VITRECTOMY, ENDO LASER, GAS FLUID EXCHANGE, 25 G, MAC, RIGHT EYE performed by Adria Ibrahim MD at [facility] OR    HYSTERECTOMY (CERVIX STATUS UNKNOWN)      with uterus suspension. 
Application Tool (Optional): Liquid Nitrogen Sprayer
Number Of Freeze-Thaw Cycles: 2 freeze-thaw cycles
Show Applicator Variable?: Yes
Consent: The patient's consent was obtained including but not limited to risks of crusting, scabbing, blistering, scarring, darker or lighter pigmentary change, recurrence, incomplete removal and infection.
Render Post-Care Instructions In Note?: no
Detail Level: Detailed
Duration Of Freeze Thaw-Cycle (Seconds): 2
Post-Care Instructions: I reviewed with the patient in detail post-care instructions. Patient is to wear sunprotection, and avoid picking at any of the treated lesions. Pt may apply Vaseline to crusted or scabbing areas.

## 2024-08-24 NOTE — ED PROVIDER NOTES
Shared PARISA-ED Attending Visit.  CC: No        [facility]  Department of Emergency Medicine   ED  Encounter Note  Admit Date/RoomTime: 2024 12:57 AM  ED Room: 0314/0314-02    NAME: Mary Khan  : 9/3/1928  MRN: [id number]     Chief Complaint:  Ankle Pain (Left, no known injury, ankle starting a week ago)    History of Present Illness   History provided by the patient and chart review.     Mary Khan is a 89 y.o. old female presenting to the emergency department by ambulance from home, for non-traumatic Left foot and ankle pain which occured 1 week(s) prior to arrival.  The complaint is due to no known cause.  Since onset the symptoms have been gradually worsening with swelling and difficulty bearing weight.she states there has been 3 occasions that she is attempted to stand up from her chair and was unable to put weight on the foot causing her to have to fall back into her chair.  She has had no other falls or injuries related to falls.  She denies any specific injury or trauma to this area. Her pain is aggraveated by certain movements, pressure on or palpation of painful area, or any attempt to bear weight and relieved by nothing.  She denies any head injury, neck pain, chest pain, shortness of breath, palpitations, hemoptysis, abdominal pain, back pain, extremity injury, numbness, weakness, nausea, vomiting, fever, chills, wounds, or rash.    Wells' Score Criteria for DVT: (possible score ?2 to 9)      Active cancer (treatment within last 6 months or palliative) NO (0)     Calf swelling ? 3 cm compared to asymptomatic calf (measured 10 cm             below tibial tuberosity) NO (0)     Swollen unilateral superficial veins (non-varicose, in symptomatic leg) NO (0)     Unilateral pitting edema (in symptomatic leg): NO (0)     Previous documented DVT: NO (0)     Swelling of entire leg NO (0)      Localized tenderness along the deep venous system YES (1)     Paralysis, paresis, or  lower extremity.           ED Course / Medical Decision Making     Medications   amLODIPine (NORVASC) tablet 5 mg (5 mg Oral Given 8/24/24 1716)   gabapentin (NEURONTIN) capsule 100 mg (has no administration in time range)   pantoprazole (PROTONIX) tablet 40 mg (has no administration in time range)   apixaban (ELIQUIS) tablet 2.5 mg (2.5 mg Oral Given 8/24/24 1716)   HYDROcodone-acetaminophen (NORCO) 5-325 MG per tablet 1 tablet (1 tablet Oral Given 8/24/24 1225)   HYDROcodone-acetaminophen (NORCO) 5-325 MG per tablet 1 tablet (1 tablet Oral Given 8/24/24 0125)   HYDROcodone-acetaminophen (NORCO) 5-325 MG per tablet 1 tablet (1 tablet Oral Given 8/24/24 0431)     ED Course as of 08/24/24 1810   Sat Aug 24, 2024   0313 Patient has a mildly displaced fracture of the posterior calcaneus with decreased bone density diffusely.  She has been ambulating for about 1 week on this foot.  Unclear if the patient needs to be nonweightbearing, which would likely require that she be admitted because she likely will not be able to tolerate using crutches, versus potential walking boot.  Will consult orthopedic surgery for further input. [VG]   0408 CT left foot without contrast ordered by Ortho. [VG]   0509 CT left foot without contrast again shows mildly displaced comminuted fracture of the posterior calcaneus. [VG]   0615 Spoke with Ortho resident, discussed case, they would like the patient to be admitted for evaluation and observation. [VG]   0621 Hospitalist [VG]   0715 Spoke with Dr. Gleason, discussed case, patient is accepted for admission.  She request that we draw basic labs including CMP and CBC. [VG]      ED Course User Index  [VG] Aleisha Cisneros DO     Consults:   IP CONSULT TO ORTHOPEDIC SURGERY        MDM:        History provided by the patient and chart review.     Mary Khan is a 89 y.o. old female presenting to the emergency department by ambulance from home, for non-traumatic left foot and ankle

## 2024-08-25 PROBLEM — S92.002A CLOSED DISPLACED FRACTURE OF LEFT CALCANEUS: Status: ACTIVE | Noted: 2024-08-25

## 2024-08-25 PROBLEM — I48.0 PAROXYSMAL ATRIAL FIBRILLATION (HCC): Status: ACTIVE | Noted: 2024-08-25

## 2024-08-25 PROBLEM — R79.89 PRERENAL AZOTEMIA: Status: ACTIVE | Noted: 2024-08-25

## 2024-08-25 PROBLEM — E87.1 HYPONATREMIA: Status: ACTIVE | Noted: 2024-08-25

## 2024-08-25 PROBLEM — I10 ESSENTIAL HYPERTENSION: Status: ACTIVE | Noted: 2024-08-25

## 2024-08-25 LAB
ANION GAP SERPL CALCULATED.3IONS-SCNC: 11 MMOL/L (ref 7–16)
BASOPHILS # BLD: 0 K/UL (ref 0–0.2)
BASOPHILS NFR BLD: 0 % (ref 0–2)
BUN SERPL-MCNC: 40 MG/DL (ref 6–23)
CALCIUM SERPL-MCNC: 8.6 MG/DL (ref 8.6–10.2)
CHLORIDE SERPL-SCNC: 92 MMOL/L (ref 98–107)
CO2 SERPL-SCNC: 26 MMOL/L (ref 22–29)
CREAT SERPL-MCNC: 1.4 MG/DL (ref 0.5–1)
EOSINOPHIL # BLD: 0 K/UL (ref 0.05–0.5)
EOSINOPHILS RELATIVE PERCENT: 0 % (ref 0–6)
ERYTHROCYTE [DISTWIDTH] IN BLOOD BY AUTOMATED COUNT: 16.9 % (ref 11.5–15)
GFR, ESTIMATED: 33 ML/MIN/1.73M2
GLUCOSE SERPL-MCNC: 111 MG/DL (ref 74–99)
HCT VFR BLD AUTO: 30.4 % (ref 34–48)
HGB BLD-MCNC: 9.9 G/DL (ref 11.5–15.5)
LYMPHOCYTES NFR BLD: 0.67 K/UL (ref 1.5–4)
LYMPHOCYTES RELATIVE PERCENT: 13 % (ref 20–42)
MCH RBC QN AUTO: 31.4 PG (ref 26–35)
MCHC RBC AUTO-ENTMCNC: 32.6 G/DL (ref 32–34.5)
MCV RBC AUTO: 96.5 FL (ref 80–99.9)
MONOCYTES NFR BLD: 0.53 K/UL (ref 0.1–0.95)
MONOCYTES NFR BLD: 10 % (ref 2–12)
NEUTROPHILS NFR BLD: 77 % (ref 43–80)
NEUTS SEG NFR BLD: 3.9 K/UL (ref 1.8–7.3)
OSMOLALITY UR: 481 MOSM/KG (ref 300–900)
PLATELET # BLD AUTO: 190 K/UL (ref 130–450)
PMV BLD AUTO: 10.3 FL (ref 7–12)
POTASSIUM SERPL-SCNC: 4.3 MMOL/L (ref 3.5–5)
RBC # BLD AUTO: 3.15 M/UL (ref 3.5–5.5)
RBC # BLD: ABNORMAL 10*6/UL
SODIUM SERPL-SCNC: 129 MMOL/L (ref 132–146)
SODIUM UR-SCNC: <20 MMOL/L
WBC OTHER # BLD: 5.1 K/UL (ref 4.5–11.5)

## 2024-08-25 PROCEDURE — 97161 PT EVAL LOW COMPLEX 20 MIN: CPT | Performed by: PHYSICAL THERAPIST

## 2024-08-25 PROCEDURE — 1200000000 HC SEMI PRIVATE

## 2024-08-25 PROCEDURE — 83935 ASSAY OF URINE OSMOLALITY: CPT

## 2024-08-25 PROCEDURE — 80048 BASIC METABOLIC PNL TOTAL CA: CPT

## 2024-08-25 PROCEDURE — 99232 SBSQ HOSP IP/OBS MODERATE 35: CPT | Performed by: INTERNAL MEDICINE

## 2024-08-25 PROCEDURE — APPSS30 APP SPLIT SHARED TIME 16-30 MINUTES: Performed by: NURSE PRACTITIONER

## 2024-08-25 PROCEDURE — 6370000000 HC RX 637 (ALT 250 FOR IP): Performed by: INTERNAL MEDICINE

## 2024-08-25 PROCEDURE — 84300 ASSAY OF URINE SODIUM: CPT

## 2024-08-25 PROCEDURE — 6370000000 HC RX 637 (ALT 250 FOR IP): Performed by: NURSE PRACTITIONER

## 2024-08-25 PROCEDURE — 36415 COLL VENOUS BLD VENIPUNCTURE: CPT

## 2024-08-25 PROCEDURE — 97530 THERAPEUTIC ACTIVITIES: CPT | Performed by: PHYSICAL THERAPIST

## 2024-08-25 PROCEDURE — 85025 COMPLETE CBC W/AUTO DIFF WBC: CPT

## 2024-08-25 RX ORDER — DOCUSATE SODIUM 100 MG/1
100 CAPSULE, LIQUID FILLED ORAL DAILY PRN
Status: DISCONTINUED | OUTPATIENT
Start: 2024-08-25 | End: 2024-08-26

## 2024-08-25 RX ORDER — HYDROCHLOROTHIAZIDE 25 MG/1
25 TABLET ORAL DAILY
Status: DISCONTINUED | OUTPATIENT
Start: 2024-08-25 | End: 2024-08-27 | Stop reason: HOSPADM

## 2024-08-25 RX ORDER — POLYETHYLENE GLYCOL 3350 17 G/17G
17 POWDER, FOR SOLUTION ORAL DAILY
Status: DISCONTINUED | OUTPATIENT
Start: 2024-08-25 | End: 2024-08-27 | Stop reason: HOSPADM

## 2024-08-25 RX ADMIN — AMLODIPINE BESYLATE 5 MG: 5 TABLET ORAL at 08:41

## 2024-08-25 RX ADMIN — GABAPENTIN 100 MG: 100 CAPSULE ORAL at 22:23

## 2024-08-25 RX ADMIN — APIXABAN 2.5 MG: 2.5 TABLET, FILM COATED ORAL at 22:23

## 2024-08-25 RX ADMIN — PANTOPRAZOLE SODIUM 40 MG: 40 TABLET, DELAYED RELEASE ORAL at 06:15

## 2024-08-25 RX ADMIN — MAGNESIUM HYDROXIDE 30 ML: 400 SUSPENSION ORAL at 16:11

## 2024-08-25 RX ADMIN — HYDROCODONE BITARTRATE AND ACETAMINOPHEN 1 TABLET: 5; 325 TABLET ORAL at 06:14

## 2024-08-25 RX ADMIN — DOCUSATE SODIUM 100 MG: 100 CAPSULE, LIQUID FILLED ORAL at 16:12

## 2024-08-25 RX ADMIN — APIXABAN 2.5 MG: 2.5 TABLET, FILM COATED ORAL at 08:41

## 2024-08-25 RX ADMIN — POLYETHYLENE GLYCOL 3350 17 G: 17 POWDER, FOR SOLUTION ORAL at 12:27

## 2024-08-25 RX ADMIN — HYDROCODONE BITARTRATE AND ACETAMINOPHEN 1 TABLET: 5; 325 TABLET ORAL at 18:10

## 2024-08-25 ASSESSMENT — PAIN SCALES - GENERAL
PAINLEVEL_OUTOF10: 7
PAINLEVEL_OUTOF10: 3
PAINLEVEL_OUTOF10: 5

## 2024-08-25 ASSESSMENT — PAIN DESCRIPTION - DESCRIPTORS
DESCRIPTORS: ACHING;DISCOMFORT;SORE
DESCRIPTORS: TENDER;SORE;DISCOMFORT

## 2024-08-25 ASSESSMENT — PAIN DESCRIPTION - LOCATION
LOCATION: ANKLE
LOCATION: FOOT;ANKLE

## 2024-08-25 ASSESSMENT — PAIN DESCRIPTION - ORIENTATION
ORIENTATION: LEFT
ORIENTATION: LEFT

## 2024-08-25 ASSESSMENT — PAIN - FUNCTIONAL ASSESSMENT: PAIN_FUNCTIONAL_ASSESSMENT: PREVENTS OR INTERFERES SOME ACTIVE ACTIVITIES AND ADLS

## 2024-08-25 NOTE — PLAN OF CARE
Problem: Safety - Adult  Goal: Free from fall injury  8/25/2024 1210 by Anabel Morse, RN  Outcome: Progressing     Problem: Skin/Tissue Integrity  Goal: Absence of new skin breakdown  Description: 1.  Monitor for areas of redness and/or skin breakdown  2.  Assess vascular access sites hourly  3.  Every 4-6 hours minimum:  Change oxygen saturation probe site  4.  Every 4-6 hours:  If on nasal continuous positive airway pressure, respiratory therapy assess nares and determine need for appliance change or resting period.  8/25/2024 1210 by Anabel Morse, RN  Outcome: Progressing     Problem: Pain  Goal: Verbalizes/displays adequate comfort level or baseline comfort level  Outcome: Progressing

## 2024-08-25 NOTE — CARE COORDINATION
8/25/2024 Weekend Follow up: Pcp Dr. NATHEN Groves does NOT agree at this time with observation. Maintain admit as previously written. Will update [facility] and  manager. Electronically signed by Margret Small RN-BC on 8/25/2024 at 2:14 PM

## 2024-08-25 NOTE — PLAN OF CARE
Problem: Discharge Planning  Goal: Discharge to home or other facility with appropriate resources  8/25/2024 0252 by Lois Wilks RN  Outcome: Progressing     Problem: Safety - Adult  Goal: Free from fall injury  8/25/2024 0252 by Lois Wilks RN  Outcome: Progressing     Problem: ABCDS Injury Assessment  Goal: Absence of physical injury  8/25/2024 0252 by Lois Wilks RN  Outcome: Progressing     Problem: Skin/Tissue Integrity  Goal: Absence of new skin breakdown  Description: 1.  Monitor for areas of redness and/or skin breakdown  2.  Assess vascular access sites hourly  3.  Every 4-6 hours minimum:  Change oxygen saturation probe site  4.  Every 4-6 hours:  If on nasal continuous positive airway pressure, respiratory therapy assess nares and determine need for appliance change or resting period.  8/25/2024 0252 by Lios Wilks, RN  Outcome: Progressing

## 2024-08-26 LAB
25(OH)D3 SERPL-MCNC: 30 NG/ML (ref 30–100)
ANION GAP SERPL CALCULATED.3IONS-SCNC: 11 MMOL/L (ref 7–16)
BUN SERPL-MCNC: 42 MG/DL (ref 6–23)
CALCIUM SERPL-MCNC: 9.4 MG/DL (ref 8.6–10.2)
CHLORIDE SERPL-SCNC: 93 MMOL/L (ref 98–107)
CO2 SERPL-SCNC: 26 MMOL/L (ref 22–29)
CREAT SERPL-MCNC: 1.4 MG/DL (ref 0.5–1)
ERYTHROCYTE [DISTWIDTH] IN BLOOD BY AUTOMATED COUNT: 16.2 % (ref 11.5–15)
GFR, ESTIMATED: 36 ML/MIN/1.73M2
GLUCOSE SERPL-MCNC: 122 MG/DL (ref 74–99)
HCT VFR BLD AUTO: 36.3 % (ref 34–48)
HGB BLD-MCNC: 11.7 G/DL (ref 11.5–15.5)
MCH RBC QN AUTO: 31 PG (ref 26–35)
MCHC RBC AUTO-ENTMCNC: 32.2 G/DL (ref 32–34.5)
MCV RBC AUTO: 96.3 FL (ref 80–99.9)
OSMOLALITY SERPL: 287 MOSM/KG (ref 285–310)
PLATELET # BLD AUTO: 208 K/UL (ref 130–450)
PMV BLD AUTO: 10 FL (ref 7–12)
POTASSIUM SERPL-SCNC: 5 MMOL/L (ref 3.5–5)
RBC # BLD AUTO: 3.77 M/UL (ref 3.5–5.5)
SODIUM SERPL-SCNC: 130 MMOL/L (ref 132–146)
WBC OTHER # BLD: 6.3 K/UL (ref 4.5–11.5)

## 2024-08-26 PROCEDURE — 36415 COLL VENOUS BLD VENIPUNCTURE: CPT

## 2024-08-26 PROCEDURE — APPSS30 APP SPLIT SHARED TIME 16-30 MINUTES: Performed by: NURSE PRACTITIONER

## 2024-08-26 PROCEDURE — 83930 ASSAY OF BLOOD OSMOLALITY: CPT

## 2024-08-26 PROCEDURE — 85027 COMPLETE CBC AUTOMATED: CPT

## 2024-08-26 PROCEDURE — 1200000000 HC SEMI PRIVATE

## 2024-08-26 PROCEDURE — 97110 THERAPEUTIC EXERCISES: CPT

## 2024-08-26 PROCEDURE — 97165 OT EVAL LOW COMPLEX 30 MIN: CPT

## 2024-08-26 PROCEDURE — 99231 SBSQ HOSP IP/OBS SF/LOW 25: CPT | Performed by: INTERNAL MEDICINE

## 2024-08-26 PROCEDURE — 97530 THERAPEUTIC ACTIVITIES: CPT

## 2024-08-26 PROCEDURE — 6370000000 HC RX 637 (ALT 250 FOR IP): Performed by: INTERNAL MEDICINE

## 2024-08-26 PROCEDURE — 80048 BASIC METABOLIC PNL TOTAL CA: CPT

## 2024-08-26 PROCEDURE — 6370000000 HC RX 637 (ALT 250 FOR IP): Performed by: FAMILY MEDICINE

## 2024-08-26 PROCEDURE — 97535 SELF CARE MNGMENT TRAINING: CPT

## 2024-08-26 PROCEDURE — 82306 VITAMIN D 25 HYDROXY: CPT

## 2024-08-26 PROCEDURE — 6370000000 HC RX 637 (ALT 250 FOR IP): Performed by: NURSE PRACTITIONER

## 2024-08-26 RX ORDER — FAMOTIDINE 20 MG/1
20 TABLET, FILM COATED ORAL DAILY
Status: DISCONTINUED | OUTPATIENT
Start: 2024-08-26 | End: 2024-08-27 | Stop reason: HOSPADM

## 2024-08-26 RX ORDER — DOCUSATE SODIUM 100 MG/1
100 CAPSULE, LIQUID FILLED ORAL 2 TIMES DAILY
Status: DISCONTINUED | OUTPATIENT
Start: 2024-08-26 | End: 2024-08-27 | Stop reason: HOSPADM

## 2024-08-26 RX ORDER — BISACODYL 5 MG/1
5 TABLET, DELAYED RELEASE ORAL ONCE
Status: COMPLETED | OUTPATIENT
Start: 2024-08-26 | End: 2024-08-26

## 2024-08-26 RX ORDER — LANOLIN ALCOHOL/MO/W.PET/CERES
1 CREAM (GRAM) TOPICAL DAILY
Status: DISCONTINUED | OUTPATIENT
Start: 2024-08-26 | End: 2024-08-27 | Stop reason: HOSPADM

## 2024-08-26 RX ORDER — SODIUM PHOSPHATE,MONO-DIBASIC 19G-7G/118
1 ENEMA (ML) RECTAL DAILY PRN
Status: DISCONTINUED | OUTPATIENT
Start: 2024-08-26 | End: 2024-08-27 | Stop reason: HOSPADM

## 2024-08-26 RX ORDER — OYSTER SHELL CALCIUM WITH VITAMIN D 500; 200 MG/1; [IU]/1
1 TABLET, FILM COATED ORAL DAILY
Status: DISCONTINUED | OUTPATIENT
Start: 2024-08-26 | End: 2024-08-26 | Stop reason: CLARIF

## 2024-08-26 RX ADMIN — DOCUSATE SODIUM 100 MG: 100 CAPSULE, LIQUID FILLED ORAL at 10:29

## 2024-08-26 RX ADMIN — AMLODIPINE BESYLATE 5 MG: 5 TABLET ORAL at 10:29

## 2024-08-26 RX ADMIN — FAMOTIDINE 20 MG: 20 TABLET, FILM COATED ORAL at 15:40

## 2024-08-26 RX ADMIN — GABAPENTIN 100 MG: 100 CAPSULE ORAL at 20:35

## 2024-08-26 RX ADMIN — MAGNESIUM HYDROXIDE 30 ML: 400 SUSPENSION ORAL at 05:18

## 2024-08-26 RX ADMIN — APIXABAN 2.5 MG: 2.5 TABLET, FILM COATED ORAL at 20:35

## 2024-08-26 RX ADMIN — PANTOPRAZOLE SODIUM 40 MG: 40 TABLET, DELAYED RELEASE ORAL at 05:14

## 2024-08-26 RX ADMIN — HYDROCODONE BITARTRATE AND ACETAMINOPHEN 1 TABLET: 5; 325 TABLET ORAL at 05:14

## 2024-08-26 RX ADMIN — HYDROCODONE BITARTRATE AND ACETAMINOPHEN 1 TABLET: 5; 325 TABLET ORAL at 20:35

## 2024-08-26 RX ADMIN — POLYETHYLENE GLYCOL 3350 17 G: 17 POWDER, FOR SOLUTION ORAL at 10:29

## 2024-08-26 RX ADMIN — Medication 1 TABLET: at 15:40

## 2024-08-26 RX ADMIN — APIXABAN 2.5 MG: 2.5 TABLET, FILM COATED ORAL at 10:29

## 2024-08-26 RX ADMIN — BISACODYL 5 MG: 5 TABLET, COATED ORAL at 05:24

## 2024-08-26 RX ADMIN — SODIUM PHOSPHATE 1 ENEMA: 7; 19 ENEMA RECTAL at 10:32

## 2024-08-26 RX ADMIN — DOCUSATE SODIUM 100 MG: 100 CAPSULE, LIQUID FILLED ORAL at 20:35

## 2024-08-26 ASSESSMENT — PAIN - FUNCTIONAL ASSESSMENT
PAIN_FUNCTIONAL_ASSESSMENT: PREVENTS OR INTERFERES SOME ACTIVE ACTIVITIES AND ADLS
PAIN_FUNCTIONAL_ASSESSMENT: PREVENTS OR INTERFERES WITH MANY ACTIVE NOT PASSIVE ACTIVITIES

## 2024-08-26 ASSESSMENT — PAIN SCALES - GENERAL
PAINLEVEL_OUTOF10: 5
PAINLEVEL_OUTOF10: 4
PAINLEVEL_OUTOF10: 6
PAINLEVEL_OUTOF10: 5

## 2024-08-26 ASSESSMENT — PAIN DESCRIPTION - DESCRIPTORS
DESCRIPTORS: ACHING;DISCOMFORT;SORE
DESCRIPTORS: PRESSURE;SHARP
DESCRIPTORS: TENDER;SORE;DISCOMFORT

## 2024-08-26 ASSESSMENT — PAIN DESCRIPTION - ONSET: ONSET: ON-GOING

## 2024-08-26 ASSESSMENT — PAIN DESCRIPTION - PAIN TYPE: TYPE: ACUTE PAIN

## 2024-08-26 ASSESSMENT — PAIN DESCRIPTION - FREQUENCY: FREQUENCY: CONTINUOUS

## 2024-08-26 ASSESSMENT — PAIN DESCRIPTION - ORIENTATION
ORIENTATION: LEFT

## 2024-08-26 ASSESSMENT — PAIN DESCRIPTION - LOCATION
LOCATION: ANKLE
LOCATION: ANKLE
LOCATION: ABDOMEN;ANKLE

## 2024-08-26 NOTE — CARE COORDINATION
Per Shahla at [facility], patient is out of skilled days and she has not had a 60 day break for them to reset.  For her to go to [facility] she will need to go private pay, she is requesting a private room which is $410 a day and they will require 30 days up front.  Spoke with son, Georgi, he is in agreement with this plan and will contact Shahla to set up payment, will await to hear from Shahla as to when payment secured and DC can take place.  PASSRR done.  Will need DEMETRIA signed at [place].    Addendum:  Per Shahla at [facility], they have spoken with son, Georgi and he is to go to facility to pay tomorrow at 10:30am.  She will notify  when payment received, so patient can DC there.

## 2024-08-26 NOTE — PLAN OF CARE
Problem: Discharge Planning  Goal: Discharge to home or other facility with appropriate resources  8/26/2024 1130 by Josee Fernandez RN  Outcome: Progressing     Problem: Safety - Adult  Goal: Free from fall injury  8/26/2024 1130 by Josee Fernandez, RN  Outcome: Progressing     Problem: ABCDS Injury Assessment  Goal: Absence of physical injury  8/26/2024 1130 by Josee Fernandez, RN  Outcome: Progressing     Problem: Skin/Tissue Integrity  Goal: Absence of new skin breakdown  Description: 1.  Monitor for areas of redness and/or skin breakdown  2.  Assess vascular access sites hourly  3.  Every 4-6 hours minimum:  Change oxygen saturation probe site  4.  Every 4-6 hours:  If on nasal continuous positive airway pressure, respiratory therapy assess nares and determine need for appliance change or resting period.  8/26/2024 1130 by Josee Fernandez, RN  Outcome: Progressing     Problem: Pain  Goal: Verbalizes/displays adequate comfort level or baseline comfort level  8/26/2024 1130 by Josee Fernandez RN  Outcome: Progressing

## 2024-08-26 NOTE — CARE COORDINATION
Case Management Assessment  Initial Evaluation    Date/Time of Evaluation: 8/26/2024 9:48 AM  Assessment Completed by: MI Potter    If patient is discharged prior to next notation, then this note serves as note for discharge by case management.    Patient Name: Mary Khan                   YOB: 1928  Diagnosis: Closed left ankle fracture, initial encounter [S82.892A]  Difficulty in walking [R26.2]  Closed displaced fracture of left calcaneus, unspecified portion of calcaneus, initial encounter [S92.002A]                   Date / Time: 8/24/2024 12:57 AM    Patient Admission Status: Inpatient   Readmission Risk (Low < 19, Mod (19-27), High > 27): Readmission Risk Score: 17.5    Current PCP: Sandeep Ewing MD  PCP verified by CM? Yes    Chart Reviewed: Yes      History Provided by: Patient  Patient Orientation: Alert and Oriented    Patient Cognition: Alert    Hospitalization in the last 30 days (Readmission):  No    If yes, Readmission Assessment in  Navigator will be completed.    Advance Directives:      Code Status: Full Code   Patient's Primary Decision Maker is: Legal Next of Kin    Primary Decision Maker: Georgi Khan - [place] - [phone]    Discharge Planning:    Patient lives with: Alone Type of Home: House  Primary Care Giver: Self  Patient Support Systems include: Children, Family Members   Current Financial resources: Medicare  Current community resources: None  Current services prior to admission: None            Current DME:              Type of Home Care services:  None    ADLS  Prior functional level: Independent in ADLs/IADLs  Current functional level: Assistance with the following:, Mobility    PT AM-PAC: 10 /24  OT AM-PAC:   /24    Family can provide assistance at DC: No  Would you like Case Management to discuss the discharge plan with any other family members/significant others, and if so, who? Yes (son)  Plans to Return to Present Housing:

## 2024-08-26 NOTE — DISCHARGE INSTR - COC
Continuity of Care Form    Patient Name: Mary Khan   :  9/3/1928  MRN:  [id number]    Admit date:  2024  Discharge date:  2024    Code Status Order: Full Code   Advance Directives:   Advance Care Flowsheet Documentation             Admitting Physician:  José Luis Sin MD  PCP: Sandeep Ewing MD    Discharging Nurse: ***  Discharging Hospital Unit/Room#: 0314/0314-02  Discharging Unit Phone Number: [phone]    Emergency Contact:   Extended Emergency Contact Information  Primary Emergency Contact: Georgi Khan  Address: [address]  Home Phone: [phone]  Mobile Phone: [phone]  Relation: Child  Other needs: None  Preferred language: English   needed? No    Past Surgical History:  Past Surgical History:   Procedure Laterality Date    BREAST LUMPECTOMY      BREAST SURGERY Left 6/3/15    lumpectomy    CATARACT REMOVAL WITH IMPLANT Right 3 8 16    COLONOSCOPY  5/27/15    EYE SURGERY Right 2022    PARS PLANA VITRECTOMY, ENDO LASER, GAS FLUID EXCHANGE, 25 G, MAC, RIGHT EYE performed by Adria Ibrahim MD at [facility] OR    HYSTERECTOMY (CERVIX STATUS UNKNOWN)      with uterus suspension.    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

## 2024-08-26 NOTE — PLAN OF CARE
Problem: Discharge Planning  Goal: Discharge to home or other facility with appropriate resources  Outcome: Progressing     Problem: Safety - Adult  Goal: Free from fall injury  8/26/2024 0208 by Lois Wilks RN  Outcome: Progressing     Problem: ABCDS Injury Assessment  Goal: Absence of physical injury  Outcome: Progressing     Problem: Skin/Tissue Integrity  Goal: Absence of new skin breakdown  Description: 1.  Monitor for areas of redness and/or skin breakdown  2.  Assess vascular access sites hourly  3.  Every 4-6 hours minimum:  Change oxygen saturation probe site  4.  Every 4-6 hours:  If on nasal continuous positive airway pressure, respiratory therapy assess nares and determine need for appliance change or resting period.  8/26/2024 0208 by Lois Wilks, RN  Outcome: Progressing     Problem: Pain  Goal: Verbalizes/displays adequate comfort level or baseline comfort level  8/26/2024 0208 by Lois Wilks, RN  Outcome: Progressing

## 2024-08-26 NOTE — ACP (ADVANCE CARE PLANNING)
Advance Care Planning   Healthcare Decision Maker:    Primary Decision Maker: Georgi Khan - Child - [phone]

## 2024-08-27 VITALS
BODY MASS INDEX: 20.61 KG/M2 | TEMPERATURE: 97.4 F | HEART RATE: 62 BPM | RESPIRATION RATE: 16 BRPM | OXYGEN SATURATION: 97 % | DIASTOLIC BLOOD PRESSURE: 52 MMHG | WEIGHT: 112 LBS | SYSTOLIC BLOOD PRESSURE: 141 MMHG | HEIGHT: 62 IN

## 2024-08-27 DIAGNOSIS — S92.002A CLOSED DISPLACED FRACTURE OF LEFT CALCANEUS, UNSPECIFIED PORTION OF CALCANEUS, INITIAL ENCOUNTER: ICD-10-CM

## 2024-08-27 LAB
ANION GAP SERPL CALCULATED.3IONS-SCNC: 5 MMOL/L (ref 7–16)
BUN SERPL-MCNC: 37 MG/DL (ref 6–23)
CALCIUM SERPL-MCNC: 9 MG/DL (ref 8.6–10.2)
CHLORIDE SERPL-SCNC: 93 MMOL/L (ref 98–107)
CO2 SERPL-SCNC: 28 MMOL/L (ref 22–29)
CREAT SERPL-MCNC: 1.3 MG/DL (ref 0.5–1)
ERYTHROCYTE [DISTWIDTH] IN BLOOD BY AUTOMATED COUNT: 16 % (ref 11.5–15)
GFR, ESTIMATED: 37 ML/MIN/1.73M2
GLUCOSE SERPL-MCNC: 80 MG/DL (ref 74–99)
HCT VFR BLD AUTO: 34.1 % (ref 34–48)
HGB BLD-MCNC: 11.1 G/DL (ref 11.5–15.5)
MCH RBC QN AUTO: 31 PG (ref 26–35)
MCHC RBC AUTO-ENTMCNC: 32.6 G/DL (ref 32–34.5)
MCV RBC AUTO: 95.3 FL (ref 80–99.9)
PLATELET # BLD AUTO: 170 K/UL (ref 130–450)
PMV BLD AUTO: 10.1 FL (ref 7–12)
POTASSIUM SERPL-SCNC: 4.7 MMOL/L (ref 3.5–5)
RBC # BLD AUTO: 3.58 M/UL (ref 3.5–5.5)
SODIUM SERPL-SCNC: 126 MMOL/L (ref 132–146)
WBC OTHER # BLD: 6.1 K/UL (ref 4.5–11.5)

## 2024-08-27 PROCEDURE — 80048 BASIC METABOLIC PNL TOTAL CA: CPT

## 2024-08-27 PROCEDURE — 85027 COMPLETE CBC AUTOMATED: CPT

## 2024-08-27 PROCEDURE — APPSS45 APP SPLIT SHARED TIME 31-45 MINUTES: Performed by: NURSE PRACTITIONER

## 2024-08-27 PROCEDURE — 6370000000 HC RX 637 (ALT 250 FOR IP): Performed by: NURSE PRACTITIONER

## 2024-08-27 PROCEDURE — 36415 COLL VENOUS BLD VENIPUNCTURE: CPT

## 2024-08-27 PROCEDURE — 6370000000 HC RX 637 (ALT 250 FOR IP): Performed by: INTERNAL MEDICINE

## 2024-08-27 PROCEDURE — 6370000000 HC RX 637 (ALT 250 FOR IP): Performed by: FAMILY MEDICINE

## 2024-08-27 RX ORDER — PSEUDOEPHEDRINE HCL 30 MG
100 TABLET ORAL 2 TIMES DAILY
DISCHARGE
Start: 2024-08-27

## 2024-08-27 RX ORDER — LANOLIN ALCOHOL/MO/W.PET/CERES
1 CREAM (GRAM) TOPICAL DAILY
DISCHARGE
Start: 2024-08-27

## 2024-08-27 RX ORDER — HYDROCHLOROTHIAZIDE 25 MG/1
25 TABLET ORAL DAILY
DISCHARGE
Start: 2024-08-27

## 2024-08-27 RX ORDER — FAMOTIDINE 20 MG/1
20 TABLET, FILM COATED ORAL DAILY
DISCHARGE
Start: 2024-08-27

## 2024-08-27 RX ORDER — HYDROCODONE BITARTRATE AND ACETAMINOPHEN 5; 325 MG/1; MG/1
1 TABLET ORAL EVERY 6 HOURS PRN
Status: SHIPPED | DISCHARGE
Start: 2024-08-27 | End: 2024-08-27

## 2024-08-27 RX ORDER — HYDROCODONE BITARTRATE AND ACETAMINOPHEN 5; 325 MG/1; MG/1
1 TABLET ORAL EVERY 6 HOURS PRN
Qty: 12 TABLET | Refills: 0 | Status: SHIPPED | OUTPATIENT
Start: 2024-08-27 | End: 2024-08-30

## 2024-08-27 RX ADMIN — HYDROCODONE BITARTRATE AND ACETAMINOPHEN 1 TABLET: 5; 325 TABLET ORAL at 08:39

## 2024-08-27 RX ADMIN — APIXABAN 2.5 MG: 2.5 TABLET, FILM COATED ORAL at 08:39

## 2024-08-27 RX ADMIN — Medication 1 TABLET: at 08:40

## 2024-08-27 RX ADMIN — AMLODIPINE BESYLATE 5 MG: 5 TABLET ORAL at 08:39

## 2024-08-27 RX ADMIN — DOCUSATE SODIUM 100 MG: 100 CAPSULE, LIQUID FILLED ORAL at 08:39

## 2024-08-27 RX ADMIN — FAMOTIDINE 20 MG: 20 TABLET, FILM COATED ORAL at 08:39

## 2024-08-27 RX ADMIN — POLYETHYLENE GLYCOL 3350 17 G: 17 POWDER, FOR SOLUTION ORAL at 08:39

## 2024-08-27 NOTE — PROGRESS NOTES
[facility] Hospitalist   Progress Note    Admitting Date and Time: 8/24/2024 12:57 AM  Admit Dx: Closed left ankle fracture, initial encounter [S82.892A]  Difficulty in walking [R26.2]  Closed displaced fracture of left calcaneus, unspecified portion of calcaneus, initial encounter [S92.002A]      Subjective:    8/26: Pt seen & examined. Awake, A&O, sitting up in chair at bedside. Pt states her pain is well-controlled, but she complains of constipation. States her last BM was on 8/21.  Stable overnight. No other overnight issues reported.     ROS: Denies fever, chills, cp, sob, n/v, HA unless stated above.     Objective:    BP (!) 135/53   Pulse 63   Temp 97.6 °F (36.4 °C) (Oral)   Resp 18   Ht 1.575 m (5' 2\")   Wt 50.8 kg (112 lb)   SpO2 98%   BMI 20.49 kg/m²     PHYSICAL EXAM  General appearance: No apparent distress, appears stated age and cooperative.  HEENT:  [place]. Conjunctivae/corneas clear.   Neck: Supple. No jugular venous distention.   Respiratory: Clear to auscultation bilaterally, normal respiratory effort  Cardiovascular: Regular rate rhythm, normal S1-S2  Abdomen: Soft, nontender, non-distended. Active bowel sounds  Musculoskeletal: Left lower extremity in soft splint. No clubbing, cyanosis, no bilateral lower extremity edema. Brisk capillary refill.   Skin: No rashes on visible skin  Neurologic: awake, alert& oriented     Assessment & Plan:    Left posterior calcaneus fracture: Ortho consulted and does not plan on surgical intervention as bone is osteoporotic and they doubt it could support any screws. Instead, she was placed in well-padded posterior slab splint in plantar flexion. PT/OT consulted but she is NWB to LLE. Anticipate she will need BETO placement, and she is agreeable to this. SW consulted for discharge planning. Norco prn for pain.     Osteoporosis: Start calcium & Vit D supplementation. Check serum Vit D.    Constipation: Dulcolax 5mg po overnight, fleet enema this 
       [facility] Hospitalist   Progress Note    Admitting Date and Time: 8/24/2024 12:57 AM  Admit Dx: Closed left ankle fracture, initial encounter [S82.892A]  Difficulty in walking [R26.2]  Closed displaced fracture of left calcaneus, unspecified portion of calcaneus, initial encounter [S92.002A]    Subjective:    Pt feels poorly today. States she is constipated would like something to assist with her bowels. She states she had a bit of difficulty with PT, but she cannot put weight on her foot. Something she will have to learn. States MOM works well at home. Will order.     ROS: denies fever, chills, cp, sob, n/v, HA unless stated above.     amLODIPine  5 mg Oral Daily    gabapentin  100 mg Oral Nightly    pantoprazole  40 mg Oral QAM AC    apixaban  2.5 mg Oral BID     HYDROcodone 5 mg - acetaminophen, 1 tablet, Q6H PRN         Objective:    BP (!) 118/56   Pulse 60   Temp 98.8 °F (37.1 °C) (Oral)   Resp 18   Ht 1.575 m (5' 2\")   Wt 50.8 kg (112 lb)   SpO2 92%   BMI 20.49 kg/m²   General Appearance: alert and oriented to person, place and time and in no acute distress  Skin: warm and dry  Head: normocephalic and atraumatic  Eyes: pupils equal, round, and reactive to light, extraocular eye movements intact, conjunctivae normal  Neck: neck supple and non tender without mass   Pulmonary/Chest: Diminished to auscultation bilaterally- no wheezes, rales or rhonchi, normal air movement, no respiratory distress  Cardiovascular: normal rate, normal S1 and S2 and no RGM  Abdomen: soft, non-tender, non-distended, normal bowel sounds  Extremities: no cyanosis, no clubbing and Left leg splint wrap.   Neurologic: no cranial nerve deficit and speech normal  Recent Labs     08/24/24  1131   *   K 4.9   CL 93*   CO2 25   BUN 39*   CREATININE 1.2*   GLUCOSE 85   CALCIUM 9.1       Recent Labs     08/24/24  1131   ALKPHOS 142*   BILITOT 0.8   AST 28   ALT 23       Recent Labs     08/24/24  0921   WBC 6.1 
  Physician Progress Note      PATIENT:               YOUSIF PINZON  CSN #:                  [id number]  :                       9/3/1928  ADMIT DATE:       2024 12:57 AM  DISCH DATE:  RESPONDING  PROVIDER #:        Fredrick Sevilla MD          QUERY TEXT:    Pt admitted with left calcaneus fracture. Pt noted to have osteoporosis per   H&P, PN's and DC summary.. If possible, please document in progress notes and   discharge summary if you are evaluating and/or treating any of the following:    The medical record reflects the following:  Risk Factors: elderly, osteoporosis  Clinical Indicators: H&P and PN's \"Left posterior calcaneus fracture...Ortho   consulted and does not plan on surgical intervention as bone is osteoporotic   and they doubt it could support any screws.\"  DC summary \"Ortho was consulted, but noted that bone is osteoporotic and   likely could not support any screws. Instead, she was placed in well-padded   posterior slab splint in plantar flexion.\"  Treatment: NWB, well-padded posterior slab splint in plantar flexion    Thank you,  Roxana Velasco RN, CCDS  Clinical Documentation   [email or web address]  Options provided:  -- Pathological left calcaneus fracture  -- Osteoporotic left calcaneus Fracture  -- Osteoporotic left calcaneus fracture following fall which would not usually   break a normal, healthy bone  -- Traumatic left calcaneus fracture  -- Other - I will add my own diagnosis  -- Disagree - Not applicable / Not valid  -- Disagree - Clinically unable to determine / Unknown  -- Refer to Clinical Documentation Reviewer    PROVIDER RESPONSE TEXT:    This patient has an osteoporotic left calcaneus fracture following fall which   would not break a normal, healthy bone.    Query created by: Roxana Velasco on 2024 12:29 PM      Electronically signed by:  Fredrick Sevilla MD 2024 1:19 PM          
4 Eyes Skin Assessment     NAME:  Mary Khan  YOB: 1928  MEDICAL RECORD NUMBER:  [id number]    The patient is being assessed for  Admission    I agree that at least one RN has performed a thorough Head to Toe Skin Assessment on the patient. ALL assessment sites listed below have been assessed.      Areas assessed by both nurses:    Head, Face, Ears, Shoulders, Back, Chest, Arms, Elbows, Hands, Sacrum. Buttock, Coccyx, Ischium, Legs. Feet and Heels, and Under Medical Devices         Does the Patient have a Wound? No noted wound(s)       Lior Prevention initiated by RN: Yes  Wound Care Orders initiated by RN: No    Pressure Injury (Stage 3,4, Unstageable, DTI, NWPT, and Complex wounds) if present, place Wound referral order by RN under : No    New Ostomies, if present place, Ostomy referral order under : No     Nurse 1 eSignature: Electronically signed by Trice Albright RN on 8/24/24 at 3:32 PM EDT    **SHARE this note so that the co-signing nurse can place an eSignature**    Nurse 2 eSignature: Electronically signed by Brigid Dowell RN on 8/24/24 at 5:50 PM EDT   
OCCUPATIONAL THERAPY INITIAL EVALUATION    [facility]  [address] [place]. [place]        Date:2024                                                  Patient Name: Mary Khan    MRN: [id number]    : 9/3/1928    Room: [address] [place] [address]      Evaluating OT: Tammie Meza OTR/L; [id number]     Referring Provider and Specific Provider Orders/Date:      24  OT eval and treat  Start:  24 08,   End:  24 08,   ONE TIME,   Standing Count:  1 Occurrences,   R         Dion Guevara, DO      Placement Recommendation: Subacute        Diagnosis:   1. Closed displaced fracture of left calcaneus, unspecified portion of calcaneus, initial encounter    2. Difficulty in walking         Surgery: none      Pertinent Medical History:       Past Medical History:   Diagnosis Date    Bradycardia 2024    Breast cancer (HCC)     Cancer (HCC)     melonoma - back; left breast CA    Chronic back pain     CKD (chronic kidney disease) stage 3, GFR 30-59 ml/min (HCC)     Hyperlipidemia     Hypertension     Macular degeneration     MRSA (methicillin resistant Staphylococcus aureus) infection 2024    right pretibial    Osteoarthritis     Paroxysmal A-fib (HCC) 05/10/2024    Restless legs syndrome          Past Surgical History:   Procedure Laterality Date    BREAST LUMPECTOMY      BREAST SURGERY Left 6/3/15    lumpectomy    CATARACT REMOVAL WITH IMPLANT Right 3 8 16    COLONOSCOPY  5/27/15    EYE SURGERY Right 2022    PARS PLANA VITRECTOMY, ENDO LASER, GAS FLUID EXCHANGE, 25 G, MAC, RIGHT EYE performed by Adria Ibrahim MD at [facility] OR    HYSTERECTOMY (CERVIX STATUS UNKNOWN)      with uterus suspension.    KNEE SURGERY Right 2024    KNEE INCISION AND DRAINAGE performed by Silverio Donohue MD at [facility] OR    MOHS SURGERY        Precautions:  Fall Risk, NWB: L LE d/t left tongue type calcaneus fracture, placed in well-padded posterior 
Orthopedic surgery update    Imaging reviewed left foot demonstrating partially displaced incomplete tongue type calcaneus fracture.  Poor bone mineral density.    N.p.o., treatment consent, hold anticoagulation, tentative plan for operative mention today    Formal orthopedic consult note to follow  
Physical Therapy Treatment Note/Plan of Care    Room #:  0314/0314-02  Patient Name: Mary Khan  YOB: 1928  MRN: [id number]    Date of Service: 8/26/2024     Tentative placement recommendation: Subacute Rehab  Equipment recommendation: Equipment at Nursing Home      Evaluating Physical Therapist: Franco Dickey, PT  #[id number]      Specific Provider Orders/Date/Referring Provider :     PT eval and treat  Start:  08/25/24 0800,   End:  08/25/24 0800,   ONE TIME,   Standing Count:  1 Occurrences,   R       Dion Guevara DO    Admitting Diagnosis:   Closed left ankle fracture, initial encounter [S82.892A]  Difficulty in walking [R26.2]  Closed displaced fracture of left calcaneus, unspecified portion of calcaneus, initial encounter [S92.002A]     left calcaneus fracture  Surgery: none  Visit Diagnoses         Codes    Difficulty in walking     R26.2            Patient Active Problem List   Diagnosis    Breast cancer (HCC)    Hypertension    Malignant neoplasm of lower-outer quadrant of left female breast (HCC)    Cellulitis of lower extremity    Hyperlipidemia    Chronic back pain    Non-healing ulcer (HCC)    CKD (chronic kidney disease) stage 4, GFR 15-29 ml/min (HCC)    Chronic diastolic heart failure (HCC)    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)    Hematoma    Ulcer of right leg, with fat layer exposed (HCC)    Lymphedema    Bradycardia with 31-40 beats per minute    Bradycardia    Paroxysmal A-fib (HCC)    Acute kidney injury superimposed on CKD (HCC)    Chronic diastolic congestive heart failure (HCC)    Symptomatic bradycardia    Closed left ankle fracture, initial encounter    Closed displaced fracture of left calcaneus    Prerenal azotemia    Essential hypertension    Paroxysmal atrial fibrillation (HCC)    Hyponatremia        ASSESSMENT of Current Deficits Patient exhibits decreased strength, balance, endurance, coordination, and pain left heel  impairing functional 
gait distance, and tolerance to activity are barriers to d/c and require skilled intervention to address concerns listed above to increase safety and independence at discharge.   Decreased strength, balance and endurance  increases patient's risk for fall.   Pt with difficulty maintaining non weight bearing Left lower extremity d/t weakness in Left lower extremity and bilateral upper extremities  Pt with significant arthritis all over however right knee with crepitus impacting sit to stand      PHYSICAL THERAPY  PLAN OF CARE       Physical therapy plan of care is established based on physician order,  patient diagnosis and clinical assessment    Current Treatment Recommendations:    -Bed Mobility: Lower and upper extremity exercises, and trunk control activities  -Standing Balance: Perform strengthening exercises   to promote motor control with  upper extremity support   -Transfers: Provide instruction on proper hand and foot position for adequate transfer of weight onto right lower extremities and use of gait device if needed and Cues for hand placement, technique and safety. Provide stabilization to prevent fall   -Gait: Gait training and Standing activities to improve: base of support, weight shift, weight bearing  Left lower extremity only  -Endurance: Utilize Supervised activities to increase level of endurance to allow for safe functional mobility including transfers and gait   -Stairs: Stair training with instruction on proper technique and hand placement on rail    PT long term treatment goals are located in below grid    Patient and or family understand(s) diagnosis, prognosis, and plan of care.    Frequency of treatments: Patient will be seen  daily.         Prior Level of Function: Patient ambulated independently, with wheeled walker    Rehab Potential: fair   for baseline    Past medical history:   Past Medical History:   Diagnosis Date    Bradycardia 05/08/2024    Breast cancer (HCC)     Cancer (HCC)

## 2024-08-27 NOTE — PLAN OF CARE
Problem: Discharge Planning  Goal: Discharge to home or other facility with appropriate resources  8/27/2024 0955 by Josee Fernandez RN  Outcome: Progressing     Problem: Safety - Adult  Goal: Free from fall injury  8/27/2024 0955 by Josee Fernandez, RN  Outcome: Progressing     Problem: ABCDS Injury Assessment  Goal: Absence of physical injury  8/27/2024 0955 by Josee Fernandez, RN  Outcome: Progressing     Problem: Skin/Tissue Integrity  Goal: Absence of new skin breakdown  Description: 1.  Monitor for areas of redness and/or skin breakdown  2.  Assess vascular access sites hourly  3.  Every 4-6 hours minimum:  Change oxygen saturation probe site  4.  Every 4-6 hours:  If on nasal continuous positive airway pressure, respiratory therapy assess nares and determine need for appliance change or resting period.  8/27/2024 0955 by Josee Fernandez, RN  Outcome: Progressing     Problem: Pain  Goal: Verbalizes/displays adequate comfort level or baseline comfort level  8/27/2024 0955 by Josee Fernandez RN  Outcome: Progressing

## 2024-08-27 NOTE — DISCHARGE SUMMARY
[facility] Hospitalist Group   Discharge Summary    Discharge date and time:  8/27/2024  9:23 AM    Follow-up with:     Sandeep Ewing MD  [address] [address]  [phone]    Schedule an appointment as soon as possible for a visit in 2 week(s)      Silverio Donohue MD  [address]  [address]  [facility] [address]  [phone]    Schedule an appointment as soon as possible for a visit in 2 week(s)  For wound re-check  Activity level: NWB LLE    Diet: ADULT DIET; Easy to Chew    Labs:Per PCP    Condition at discharge: Stable    Dispo: Discharge to subacute rehab      Patient ID:  Mary Khan 9/3/1928  [id number]    Consults:  IP CONSULT TO ORTHOPEDIC SURGERY  IP CONSULT TO SOCIAL WORK    Procedures: N/A    Hospital Course: Mary Khan is a 89 y.o. female who initially present to ER for left ankle pain and was found to have a fracture of left posterior calcaneous. Ortho was consulted, but noted that bone is osteoporotic and likely could not support any screws. Instead, she was placed in well-padded posterior slab splint in plantar flexion. Pt has NWB restriction to LLE until re-evaluated by ortho as outpatient.  At this time the patient is without objective evidence of an acute process requiring continuing hospitalization or inpatient management.  She has remained hemodynamically stable and is ready for discharge with outpatient follow-up.  I have spoken with the patient and discussed the results of the current hospitalization, in addition to providing specific details for the plan of care and counseling regarding the diagnosis and prognosis.  The plan has been discussed in detail and they are aware of the specific conditions for emergent return, as well as the importance of follow-up.  Their questions are answered at this time and they are agreeable with the plan for discharge to subacute rehab.  Continued appropriate risk factor modification of blood  tablet  docusate 100 MG Caps  famotidine 20 MG tablet  hydroCHLOROthiazide 25 MG tablet         45 minutes or more spent in chart review, examination, evaluation, counseling and review of medications, discussing plan of care with collaborating physician, and documentation.    NOTE: This report was transcribed using voice recognition software. Every effort was made to ensure accuracy; however, inadvertent computerized transcription errors may be present.     Signed:  Electronically signed by AL Kaiser NP on 8/27/2024 at 9:23 AM  [facility]

## 2024-08-27 NOTE — DISCHARGE INSTRUCTIONS
Your information:  Name: Mary Khan  : 9/3/1928    Your instructions:    Discharge to snf.  Follow up with primary care provider and specialists as directed.  Remain NWB LLE    What to do after you leave the hospital:    Recommended diet: regular diet and easy to chew    Recommended activity: NWB LLE        The following personal items were collected during your admission and were returned to you:    Belongings  Dental Appliances: Uppers, Lowers  Vision - Corrective Lenses: Eyeglasses  Hearing Aid: Bilateral hearing aids, Other (Comment) (hearing aide device or amplifier)  Clothing: Jacket/Coat, Shirt  Jewelry: None  Body Piercings Removed: N/A  Electronic Devices: Other (Comment) (magnifiying glass)  Weapons (Notify Protective Services/Security): None  Other Valuables: Other (Comment) (hearing aide amplifier)  Home Medications: None  Valuables Given To: Patient  Provide Name(s) of Who Valuable(s) Were Given To: mary  Responsible person(s) in the waiting room: son went home  Patient approves for provider to speak to responsible person post operatively: Yes    Information obtained by:  By signing below, I understand that if any problems occur once I leave the hospital I am to contact Dr. Ewing.  I understand and acknowledge receipt of the instructions indicated above.

## 2024-08-27 NOTE — CARE COORDINATION
Per Shahla at [facility] they have obtained Payment.  Have arranged transport for 11:30/12 today via [facility].  Have notified sonGeorgi via phone.  PASSRR done, DEMETRIA signed.

## 2024-08-27 NOTE — CARE COORDINATION
Left vm with pts son, briefly reviewed and explained IMM, asked for a return call regarding second copy. Electronically signed by Ladonna Holder CMA

## 2024-09-05 ENCOUNTER — OFFICE VISIT (OUTPATIENT)
Dept: ORTHOPEDIC SURGERY | Age: 89
End: 2024-09-05

## 2024-09-05 VITALS — BODY MASS INDEX: 20.61 KG/M2 | WEIGHT: 112 LBS | HEIGHT: 62 IN

## 2024-09-05 DIAGNOSIS — S92.002A CLOSED DISPLACED FRACTURE OF LEFT CALCANEUS, UNSPECIFIED PORTION OF CALCANEUS, INITIAL ENCOUNTER: Primary | ICD-10-CM

## 2024-09-05 DIAGNOSIS — S92.045A CLOSED NONDISPLACED FRACTURE OF TUBEROSITY OF LEFT CALCANEUS, UNSPECIFIED FRACTURE MORPHOLOGY, INITIAL ENCOUNTER: Primary | ICD-10-CM

## 2024-09-05 ASSESSMENT — ENCOUNTER SYMPTOMS
ABDOMINAL DISTENTION: 0
SHORTNESS OF BREATH: 0
EYE DISCHARGE: 0
ALLERGIC/IMMUNOLOGIC NEGATIVE: 1

## 2024-09-05 NOTE — PROGRESS NOTES
Mary Khan (:  9/3/1928) is a 89 y.o. female,Established patient, here for evaluation of the following chief complaint(s):  Foot Pain (Left calcaneus fx. Presents NWB and in splint )      Assessment & Plan   ASSESSMENT/PLAN:  1. Closed nondisplaced fracture of tuberosity of left calcaneus, unspecified fracture morphology, initial encounter    Skin intact, minimal displacement  Splint placed - max plantarflexion  NWB    Discussed possibility of need for surgery if further displacement or if skin issues    Return in about 2 weeks (around 2024) for xrays out of splint.         Subjective   SUBJECTIVE/OBJECTIVE:  HPI    89-year-old female presents the office today to discuss her left heel.  She was seen in the emergency department about 2 weeks ago.  She had 1 week of increased left heel pain.  Denied any falls or trauma.  She was walking on her foot but noticed increasing pain.  X-rays and CT scan were obtained which demonstrated a nondisplaced left calcaneus avulsion type fracture.  She was placed into a splint with plantarflexion.  She is here today to discuss further treatment.    Past Medical History:   Diagnosis Date    Bradycardia 2024    Breast cancer (HCC)     Cancer (HCC)     melonoma - back; left breast CA    Chronic back pain     CKD (chronic kidney disease) stage 3, GFR 30-59 ml/min (HCC)     Hyperlipidemia     Hypertension     Macular degeneration     MRSA (methicillin resistant Staphylococcus aureus) infection 2024    right pretibial    Osteoarthritis     Paroxysmal A-fib (HCC) 05/10/2024    Restless legs syndrome      Past Surgical History:   Procedure Laterality Date    BREAST LUMPECTOMY      BREAST SURGERY Left 6/3/15    lumpectomy    CATARACT REMOVAL WITH IMPLANT Right 3 8 16    COLONOSCOPY  5/27/15    EYE SURGERY Right 2022    PARS PLANA VITRECTOMY, ENDO LASER, GAS FLUID EXCHANGE, 25 G, MAC, RIGHT EYE performed by Adria Ibrahim MD at [facility]

## 2024-09-18 DIAGNOSIS — S92.045A CLOSED NONDISPLACED FRACTURE OF TUBEROSITY OF LEFT CALCANEUS, UNSPECIFIED FRACTURE MORPHOLOGY, INITIAL ENCOUNTER: Primary | ICD-10-CM

## 2024-09-19 ENCOUNTER — OFFICE VISIT (OUTPATIENT)
Dept: ORTHOPEDIC SURGERY | Age: 89
End: 2024-09-19

## 2024-09-19 VITALS — BODY MASS INDEX: 20.61 KG/M2 | WEIGHT: 112 LBS | HEIGHT: 62 IN | TEMPERATURE: 98.6 F

## 2024-09-19 DIAGNOSIS — S92.045A CLOSED NONDISPLACED FRACTURE OF TUBEROSITY OF LEFT CALCANEUS, UNSPECIFIED FRACTURE MORPHOLOGY, INITIAL ENCOUNTER: Primary | ICD-10-CM

## 2024-09-19 PROCEDURE — 99024 POSTOP FOLLOW-UP VISIT: CPT | Performed by: ORTHOPAEDIC SURGERY

## 2024-09-19 ASSESSMENT — ENCOUNTER SYMPTOMS
SHORTNESS OF BREATH: 0
ALLERGIC/IMMUNOLOGIC NEGATIVE: 1
ABDOMINAL DISTENTION: 0
EYE DISCHARGE: 0

## 2024-10-02 DIAGNOSIS — S92.045A CLOSED NONDISPLACED FRACTURE OF TUBEROSITY OF LEFT CALCANEUS, UNSPECIFIED FRACTURE MORPHOLOGY, INITIAL ENCOUNTER: Primary | ICD-10-CM

## 2024-10-03 ENCOUNTER — OFFICE VISIT (OUTPATIENT)
Dept: ORTHOPEDIC SURGERY | Age: 89
End: 2024-10-03

## 2024-10-03 VITALS — HEIGHT: 62 IN | WEIGHT: 112 LBS | BODY MASS INDEX: 20.61 KG/M2

## 2024-10-03 DIAGNOSIS — S92.045A CLOSED NONDISPLACED FRACTURE OF TUBEROSITY OF LEFT CALCANEUS, UNSPECIFIED FRACTURE MORPHOLOGY, INITIAL ENCOUNTER: Primary | ICD-10-CM

## 2024-10-03 PROCEDURE — 99024 POSTOP FOLLOW-UP VISIT: CPT | Performed by: ORTHOPAEDIC SURGERY

## 2024-10-03 ASSESSMENT — ENCOUNTER SYMPTOMS
SHORTNESS OF BREATH: 0
ALLERGIC/IMMUNOLOGIC NEGATIVE: 1
ABDOMINAL DISTENTION: 0
EYE DISCHARGE: 0

## 2024-10-03 NOTE — PROGRESS NOTES
Mary Khan (:  9/3/1928) is a 89 y.o. female,Established patient, here for evaluation of the following chief complaint(s):  Follow-up (Left calcaneus fx. Presents in splint and denies pain. Repeat xrays taken today. )      Assessment & Plan   ASSESSMENT/PLAN:  1. Closed nondisplaced fracture of tuberosity of left calcaneus, unspecified fracture morphology, initial encounter    Skin intact, minimal displacement  Ok to begin WBAT  Boot provided with heel lift    Discussed possibility of need for surgery if further displacement or if skin issues    Return in about 6 weeks (around 2024).         Subjective   SUBJECTIVE/OBJECTIVE:  HPI    89-year-old female presents the office today to discuss her left heel.  X-rays and CT scan were obtained previously which demonstrated a nondisplaced left calcaneus avulsion type fracture.  She is about 6 weeks from injury She been in a splint with plantarflexion.  She is here today to discuss further treatment.    Past Medical History:   Diagnosis Date    Bradycardia 2024    Breast cancer ([facility])     Cancer ([facility])     melonoma - back; left breast CA    Chronic back pain     CKD (chronic kidney disease) stage 3, GFR 30-59 ml/min ([facility])     Hyperlipidemia     Hypertension     Macular degeneration     MRSA (methicillin resistant Staphylococcus aureus) infection 2024    right pretibial    Osteoarthritis     Paroxysmal A-fib ([facility]) 05/10/2024    Restless legs syndrome      Past Surgical History:   Procedure Laterality Date    BREAST LUMPECTOMY      BREAST SURGERY Left 6/3/15    lumpectomy    CATARACT REMOVAL WITH IMPLANT Right 3 8 16    COLONOSCOPY  5/27/15    EYE SURGERY Right 2022    PARS PLANA VITRECTOMY, ENDO LASER, GAS FLUID EXCHANGE, 25 G, MAC, RIGHT EYE performed by Adria Ibrahim MD at [facility] OR    HYSTERECTOMY (CERVIX STATUS UNKNOWN)      with uterus suspension.    KNEE SURGERY Right 2024    KNEE INCISION AND DRAINAGE performed by

## 2024-11-13 DIAGNOSIS — S92.045A CLOSED NONDISPLACED FRACTURE OF TUBEROSITY OF LEFT CALCANEUS, UNSPECIFIED FRACTURE MORPHOLOGY, INITIAL ENCOUNTER: Primary | ICD-10-CM

## 2024-11-14 ENCOUNTER — OFFICE VISIT (OUTPATIENT)
Dept: ORTHOPEDIC SURGERY | Age: 89
End: 2024-11-14

## 2024-11-14 VITALS — WEIGHT: 112 LBS | BODY MASS INDEX: 20.61 KG/M2 | HEIGHT: 62 IN

## 2024-11-14 DIAGNOSIS — S92.045A CLOSED NONDISPLACED FRACTURE OF TUBEROSITY OF LEFT CALCANEUS, UNSPECIFIED FRACTURE MORPHOLOGY, INITIAL ENCOUNTER: Primary | ICD-10-CM

## 2024-11-14 PROCEDURE — 99024 POSTOP FOLLOW-UP VISIT: CPT | Performed by: ORTHOPAEDIC SURGERY

## 2024-11-14 ASSESSMENT — ENCOUNTER SYMPTOMS
ABDOMINAL DISTENTION: 0
EYE DISCHARGE: 0
ALLERGIC/IMMUNOLOGIC NEGATIVE: 1
SHORTNESS OF BREATH: 0

## 2024-11-14 NOTE — PROGRESS NOTES
Mary Khan (:  9/3/1928) is a 89 y.o. female,Established patient, here for evaluation of the following chief complaint(s):  Follow-up (Left calcaneus fx. Presents NWB in CAM boot. Repeat Xr taken today. )      Assessment & Plan   ASSESSMENT/PLAN:  1. Closed nondisplaced fracture of tuberosity of left calcaneus, unspecified fracture morphology, initial encounter      Skin intact, minimal displacement  WBAT  Wean out of boot      Return if symptoms worsen or fail to improve.         Subjective   SUBJECTIVE/OBJECTIVE:  HPI    89-year-old female presents the office today to discuss her left heel.  X-rays and CT scan were obtained previously which demonstrated a nondisplaced left calcaneus avulsion type fracture.  She is about 12 weeks from injury.  She has been WBAT in boot.  She is here today to discuss further treatment.    Past Medical History:   Diagnosis Date    Bradycardia 2024    Breast cancer (HCC)     Cancer ([facility])     melonoma - back; left breast CA    Chronic back pain     CKD (chronic kidney disease) stage 3, GFR 30-59 ml/min ([facility])     Hyperlipidemia     Hypertension     Macular degeneration     MRSA (methicillin resistant Staphylococcus aureus) infection 2024    right pretibial    Osteoarthritis     Paroxysmal A-fib ([facility]) 05/10/2024    Restless legs syndrome      Past Surgical History:   Procedure Laterality Date    BREAST LUMPECTOMY      BREAST SURGERY Left 6/3/15    lumpectomy    CATARACT REMOVAL WITH IMPLANT Right 3 8 16    COLONOSCOPY  5/27/15    EYE SURGERY Right 2022    PARS PLANA VITRECTOMY, ENDO LASER, GAS FLUID EXCHANGE, 25 G, MAC, RIGHT EYE performed by Adria Ibrahim MD at [facility] OR    HYSTERECTOMY (CERVIX STATUS UNKNOWN)      with uterus suspension.    KNEE SURGERY Right 2024    KNEE INCISION AND DRAINAGE performed by Silverio Donohue MD at [facility] OR    MOHS SURGERY        Family History   Problem Relation Age of Onset    Cancer Mother         breast,

## 2025-06-11 ENCOUNTER — LAB REQUISITION (OUTPATIENT)
Dept: LAB | Facility: HOSPITAL | Age: 89
End: 2025-06-11
Payer: COMMERCIAL

## 2025-06-11 DIAGNOSIS — E03.8 OTHER SPECIFIED HYPOTHYROIDISM: ICD-10-CM

## 2025-06-11 LAB — THYROPEROXIDASE AB SERPL-ACNC: <28 IU/ML

## 2025-06-11 PROCEDURE — 86800 THYROGLOBULIN ANTIBODY: CPT

## 2025-06-11 PROCEDURE — 83520 IMMUNOASSAY QUANT NOS NONAB: CPT

## 2025-06-11 PROCEDURE — 86038 ANTINUCLEAR ANTIBODIES: CPT

## 2025-06-11 PROCEDURE — 86376 MICROSOMAL ANTIBODY EACH: CPT

## 2025-06-12 LAB — ANA SER QL HEP2 SUBST: NEGATIVE

## 2025-06-13 LAB — THYROGLOB AB SERPL-ACNC: <1.5 IU/ML (ref 0–4)

## 2025-06-14 LAB — TSH RECEP AB SER-ACNC: <1.1 IU/L

## (undated) DEVICE — SYRINGE MED 10ML LUERLOCK TIP W/O SFTY DISP

## (undated) DEVICE — GAUZE,SPONGE,4"X4",16PLY,STRL,LF,10/TRAY: Brand: MEDLINE

## (undated) DEVICE — APPLICATOR MEDICATED 26 CC SOLUTION HI LT ORNG CHLORAPREP

## (undated) DEVICE — 4-PORT MANIFOLD: Brand: NEPTUNE 2

## (undated) DEVICE — TUBING PMP L16FT MAIN DISP FOR AR-6400 AR-6475

## (undated) DEVICE — PACK,EXTREMITY,ORTHOMAX,5/CS: Brand: MEDLINE

## (undated) DEVICE — WIPES SKIN CLOTH READYPREP 9 X 10.5 IN 2% CHLORHEX GLUCONATE CHG PREOP

## (undated) DEVICE — PROBE LSR 25GA DIR FOR VISION

## (undated) DEVICE — TOWEL,OR,DSP,ST,BLUE,STD,6/PK,12PK/CS: Brand: MEDLINE

## (undated) DEVICE — SYRINGE, LUER LOCK, 10ML: Brand: MEDLINE

## (undated) DEVICE — SOLUTION IRRIG 3000ML 0.9% SOD CHL USP UROMATIC PLAS CONT

## (undated) DEVICE — DRESSING HYDROFIBER AQUACEL AG ADVANTAGE 3.5X12 IN

## (undated) DEVICE — BANDAGE COMPR W6INXL5YD SELF ADH COHESIVE CO FLX

## (undated) DEVICE — GAUZE,SPONGE,4"X4",16PLY,XRAY,STRL,LF: Brand: MEDLINE

## (undated) DEVICE — GLOVE ORTHO 8   MSG9480

## (undated) DEVICE — ZIMMER® STERILE DISPOSABLE TOURNIQUET CUFF WITH PROTECTIVE SLEEVE AND PLC, DUAL PORT, SINGLE BLADDER, 34 IN. (86 CM)

## (undated) DEVICE — [AGGRESSIVE PLUS CUTTER, ARTHROSCOPIC SHAVER BLADE,  DO NOT RESTERILIZE,  DO NOT USE IF PACKAGE IS DAMAGED,  KEEP DRY,  KEEP AWAY FROM SUNLIGHT]: Brand: FORMULA

## (undated) DEVICE — GLOVE ORTHO 7   MSG9470

## (undated) DEVICE — GOWN,SIRUS,POLYRNF,RAGLAN,XL,ST,30/CS: Brand: MEDLINE

## (undated) DEVICE — PADDING,UNDERCAST,COTTON, 4"X4YD STERILE: Brand: MEDLINE

## (undated) DEVICE — MARKER,SKIN,WI/RULER AND LABELS: Brand: MEDLINE

## (undated) DEVICE — WETFIELD ERASER 25GA FINE TIP STR

## (undated) DEVICE — 3M™ TRANSPORE™ WHITE SURGICAL TAPE 1534-1, 1 INCH X 10 YARD (2,5CM X 9,1M), 12 ROLLS/CARTON 10 CARTONS/CASE: Brand: 3M™ TRANSPORE™

## (undated) DEVICE — PAD,ABDOMINAL,8"X10",ST,LF: Brand: MEDLINE

## (undated) DEVICE — GLOVE SURG SZ 7 L12IN FNGR THK79MIL GRN LTX FREE

## (undated) DEVICE — GLOVE SURG SZ 8 L12IN FNGR THK94MIL STD WHT LTX FREE

## (undated) DEVICE — NEEDLE SYR 18GA L1.5IN RED PLAS HUB S STL BLNT FILL W/O

## (undated) DEVICE — SHEET,DRAPE,40X58,STERILE: Brand: MEDLINE

## (undated) DEVICE — HYPODERMIC SAFETY NEEDLE: Brand: MAGELLAN

## (undated) DEVICE — BANDAGE COMPR M W6INXL10YD WHT BGE VELC E MTRX HK AND LOOP

## (undated) DEVICE — GLOVE ORANGE PI 7   MSG9070

## (undated) DEVICE — BASIC DOUBLE BASIN 2-LF: Brand: MEDLINE INDUSTRIES, INC.

## (undated) DEVICE — BANDAGE COMPR W6INXL12FT SMOOTH FOR LIMB EXSANG ESMARCH

## (undated) DEVICE — ISPAN C3F8 PERFLUOROPROPANEISPAN* C3F8 IS A FLUORINATED GREENHOUSE GAS COVERED BY THE KYOTO PROTOCOL AND HAS A GLOBAL WARMING POTENTIAL (GWP) OF 8,600. RESIDUAL ISPAN* C3F8 GAS SHOULD BE RECOVERED BY APPROPRIATELY QUALIFIED PERSONNEL AND RECYCLED, RECLAIMED OR DESTROYED IN ACCORDANCE WITH LOCAL ORDINANCES.: Brand: ISPAN

## (undated) DEVICE — VITRECTOMY PACK 25 GA INPUT

## (undated) DEVICE — BLADE,STAINLESS-STEEL,11,STRL,DISPOSABLE: Brand: MEDLINE

## (undated) DEVICE — STANDARD HYPODERMIC NEEDLE,POLYPROPYLENE HUB: Brand: MONOJECT

## (undated) DEVICE — PACK PROCEDURE SURG RETINAL ST ES CUST

## (undated) DEVICE — NEEDLE SPNL L3.5IN PNK HUB S STL REG WALL FIT STYL W/ QNCKE

## (undated) DEVICE — TUBING, SUCTION, 1/4" X 10', STRAIGHT: Brand: MEDLINE

## (undated) DEVICE — NDL CNTR 40CT FM MAG: Brand: MEDLINE INDUSTRIES, INC.

## (undated) DEVICE — NEEDLE HYPO 25GA L1.5IN BLU POLYPR HUB S STL REG BVL STR

## (undated) DEVICE — COVER,LIGHT HANDLE,FLX,1/PK: Brand: MEDLINE INDUSTRIES, INC.

## (undated) DEVICE — STOPCOCK IV PRIMING 0.26ML 3 W W/ TWO FEM LUERLOK PRT 1

## (undated) DEVICE — 1060 S-DRAPE SPCL INCISE 10/BX 4BX/CS: Brand: STERI-DRAPE™

## (undated) DEVICE — 3M™ STERI-DRAPE™ U-DRAPE 1015: Brand: STERI-DRAPE™